# Patient Record
Sex: FEMALE | Race: WHITE | NOT HISPANIC OR LATINO | Employment: FULL TIME | URBAN - NONMETROPOLITAN AREA
[De-identification: names, ages, dates, MRNs, and addresses within clinical notes are randomized per-mention and may not be internally consistent; named-entity substitution may affect disease eponyms.]

---

## 2017-01-17 ENCOUNTER — OFFICE VISIT (OUTPATIENT)
Dept: FAMILY MEDICINE CLINIC | Facility: CLINIC | Age: 27
End: 2017-01-17

## 2017-01-17 VITALS
HEIGHT: 62 IN | DIASTOLIC BLOOD PRESSURE: 70 MMHG | SYSTOLIC BLOOD PRESSURE: 110 MMHG | OXYGEN SATURATION: 98 % | BODY MASS INDEX: 31.47 KG/M2 | HEART RATE: 92 BPM | TEMPERATURE: 98.6 F | WEIGHT: 171 LBS

## 2017-01-17 DIAGNOSIS — M54.40 BILATERAL LOW BACK PAIN WITH SCIATICA, SCIATICA LATERALITY UNSPECIFIED, UNSPECIFIED CHRONICITY: ICD-10-CM

## 2017-01-17 DIAGNOSIS — G25.81 RESTLESS LEG SYNDROME: ICD-10-CM

## 2017-01-17 DIAGNOSIS — D50.8 OTHER IRON DEFICIENCY ANEMIA: ICD-10-CM

## 2017-01-17 DIAGNOSIS — L02.214 ABSCESS OF GROIN, LEFT: Primary | ICD-10-CM

## 2017-01-17 DIAGNOSIS — J30.89 SEASONAL ALLERGIC RHINITIS DUE TO OTHER ALLERGIC TRIGGER: ICD-10-CM

## 2017-01-17 PROBLEM — D50.9 IRON DEFICIENCY ANEMIA: Status: ACTIVE | Noted: 2017-01-17

## 2017-01-17 LAB
BASOPHILS # BLD AUTO: 0.04 10*3/MM3 (ref 0–0.3)
BASOPHILS NFR BLD AUTO: 0.4 % (ref 0–2)
EOSINOPHIL # BLD AUTO: 0.5 10*3/MM3 (ref 0–0.7)
EOSINOPHIL NFR BLD AUTO: 5 % (ref 0–5)
ERYTHROCYTE [DISTWIDTH] IN BLOOD BY AUTOMATED COUNT: 13.7 % (ref 11.5–14.5)
HCT VFR BLD AUTO: 42.8 % (ref 37–47)
HGB BLD-MCNC: 13.8 G/DL (ref 12–16)
IMM GRANULOCYTES # BLD: 0.02 10*3/MM3 (ref 0–0.03)
IMM GRANULOCYTES NFR BLD: 0.2 % (ref 0–0.5)
IRON SATN MFR SERPL: 12 % (ref 15–50)
IRON SERPL-MCNC: 45 MCG/DL (ref 49–151)
LYMPHOCYTES # BLD AUTO: 2.86 10*3/MM3 (ref 1–3)
LYMPHOCYTES NFR BLD AUTO: 28.5 % (ref 21–51)
MCH RBC QN AUTO: 29.9 PG (ref 27–33)
MCHC RBC AUTO-ENTMCNC: 32.2 G/DL (ref 33–37)
MCV RBC AUTO: 92.6 FL (ref 80–94)
MONOCYTES # BLD AUTO: 0.75 10*3/MM3 (ref 0.1–0.9)
MONOCYTES NFR BLD AUTO: 7.5 % (ref 0–10)
NEUTROPHILS # BLD AUTO: 5.86 10*3/MM3 (ref 1.4–6.5)
NEUTROPHILS NFR BLD AUTO: 58.4 % (ref 30–70)
PLATELET # BLD AUTO: 367 10*3/MM3 (ref 130–400)
RBC # BLD AUTO: 4.62 10*6/MM3 (ref 4.2–5.4)
TIBC SERPL-MCNC: 368 MCG/DL (ref 241–421)
UIBC SERPL-MCNC: 323 MCG/DL (ref 112–346)
WBC # BLD AUTO: 10.03 10*3/MM3 (ref 4.5–12.5)

## 2017-01-17 PROCEDURE — 36415 COLL VENOUS BLD VENIPUNCTURE: CPT | Performed by: NURSE PRACTITIONER

## 2017-01-17 PROCEDURE — 99214 OFFICE O/P EST MOD 30 MIN: CPT | Performed by: NURSE PRACTITIONER

## 2017-01-17 RX ORDER — GABAPENTIN 300 MG/1
300 CAPSULE ORAL NIGHTLY
Qty: 30 CAPSULE | Refills: 5 | Status: SHIPPED | OUTPATIENT
Start: 2017-01-17 | End: 2017-07-17

## 2017-01-17 RX ORDER — CYCLOBENZAPRINE HCL 5 MG
5 TABLET ORAL 3 TIMES DAILY PRN
Qty: 90 TABLET | Refills: 5 | Status: SHIPPED | OUTPATIENT
Start: 2017-01-17 | End: 2017-07-17 | Stop reason: SDUPTHER

## 2017-01-17 RX ORDER — CETIRIZINE HYDROCHLORIDE 10 MG/1
10 TABLET ORAL DAILY
Qty: 30 TABLET | Refills: 5 | Status: SHIPPED | OUTPATIENT
Start: 2017-01-17 | End: 2017-07-17

## 2017-01-17 NOTE — PROGRESS NOTES
"Subjective   Elisabeth Hall is a 27 y.o. female.     History of Present Illness     Pt is here today for a scheduled routine follow up.   She is complaining of several continued health care problems. Mrs. Hall is concerned that she does not have health insurance and is unable to afford consults.     Anemia  History of iron def anemia. Was seen by hematology in the past. Not currently taking iron. Is very tired.     Low back pain/restless leg   Neurontin is helpful with the low back pain that radiates into her lower extremities. Elisabeth reports that she is unable to take during the day as it makes her very tired. She does take 300 mg at bedtime which helps with pain as well as sleep. She reports that she had an episode a few weeks ago where she moved and felt as well as heard her back \"pop\" loudly. She has been in increased pain with a tingle feeling going down her lower extremities following that episode.       Groin abscess   Pt reports that the abscess in her right groin did not improve with treatment of bactrim and Bactroban . She continues to have tenderness and drainage. At times has new areas arise on her body. Culture of drainage was negative.     Allergic rhinitis   Stable with current medication.     Smoker  Continues to smoke . Does not wish to have cessation education.     Visit Vitals   • /70 (BP Location: Right arm, Patient Position: Sitting, Cuff Size: Adult)   • Pulse 92   • Temp 98.6 °F (37 °C) (Oral)   • Ht 62\" (157.5 cm)   • Wt 171 lb (77.6 kg)   • LMP 01/01/2017   • SpO2 98%   • BMI 31.28 kg/m2       The following portions of the patient's history were reviewed and updated as appropriate: allergies, current medications, past family history, past medical history, past social history, past surgical history and problem list.    Review of Systems   Constitutional: Positive for fatigue. Negative for activity change, appetite change, chills and fever.   HENT: Negative for ear pain, facial " swelling, hearing loss, sinus pressure, sore throat, trouble swallowing and voice change.    Eyes: Negative for pain, discharge and visual disturbance.   Respiratory: Positive for cough. Negative for apnea, chest tightness, shortness of breath and wheezing.    Cardiovascular: Negative for chest pain, palpitations and leg swelling.   Gastrointestinal: Negative for abdominal pain, blood in stool, constipation, diarrhea, nausea and vomiting.   Genitourinary: Negative for dysuria.   Musculoskeletal: Positive for arthralgias, back pain and myalgias. Negative for neck stiffness.   Skin: Positive for wound. Negative for color change.   Neurological: Negative for headaches.   Psychiatric/Behavioral: Negative for confusion and suicidal ideas. The patient is not nervous/anxious.    All other systems reviewed and are negative.      Objective   Physical Exam   Constitutional: She is oriented to person, place, and time. She appears well-developed and well-nourished. No distress.   HENT:   Head: Normocephalic.   Right Ear: External ear normal.   Left Ear: External ear normal.   Nose: Nose normal.   Mouth/Throat: Oropharynx is clear and moist. No oropharyngeal exudate.   Eyes: Pupils are equal, round, and reactive to light.   Neck: Normal range of motion. Neck supple. No tracheal deviation present. No thyromegaly present.   Cardiovascular: Normal rate, regular rhythm and normal heart sounds.  Exam reveals no gallop and no friction rub.    No murmur heard.  Pulmonary/Chest: Effort normal and breath sounds normal. No respiratory distress. She has no wheezes. She has no rales. She exhibits no tenderness.   Abdominal: Soft. Bowel sounds are normal. She exhibits no distension and no mass. There is no tenderness. There is no rebound and no guarding.   Musculoskeletal: Normal range of motion.        Lumbar back: She exhibits tenderness, pain and spasm.   Lymphadenopathy:     She has no cervical adenopathy.   Neurological: She is alert  and oriented to person, place, and time. She has normal reflexes.   CN 2-12 grossly intact    Skin: Skin is warm and dry. Lesion and rash noted. Rash is nodular. She is not diaphoretic. No erythema.        Psychiatric: She has a normal mood and affect. Her speech is normal and behavior is normal. Judgment and thought content normal. She is not actively hallucinating. Cognition and memory are normal. She is attentive.   Nursing note and vitals reviewed.      Assessment/Plan       Diagnoses and all orders for this visit:    Abscess of groin, left  -     Ambulatory Referral to General Surgery  -     CBC & Differential  -     Iron Profile    Bilateral low back pain with sciatica, sciatica laterality unspecified, unspecified chronicity  -     gabapentin (NEURONTIN) 300 MG capsule; Take 1 capsule by mouth Every Night.  -     XR Spine Lumbar 2 or 3 View; Future  -     cyclobenzaprine (FLEXERIL) 5 MG tablet; Take 1 tablet by mouth 3 (Three) Times a Day As Needed for muscle spasms.  -     CBC & Differential  -     Iron Profile    Other iron deficiency anemia  -     Cancel: CBC & Differential  -     Cancel: Iron Profile  -     CBC & Differential  -     Iron Profile    Restless leg syndrome  -     gabapentin (NEURONTIN) 300 MG capsule; Take 1 capsule by mouth Every Night.  -     XR Spine Lumbar 2 or 3 View; Future  -     CBC & Differential  -     Iron Profile    Seasonal allergic rhinitis due to other allergic trigger  -     cetirizine (zyrTEC) 10 MG tablet; Take 1 tablet by mouth Daily.  -     CBC & Differential  -     Iron Profile        Staff have scheduled for surgical consult with Dr. Marroquin and private pay discount. Pt aware prior to leaving today.   I have discussed diagnosis in detail today allowing time for questions and answers. Pt is aware of reasons to seek urgent or emergent medical care as well as reasons to return to the clinic for evaluation. Possible side effects, interactions and progression of symptoms  discussed as well. Pt / family states understanding.   Will not start antibiotics until lab/ surgical consult results. Hygiene precautions discussed.  Medication adjustment for neurontin.   Emotional support and active listening provided.    recommend ortho consult, pt declines at this time.  Body mechanics reviewed.   Follow up 2-4 weeks, sooner if needed.   Routine follow up 3-6 months.

## 2017-01-17 NOTE — MR AVS SNAPSHOT
Elisabeth Davisves   1/17/2017 8:40 AM   Office Visit    Dept Phone:  [phone]   Encounter #:  [id number]    Provider:  RIANA Samuel   Department:  [facility]                Your Full Care Plan              Today's Medication Changes          These changes are accurate as of: 1/17/17  9:37 AM.  If you have any questions, ask your nurse or doctor.               Medication(s)that have changed:     gabapentin 300 MG capsule   Commonly known as:  NEURONTIN   Take 1 capsule by mouth Every Night.   What changed:    - medication strength  - when to take this   Changed by:  RIANA Samuel         Stop taking medication(s)listed here:     mupirocin 2 % ointment   Commonly known as:  BACTROBAN   Stopped by:  RIANA Samuel           sulfamethoxazole-trimethoprim 800-160 MG per tablet   Commonly known as:  BACTRIM DS   Stopped by:  RIANA Samuel                Where to Get Your Medications      These medications were sent to [address] [phone] [place] [id number] [address] [address]     Phone:  [phone]     cetirizine 10 MG tablet    cyclobenzaprine 5 MG tablet    gabapentin 300 MG capsule                  Your Updated Medication List          This list is accurate as of: 1/17/17  9:37 AM.  Always use your most recent med list.                cetirizine 10 MG tablet   Commonly known as:  zyrTEC   Take 1 tablet by mouth Daily.       cyclobenzaprine 5 MG tablet   Commonly known as:  FLEXERIL   Take 1 tablet by mouth 3 (Three) Times a Day As Needed for muscle spasms.       gabapentin 300 MG capsule   Commonly known as:  NEURONTIN   Take 1 capsule by mouth Every Night.       lisinopril 10 MG tablet   Commonly known as:  PRINIVIL,ZESTRIL   Take 1 tablet by mouth Daily.       omeprazole 40 MG capsule   Commonly known as:   "priLOSEC   Take 1 capsule by mouth Daily.               We Performed the Following     Ambulatory Referral to General Surgery     CBC & Differential     Iron Profile       You Were Diagnosed With        Codes Comments    Abscess of groin, left    -  Primary ICD-10-CM: L02.214  ICD-9-CM: 682.2     Bilateral low back pain with sciatica, sciatica laterality unspecified, unspecified chronicity     ICD-10-CM: M54.40  ICD-9-CM: 724.3     Other iron deficiency anemia     ICD-10-CM: D50.8     Restless leg syndrome     ICD-10-CM: G25.81  ICD-9-CM: 333.94     Seasonal allergic rhinitis due to other allergic trigger     ICD-10-CM: J30.89       Instructions     None    Patient Instructions History      Upcoming Appointments     Visit Type Date Time Department    OFFICE VISIT 1/17/2017  8:40 AM [facility]    OFFICE VISIT 7/17/2017 11:00 AM [facility]      MyChart Signup     Our records indicate that you have declined [facility] [employer] signup. If you would like to sign up for [employer], please email [email or web address] or call [phone] to obtain an activation code.             Other Info from Your Visit           Your Appointments     Jul 17, 2017 11:00 AM EDT   Office Visit with RIANA Samuel   [facility] (--)    [address] [place] [id number] [phone]           Please arrive 10 minutes early, bring a complete list of all medications and bring any previous records or diagnostic testing results.              Allergies     No Known Allergies      Reason for Visit     Hypertension skin lession    Restless Legs Syndrome     Back Pain           Vital Signs     Blood Pressure Pulse Temperature Height Weight Last Menstrual Period    110/70 (BP Location: Right arm, Patient Position: Sitting, Cuff Size: Adult) 92 98.6 °F (37 °C) (Oral) 62\" (157.5 cm) 171 lb (77.6 kg) 01/01/2017    Oxygen Saturation Body Mass Index Smoking Status          "    98% 31.28 kg/m2 Current Every Day Smoker         Problems and Diagnoses Noted     Abscess of groin, left    Iron deficiency anemia    Low back pain    Restless leg syndrome        Seasonal allergic rhinitis due to other allergic trigger

## 2017-01-30 ENCOUNTER — OFFICE VISIT (OUTPATIENT)
Dept: FAMILY MEDICINE CLINIC | Facility: CLINIC | Age: 27
End: 2017-01-30

## 2017-01-30 VITALS
BODY MASS INDEX: 30.18 KG/M2 | HEART RATE: 126 BPM | TEMPERATURE: 99.3 F | WEIGHT: 164 LBS | HEIGHT: 62 IN | DIASTOLIC BLOOD PRESSURE: 80 MMHG | OXYGEN SATURATION: 97 % | SYSTOLIC BLOOD PRESSURE: 120 MMHG

## 2017-01-30 DIAGNOSIS — L03.314 CELLULITIS OF GROIN: Primary | ICD-10-CM

## 2017-01-30 PROCEDURE — 99213 OFFICE O/P EST LOW 20 MIN: CPT | Performed by: PHYSICIAN ASSISTANT

## 2017-01-30 RX ORDER — SULFAMETHOXAZOLE AND TRIMETHOPRIM 800; 160 MG/1; MG/1
1 TABLET ORAL 2 TIMES DAILY
Qty: 20 TABLET | Refills: 0 | Status: SHIPPED | OUTPATIENT
Start: 2017-01-30 | End: 2017-07-17

## 2017-01-30 NOTE — PROGRESS NOTES
Subjective   Elisabeth Hall is a 27 y.o. female.     History of Present Illness     Skin issue-  She's here today with complaints of right groin swelling with a not noted on the labia majora.  She reports that she had previously had abscesses on the left groin for which she had seen Dr. Marroquin.  She states that he put her on clindamycin but she developed weakness and nausea as well as her right groin swelling so she went back to see him 5 days later.  She reports Dr. Marroquin had discontinued her clindamycin at that time and she sees him back this Thursday.  She reports associated low-grade fever.    The following portions of the patient's history were reviewed and updated as appropriate: allergies, current medications, past family history, past medical history, past social history, past surgical history and problem list.    Review of Systems   Constitutional: Positive for fever (low grade). Negative for activity change and appetite change.   HENT: Negative for ear pain, sinus pressure and sore throat.    Eyes: Negative for pain and visual disturbance.   Respiratory: Negative for cough and chest tightness.    Cardiovascular: Negative for chest pain and palpitations.   Gastrointestinal: Negative for abdominal pain, constipation, diarrhea, nausea and vomiting.   Endocrine: Negative for polydipsia and polyuria.   Genitourinary: Negative for dysuria and frequency.   Musculoskeletal: Negative for back pain and myalgias.   Skin: Negative for color change and rash.        Right groin swelling and tenderness   Allergic/Immunologic: Negative for food allergies and immunocompromised state.   Neurological: Negative for dizziness, syncope and headaches.   Hematological: Negative for adenopathy. Does not bruise/bleed easily.   Psychiatric/Behavioral: Negative for hallucinations and suicidal ideas. The patient is not nervous/anxious.        Objective   Physical Exam   Constitutional: She is oriented to person, place, and time. She  appears well-developed and well-nourished.   HENT:   Head: Normocephalic and atraumatic.   Nose: Nose normal.   Mouth/Throat: Oropharynx is clear and moist.   Eyes: Conjunctivae and EOM are normal. Pupils are equal, round, and reactive to light.   Neck: Normal range of motion. Neck supple. No tracheal deviation present. No thyromegaly present.   Cardiovascular: Normal rate, regular rhythm, normal heart sounds and intact distal pulses.    No murmur heard.  Pulmonary/Chest: Effort normal and breath sounds normal. No respiratory distress. She has no wheezes.   Abdominal: Soft. Bowel sounds are normal. There is tenderness. There is no guarding.   Musculoskeletal: Normal range of motion. She exhibits no edema or tenderness.   Lymphadenopathy:     She has no cervical adenopathy.   Neurological: She is alert and oriented to person, place, and time.   Skin: Skin is warm and dry. No rash noted. There is erythema.   Tenderness noted with subcutaneous hardening of the abscess lesion with clear fluid filled vesicle on surface of the labia majora near mons pubis.  Tenderness and swelling noted along right groin lymphatic system.   Psychiatric: She has a normal mood and affect. Her behavior is normal.   Nursing note and vitals reviewed.      Assessment/Plan   Diagnoses and all orders for this visit:    Cellulitis of groin  -     sulfamethoxazole-trimethoprim (BACTRIM DS) 800-160 MG per tablet; Take 1 tablet by mouth 2 (Two) Times a Day.     she was advised to keep her appointment with Dr. Marroquin on Thursday.  She was advised that she should not have surgery on her left groin until the cellulitis of the right groin has resolved.

## 2017-01-30 NOTE — MR AVS SNAPSHOT
Elisabeth Hall   1/30/2017 10:00 AM   Office Visit    Dept Phone:  [phone]   Encounter #:  [id number]    Provider:  DEAN Carroll   Department:  [facility]                Your Full Care Plan              Today's Medication Changes          These changes are accurate as of: 1/30/17 10:37 AM.  If you have any questions, ask your nurse or doctor.               New Medication(s)Ordered:     sulfamethoxazole-trimethoprim 800-160 MG per tablet   Commonly known as:  BACTRIM DS   Take 1 tablet by mouth 2 (Two) Times a Day.   Started by:  DEAN Carroll            Where to Get Your Medications      These medications were sent to [address] [phone] [facility] [phone]   [address] [facility] [address]     Phone:  [phone]     sulfamethoxazole-trimethoprim 800-160 MG per tablet                  Your Updated Medication List          This list is accurate as of: 1/30/17 10:37 AM.  Always use your most recent med list.                cetirizine 10 MG tablet   Commonly known as:  zyrTEC   Take 1 tablet by mouth Daily.       cyclobenzaprine 5 MG tablet   Commonly known as:  FLEXERIL   Take 1 tablet by mouth 3 (Three) Times a Day As Needed for muscle spasms.       gabapentin 300 MG capsule   Commonly known as:  NEURONTIN   Take 1 capsule by mouth Every Night.       lisinopril 10 MG tablet   Commonly known as:  PRINIVIL,ZESTRIL   Take 1 tablet by mouth Daily.       omeprazole 40 MG capsule   Commonly known as:  priLOSEC   Take 1 capsule by mouth Daily.       sulfamethoxazole-trimethoprim 800-160 MG per tablet   Commonly known as:  BACTRIM DS   Take 1 tablet by mouth 2 (Two) Times a Day.               You Were Diagnosed With        Codes Comments    Cellulitis of groin    -  Primary ICD-10-CM: L03.314  ICD-9-CM: 682.2       Instructions     None    Patient Instructions History       "  Upcoming Appointments     Visit Type Date Time Department    OFFICE VISIT 1/30/2017 10:00 AM [facility]    OFFICE VISIT 7/17/2017 11:00 AM [facility]      MyChart Signup     Our records indicate that you have declined [facility]. If you would like to sign up for [employer], please email [email or web address] or call [phone] to obtain an activation code.             Other Info from Your Visit           Your Appointments     Jul 17, 2017 11:00 AM EDT   Office Visit with RIANA Samuel   [facility] (--)    [address] [place] [id number] [phone]           Please arrive 10 minutes early, bring a complete list of all medications and bring any previous records or diagnostic testing results.              Allergies     No Known Allergies      Vital Signs     Blood Pressure Pulse Temperature Height Weight Last Menstrual Period    120/80 (BP Location: Right arm, Patient Position: Sitting, Cuff Size: Adult) 126 99.3 °F (37.4 °C) (Oral) 62\" (157.5 cm) 164 lb (74.4 kg) 01/27/2017    Oxygen Saturation Body Mass Index Smoking Status             97% 30 kg/m2 Current Every Day Smoker         Problems and Diagnoses Noted     Cellulitis of groin    -  Primary        "

## 2017-07-17 ENCOUNTER — OFFICE VISIT (OUTPATIENT)
Dept: FAMILY MEDICINE CLINIC | Facility: CLINIC | Age: 27
End: 2017-07-17

## 2017-07-17 VITALS
DIASTOLIC BLOOD PRESSURE: 75 MMHG | HEART RATE: 110 BPM | BODY MASS INDEX: 29.44 KG/M2 | WEIGHT: 160 LBS | SYSTOLIC BLOOD PRESSURE: 115 MMHG | HEIGHT: 62 IN | OXYGEN SATURATION: 97 % | TEMPERATURE: 99.2 F

## 2017-07-17 DIAGNOSIS — I10 ESSENTIAL HYPERTENSION: Primary | ICD-10-CM

## 2017-07-17 DIAGNOSIS — M62.838 MUSCLE SPASM: ICD-10-CM

## 2017-07-17 DIAGNOSIS — M54.40 BILATERAL LOW BACK PAIN WITH SCIATICA, SCIATICA LATERALITY UNSPECIFIED, UNSPECIFIED CHRONICITY: ICD-10-CM

## 2017-07-17 DIAGNOSIS — K21.00 ESOPHAGITIS, REFLUX: ICD-10-CM

## 2017-07-17 DIAGNOSIS — D50.9 IRON DEFICIENCY ANEMIA, UNSPECIFIED IRON DEFICIENCY ANEMIA TYPE: ICD-10-CM

## 2017-07-17 DIAGNOSIS — J06.9 ACUTE URI: ICD-10-CM

## 2017-07-17 PROCEDURE — 99214 OFFICE O/P EST MOD 30 MIN: CPT | Performed by: NURSE PRACTITIONER

## 2017-07-17 RX ORDER — CYCLOBENZAPRINE HCL 5 MG
5 TABLET ORAL NIGHTLY PRN
Qty: 90 TABLET | Refills: 1 | Status: SHIPPED | OUTPATIENT
Start: 2017-07-17 | End: 2017-12-05 | Stop reason: SDUPTHER

## 2017-07-17 RX ORDER — AMOXICILLIN 500 MG/1
500 CAPSULE ORAL 3 TIMES DAILY
Qty: 30 CAPSULE | Refills: 0 | Status: SHIPPED | OUTPATIENT
Start: 2017-07-17 | End: 2017-12-05

## 2017-07-17 RX ORDER — OMEPRAZOLE 40 MG/1
40 CAPSULE, DELAYED RELEASE ORAL DAILY
Qty: 30 CAPSULE | Refills: 5 | Status: SHIPPED | OUTPATIENT
Start: 2017-07-17 | End: 2017-12-05 | Stop reason: SDUPTHER

## 2017-07-17 NOTE — PROGRESS NOTES
Subjective   Elisabeth Hall is a 27 y.o. female.     Chief Complaint   Patient presents with   • Back Pain       History of Present Illness     Low back and neck pain - patient reports that she continues to have muscle spasm in her neck and low back.  She is not working presently and this has greatly helped to reduce exacerbation of pain.  She continues to have spasms for which she takes Flexeril.  Patient has a recently tapered herself off Neurontin.  She states that the Neurontin was very helpful with her symptoms however she has no insurance and can no longer afford to take Neurontin.  She has been seen by orthopedic consult in the past.    History of abscess in the left groin-patient was seen and treated by Dr. Marroquin for this problem.  Surgical intervention had been planned however patient was unable to have surgery to do to lack of insurance and cost.  She reports this abscess is resolved.  Was treated with clindamycin which she tolerated well other than some mild GI irritation.    Hypertension - patient does check her blood pressure randomly.  She reports readings within acceptable ranges.    GERD  - patient reports that her symptoms are controlled with current dose of Prilosec.  She is trying to avoid spicy and acidic foods.  Avoiding eating or drinking for 2 hours prior to reclining.     Anemia  - patient has been treated by an released by hematology.      Acute ear pain and postnasal drainage.  Symptoms began 2 or 3 days ago.  Low-grade fever for the past 24 hours.        The following portions of the patient's history were reviewed and updated as appropriate: allergies, current medications, past family history, past medical history, past social history, past surgical history and problem list.    Review of Systems   Constitutional: Negative for activity change, appetite change, chills, fatigue and fever.   HENT: Positive for ear pain and sinus pressure. Negative for facial swelling, hearing loss, sore  "throat, trouble swallowing and voice change.    Eyes: Negative for pain, discharge and visual disturbance.   Respiratory: Negative for apnea, cough, chest tightness, shortness of breath and wheezing.    Cardiovascular: Negative for chest pain, palpitations and leg swelling.   Gastrointestinal: Negative for abdominal pain, blood in stool, constipation, diarrhea, nausea and vomiting.   Genitourinary: Negative for dysuria.   Musculoskeletal: Positive for arthralgias, back pain, myalgias and neck pain. Negative for neck stiffness.   Skin: Negative for color change.   Neurological: Negative for headaches.   Psychiatric/Behavioral: Negative for confusion and suicidal ideas. The patient is not nervous/anxious.    All other systems reviewed and are negative.      Objective     /75 (BP Location: Left arm, Patient Position: Sitting)  Pulse 110  Temp 99.2 °F (37.3 °C) (Tympanic)   Ht 62\" (157.5 cm)  Wt 160 lb (72.6 kg)  LMP 07/09/2017  SpO2 97%  BMI 29.26 kg/m2      Physical Exam   Constitutional: She is oriented to person, place, and time. She appears well-developed and well-nourished. She has a sickly appearance.   Appears acutely ill.    HENT:   Head: Normocephalic.   Right Ear: Hearing normal. No lacerations. No drainage or swelling. No foreign bodies. No mastoid tenderness. A middle ear effusion is present.   Left Ear: Hearing normal. No lacerations. No drainage or swelling. No foreign bodies. No mastoid tenderness. A middle ear effusion is present.   Nose: Mucosal edema and rhinorrhea present. No nose lacerations, sinus tenderness or nasal deformity. No epistaxis.  No foreign bodies.   Mouth/Throat: Uvula is midline. Oropharyngeal exudate and posterior oropharyngeal erythema present. No tonsillar abscesses.   TM's are cloudy bilateral   Eyes: EOM are normal. Pupils are equal, round, and reactive to light.   Neck: Normal range of motion. Neck supple. No thyromegaly present.   Cardiovascular: Normal rate, " regular rhythm, normal heart sounds and intact distal pulses.    Pulmonary/Chest: Effort normal and breath sounds normal. No respiratory distress.   Cough noted    Abdominal: Soft. Bowel sounds are normal. She exhibits no distension. There is no tenderness. There is no guarding.   Musculoskeletal:        Lumbar back: She exhibits tenderness.   Lymphadenopathy:     She has cervical adenopathy.        Right cervical: Superficial cervical adenopathy present. No deep cervical adenopathy present.       Left cervical: Superficial cervical adenopathy present. No deep cervical adenopathy present.   Neurological: She is alert and oriented to person, place, and time. She has normal reflexes.   Skin: Skin is warm and dry. No rash noted.   Psychiatric: She has a normal mood and affect. Her speech is normal and behavior is normal. Judgment and thought content normal. Cognition and memory are normal.       Assessment/Plan     Problem List Items Addressed This Visit        Cardiovascular and Mediastinum    Essential hypertension - Primary       Digestive    Esophagitis, reflux    Relevant Medications    omeprazole (priLOSEC) 40 MG capsule       Nervous and Auditory    Lumbago    Relevant Medications    cyclobenzaprine (FLEXERIL) 5 MG tablet       Musculoskeletal and Integument    Muscle spasm       Hematopoietic and Hemostatic    Iron deficiency anemia      Other Visit Diagnoses     Acute URI        Relevant Medications    amoxicillin (AMOXIL) 500 MG capsule       hypertension is stable.  GERD is stable.  No current symptoms of anemia.  Discussed reasons patient should seek emergent or urgent care regarding the abscess in her groin area.  Patient understands the need to seek care if this area returns.    Fluids, rest, symptomatic treatment advised. Discussed symptoms to report as well as reasons to seek urgent or emergent medical attention. Understanding stated.   Working on smoke cessation. Started patches.   I have discussed  diagnosis in detail today allowing time for questions and answers. Pt is aware of reasons to seek urgent or emergent medical care as well as reasons to return to the clinic for evaluation. Possible side effects, interactions and progression of symptoms discussed as well. Pt / family states understanding.   RTC 2-5 days if not improved, sooner if condition worsens/changes. Symptomatic care advised as well as reasons for urgent or emergent care. Pt / family state understanding.    Routine follow-up is recommended every 6 months.  We will obtain screening labs once yearly at patient request instead of every 6 months due to lack of insurance and cost.          This document has been electronically signed by:  RIANA Tellez, NP-C

## 2017-12-05 ENCOUNTER — OFFICE VISIT (OUTPATIENT)
Dept: FAMILY MEDICINE CLINIC | Facility: CLINIC | Age: 27
End: 2017-12-05

## 2017-12-05 VITALS
TEMPERATURE: 99.4 F | SYSTOLIC BLOOD PRESSURE: 122 MMHG | WEIGHT: 161 LBS | DIASTOLIC BLOOD PRESSURE: 80 MMHG | HEART RATE: 104 BPM | HEIGHT: 62 IN | BODY MASS INDEX: 29.63 KG/M2 | OXYGEN SATURATION: 97 %

## 2017-12-05 DIAGNOSIS — M54.40 BILATERAL LOW BACK PAIN WITH SCIATICA, SCIATICA LATERALITY UNSPECIFIED, UNSPECIFIED CHRONICITY: ICD-10-CM

## 2017-12-05 DIAGNOSIS — K21.00 ESOPHAGITIS, REFLUX: ICD-10-CM

## 2017-12-05 DIAGNOSIS — M25.511 ACUTE PAIN OF RIGHT SHOULDER: Primary | ICD-10-CM

## 2017-12-05 PROCEDURE — 96372 THER/PROPH/DIAG INJ SC/IM: CPT | Performed by: NURSE PRACTITIONER

## 2017-12-05 PROCEDURE — 99214 OFFICE O/P EST MOD 30 MIN: CPT | Performed by: NURSE PRACTITIONER

## 2017-12-05 RX ORDER — CYCLOBENZAPRINE HCL 5 MG
5 TABLET ORAL 3 TIMES DAILY PRN
Qty: 90 TABLET | Refills: 5 | Status: SHIPPED | OUTPATIENT
Start: 2017-12-05 | End: 2018-02-05 | Stop reason: SDUPTHER

## 2017-12-05 RX ORDER — HYDROCODONE BITARTRATE AND ACETAMINOPHEN 5; 325 MG/1; MG/1
1-2 TABLET ORAL EVERY 6 HOURS PRN
Qty: 20 TABLET | Refills: 0 | Status: SHIPPED | OUTPATIENT
Start: 2017-12-05 | End: 2017-12-21

## 2017-12-05 RX ORDER — OMEPRAZOLE 40 MG/1
40 CAPSULE, DELAYED RELEASE ORAL DAILY
Qty: 30 CAPSULE | Refills: 5 | Status: SHIPPED | OUTPATIENT
Start: 2017-12-05 | End: 2018-02-05 | Stop reason: SDUPTHER

## 2017-12-05 RX ORDER — HYDROCODONE BITARTRATE AND ACETAMINOPHEN 5; 325 MG/1; MG/1
1-2 TABLET ORAL EVERY 6 HOURS PRN
Qty: 20 TABLET | Refills: 0 | Status: SHIPPED | OUTPATIENT
Start: 2017-12-05 | End: 2017-12-05 | Stop reason: SDUPTHER

## 2017-12-05 RX ORDER — METHYLPREDNISOLONE ACETATE 80 MG/ML
80 INJECTION, SUSPENSION INTRA-ARTICULAR; INTRALESIONAL; INTRAMUSCULAR; SOFT TISSUE ONCE
Status: COMPLETED | OUTPATIENT
Start: 2017-12-05 | End: 2017-12-05

## 2017-12-05 RX ORDER — KETOROLAC TROMETHAMINE 30 MG/ML
60 INJECTION, SOLUTION INTRAMUSCULAR; INTRAVENOUS ONCE
Status: COMPLETED | OUTPATIENT
Start: 2017-12-05 | End: 2017-12-05

## 2017-12-05 RX ADMIN — KETOROLAC TROMETHAMINE 60 MG: 30 INJECTION, SOLUTION INTRAMUSCULAR; INTRAVENOUS at 15:32

## 2017-12-05 RX ADMIN — METHYLPREDNISOLONE ACETATE 80 MG: 80 INJECTION, SUSPENSION INTRA-ARTICULAR; INTRALESIONAL; INTRAMUSCULAR; SOFT TISSUE at 15:33

## 2017-12-05 NOTE — PROGRESS NOTES
"Subjective   Elisabeth aHll is a 27 y.o. female.     Chief Complaint   Patient presents with   • right shoulder pain       History of Present Illness     Fell out of the cab of a truck two weeks ago while trying to get out. The vehicle was in stopped, tried to catch herself with her right arm. Her neck started hurting this week on the right side. She did not go to the ER due to lack of insurance.     Describes her pain as throbbing in her neck and right shoulder. Right shoulder pain is increased with elevation higher than 90 degree angle. She rates this pain as 8 on psr of 0-10, worse with movement of joint use. She can not afford xray's due to lack of insurance.       GERD-she currently takes Prilosec 40 mg daily which helps decrease her symptoms.    Chronic muscle spasms in her low back and neck -patient takes Flexeril 5 mg 3 times a day as needed.  She works full-time at a rate on furniture store.       The following portions of the patient's history were reviewed and updated as appropriate: allergies, current medications, past family history, past medical history, past social history, past surgical history and problem list.    Review of Systems   Musculoskeletal: Positive for arthralgias, back pain and neck pain. Negative for neck stiffness.   All other systems reviewed and are negative.      Objective     /80 (BP Location: Right arm, Patient Position: Sitting, Cuff Size: Adult)  Pulse 104  Temp 99.4 °F (37.4 °C) (Tympanic)   Ht 157.5 cm (62.01\")  Wt 73 kg (161 lb)  LMP 11/16/2017  SpO2 97%  BMI 29.44 kg/m2  Office Visit on 01/17/2017   Component Date Value Ref Range Status   • WBC 01/17/2017 10.03  4.50 - 12.50 10*3/mm3 Final   • RBC 01/17/2017 4.62  4.20 - 5.40 10*6/mm3 Final   • Hemoglobin 01/17/2017 13.8  12.0 - 16.0 g/dL Final   • Hematocrit 01/17/2017 42.8  37.0 - 47.0 % Final   • MCV 01/17/2017 92.6  80.0 - 94.0 fL Final   • MCH 01/17/2017 29.9  27.0 - 33.0 pg Final   • MCHC 01/17/2017 32.2* " 33.0 - 37.0 g/dL Final   • RDW 01/17/2017 13.7  11.5 - 14.5 % Final   • Platelets 01/17/2017 367  130 - 400 10*3/mm3 Final   • Neutrophil Rel % 01/17/2017 58.4  30.0 - 70.0 % Final   • Lymphocyte Rel % 01/17/2017 28.5  21.0 - 51.0 % Final   • Monocyte Rel % 01/17/2017 7.5  0.0 - 10.0 % Final   • Eosinophil Rel % 01/17/2017 5.0  0.0 - 5.0 % Final   • Basophil Rel % 01/17/2017 0.4  0.0 - 2.0 % Final   • Neutrophils Absolute 01/17/2017 5.86  1.40 - 6.50 10*3/mm3 Final   • Lymphocytes Absolute 01/17/2017 2.86  1.00 - 3.00 10*3/mm3 Final   • Monocytes Absolute 01/17/2017 0.75  0.10 - 0.90 10*3/mm3 Final   • Eosinophils Absolute 01/17/2017 0.50  0.00 - 0.70 10*3/mm3 Final   • Basophils Absolute 01/17/2017 0.04  0.00 - 0.30 10*3/mm3 Final   • Immature Granulocyte Rel % 01/17/2017 0.2  0.0 - 0.5 % Final   • Immature Grans Absolute 01/17/2017 0.02  0.00 - 0.03 10*3/mm3 Final   • TIBC 01/17/2017 368  241 - 421 mcg/dL Final   • UIBC 01/17/2017 323  112 - 346 mcg/dL Final   • Iron 01/17/2017 45* 49 - 151 mcg/dL Final   • Iron Saturation 01/17/2017 12* 15 - 50 % Final       Physical Exam   Constitutional: She is oriented to person, place, and time. Vital signs are normal. She appears well-developed and well-nourished. No distress.   Very pleasant adult female, both verbal and nonverbal signs of pain.   HENT:   Head: Atraumatic.   Mouth/Throat: No oropharyngeal exudate.   Eyes: EOM are normal. Pupils are equal, round, and reactive to light.   Neck: Neck supple.   Cardiovascular: Normal rate, regular rhythm and normal heart sounds.    No murmur heard.  Pulmonary/Chest: Effort normal and breath sounds normal. No respiratory distress. She exhibits no tenderness.   Abdominal: Soft. Normal appearance. She exhibits no distension. There is no tenderness.   Musculoskeletal:        Right shoulder: She exhibits decreased range of motion, tenderness and crepitus.        Cervical back: She exhibits tenderness, pain and spasm (right side ).    Lymphadenopathy:     She has no cervical adenopathy.   Neurological: She is alert and oriented to person, place, and time.   Skin: Skin is warm and dry.   Psychiatric: She has a normal mood and affect. Her behavior is normal. Judgment and thought content normal.       Assessment/Plan     Problem List Items Addressed This Visit        Digestive    Esophagitis, reflux    Relevant Medications    omeprazole (priLOSEC) 40 MG capsule       Nervous and Auditory    Lumbago    Relevant Medications    cyclobenzaprine (FLEXERIL) 5 MG tablet      Other Visit Diagnoses     Acute pain of right shoulder    -  Primary    Relevant Medications    HYDROcodone-acetaminophen (NORCO) 5-325 MG per tablet    ketorolac (TORADOL) injection 60 mg (Start on 12/5/2017  3:30 PM)    methylPREDNISolone acetate (DEPO-medrol) injection 80 mg (Start on 12/5/2017  3:30 PM)    Other Relevant Orders    Urine Drug Screen - Urine, Clean Catch        Refill Flexeril 5 mg 1 by mouth 3 times a day #90 with 5 refills.  Apply ice  for 10-15 minutes at least 3 times a day.  Outpatient order has been provided for x-ray of the right shoulder and collarbone.  Patient states if she is not improved in a few days she will have x-rays performed at [facility] due to decreased cost.  I have recommended physical therapy evaluation which patient declines at this time due to lack of insurance and cost.  Continue ibuprofen 600-800 mg every 6-8 hours as needed.  Reviewed possible interactions and side effects.  We'll provide patient with Norco 5-3 25 one to 2 tabs by mouth every 6 hours when necessary #20 with no refills.   Body mechanics have been reviewed.  I have discussed diagnosis in detail today allowing time for questions and answers. Pt is aware of reasons to seek urgent or emergent medical care as well as reasons to return to the clinic for evaluation. Possible side effects, interactions and progression of symptoms discussed as well. Pt / family states  understanding.   Emotional support and active listening provided.   RTC 2-5 days if not improved, sooner if condition worsens/changes. Symptomatic care advised as well as reasons for urgent or emergent care. Pt / family state understanding.                This document has been electronically signed by:  RIANA Tellez, NP-C

## 2017-12-19 DIAGNOSIS — Z79.899 HIGH RISK MEDICATION USE: Primary | ICD-10-CM

## 2017-12-19 LAB
ALBUMIN SERPL-MCNC: 4.3 G/DL (ref 3.5–5)
ALBUMIN/GLOB SERPL: 1.5 G/DL (ref 1.5–2.5)
ALP SERPL-CCNC: 100 U/L (ref 35–104)
ALT SERPL W P-5'-P-CCNC: 16 U/L (ref 10–36)
ANION GAP SERPL CALCULATED.3IONS-SCNC: 4.3 MMOL/L (ref 3.6–11.2)
AST SERPL-CCNC: 12 U/L (ref 10–30)
BASOPHILS # BLD AUTO: 0.05 10*3/MM3 (ref 0–0.3)
BASOPHILS NFR BLD AUTO: 0.6 % (ref 0–2)
BILIRUB SERPL-MCNC: 0.3 MG/DL (ref 0.2–1.8)
BUN BLD-MCNC: 16 MG/DL (ref 7–21)
BUN/CREAT SERPL: 20.3 (ref 7–25)
CALCIUM SPEC-SCNC: 9.4 MG/DL (ref 7.7–10)
CHLORIDE SERPL-SCNC: 109 MMOL/L (ref 99–112)
CHOLEST SERPL-MCNC: 183 MG/DL (ref 0–200)
CO2 SERPL-SCNC: 27.7 MMOL/L (ref 24.3–31.9)
CREAT BLD-MCNC: 0.79 MG/DL (ref 0.43–1.29)
DEPRECATED RDW RBC AUTO: 45.8 FL (ref 37–54)
EOSINOPHIL # BLD AUTO: 0.56 10*3/MM3 (ref 0–0.7)
EOSINOPHIL NFR BLD AUTO: 6.7 % (ref 0–5)
ERYTHROCYTE [DISTWIDTH] IN BLOOD BY AUTOMATED COUNT: 13.8 % (ref 11.5–14.5)
GFR SERPL CREATININE-BSD FRML MDRD: 87 ML/MIN/1.73
GLOBULIN UR ELPH-MCNC: 2.9 GM/DL
GLUCOSE BLD-MCNC: 112 MG/DL (ref 70–110)
HCT VFR BLD AUTO: 40.8 % (ref 37–47)
HDLC SERPL-MCNC: 40 MG/DL (ref 60–100)
HGB BLD-MCNC: 13.4 G/DL (ref 12–16)
IMM GRANULOCYTES # BLD: 0.01 10*3/MM3 (ref 0–0.03)
IMM GRANULOCYTES NFR BLD: 0.1 % (ref 0–0.5)
IRON 24H UR-MRATE: 58 MCG/DL (ref 49–151)
IRON SATN MFR SERPL: 16 % (ref 15–50)
LDLC SERPL CALC-MCNC: 123 MG/DL (ref 0–100)
LDLC/HDLC SERPL: 3.07 {RATIO}
LYMPHOCYTES # BLD AUTO: 2.7 10*3/MM3 (ref 1–3)
LYMPHOCYTES NFR BLD AUTO: 32.4 % (ref 21–51)
MCH RBC QN AUTO: 29.7 PG (ref 27–33)
MCHC RBC AUTO-ENTMCNC: 32.8 G/DL (ref 33–37)
MCV RBC AUTO: 90.5 FL (ref 80–94)
MONOCYTES # BLD AUTO: 0.52 10*3/MM3 (ref 0.1–0.9)
MONOCYTES NFR BLD AUTO: 6.2 % (ref 0–10)
NEUTROPHILS # BLD AUTO: 4.5 10*3/MM3 (ref 1.4–6.5)
NEUTROPHILS NFR BLD AUTO: 54 % (ref 30–70)
OSMOLALITY SERPL CALC.SUM OF ELEC: 283.2 MOSM/KG (ref 273–305)
PLATELET # BLD AUTO: 368 10*3/MM3 (ref 130–400)
PMV BLD AUTO: 9.9 FL (ref 6–10)
POTASSIUM BLD-SCNC: 3.7 MMOL/L (ref 3.5–5.3)
PROT SERPL-MCNC: 7.2 G/DL (ref 6–8)
RBC # BLD AUTO: 4.51 10*6/MM3 (ref 4.2–5.4)
SODIUM BLD-SCNC: 141 MMOL/L (ref 135–153)
TIBC SERPL-MCNC: 354 MCG/DL (ref 241–421)
TRIGL SERPL-MCNC: 102 MG/DL (ref 0–150)
VLDLC SERPL-MCNC: 20.4 MG/DL
WBC NRBC COR # BLD: 8.34 10*3/MM3 (ref 4.5–12.5)

## 2017-12-19 PROCEDURE — 80053 COMPREHEN METABOLIC PANEL: CPT | Performed by: NURSE PRACTITIONER

## 2017-12-19 PROCEDURE — 83550 IRON BINDING TEST: CPT | Performed by: NURSE PRACTITIONER

## 2017-12-19 PROCEDURE — 80061 LIPID PANEL: CPT | Performed by: NURSE PRACTITIONER

## 2017-12-19 PROCEDURE — 36415 COLL VENOUS BLD VENIPUNCTURE: CPT | Performed by: NURSE PRACTITIONER

## 2017-12-19 PROCEDURE — 83540 ASSAY OF IRON: CPT | Performed by: NURSE PRACTITIONER

## 2017-12-19 PROCEDURE — 85025 COMPLETE CBC W/AUTO DIFF WBC: CPT | Performed by: NURSE PRACTITIONER

## 2017-12-19 NOTE — PROGRESS NOTES
Called and informed pt about LDL results. Advised pt to stop smoking, try to lose weight, try to exercise 5 days a week. Try to eat a healthy diet. Pt is to start Simvastatin. Advised on benefits of taking statins over the side effects they will experience. PKF.

## 2017-12-21 ENCOUNTER — OFFICE VISIT (OUTPATIENT)
Dept: FAMILY MEDICINE CLINIC | Facility: CLINIC | Age: 27
End: 2017-12-21

## 2017-12-21 VITALS
SYSTOLIC BLOOD PRESSURE: 110 MMHG | HEIGHT: 62 IN | OXYGEN SATURATION: 99 % | TEMPERATURE: 98.5 F | WEIGHT: 159 LBS | BODY MASS INDEX: 29.26 KG/M2 | HEART RATE: 109 BPM | DIASTOLIC BLOOD PRESSURE: 70 MMHG

## 2017-12-21 DIAGNOSIS — E78.5 ELEVATED FASTING LIPID PROFILE: ICD-10-CM

## 2017-12-21 DIAGNOSIS — L02.214 ABSCESS OF GROIN, LEFT: Primary | ICD-10-CM

## 2017-12-21 DIAGNOSIS — D50.8 OTHER IRON DEFICIENCY ANEMIA: ICD-10-CM

## 2017-12-21 DIAGNOSIS — I10 ESSENTIAL HYPERTENSION: ICD-10-CM

## 2017-12-21 DIAGNOSIS — M54.40 LOW BACK PAIN WITH SCIATICA, SCIATICA LATERALITY UNSPECIFIED, UNSPECIFIED BACK PAIN LATERALITY, UNSPECIFIED CHRONICITY: ICD-10-CM

## 2017-12-21 DIAGNOSIS — R53.82 CHRONIC FATIGUE: ICD-10-CM

## 2017-12-21 DIAGNOSIS — R73.01 ELEVATED FASTING GLUCOSE: ICD-10-CM

## 2017-12-21 PROBLEM — R53.83 FATIGUE: Status: ACTIVE | Noted: 2017-12-21

## 2017-12-21 PROCEDURE — 99214 OFFICE O/P EST MOD 30 MIN: CPT | Performed by: NURSE PRACTITIONER

## 2017-12-21 RX ORDER — SULFAMETHOXAZOLE AND TRIMETHOPRIM 800; 160 MG/1; MG/1
1 TABLET ORAL 2 TIMES DAILY
Qty: 20 TABLET | Refills: 0 | Status: SHIPPED | OUTPATIENT
Start: 2017-12-21 | End: 2017-12-31

## 2017-12-21 NOTE — PROGRESS NOTES
Subjective   Elisabeth Hall is a 27 y.o. female.     Chief Complaint   Patient presents with   • results         History of Present Illness     She is here today to review recent labs.    Fatigue-she reports that she continues to have some fatigue and lack of energy.  He is not currently taking a multivitamin.    GERD-stable with prostate.    Recurrent boils-patient works that she has another boil in her left groin.  This was on status last.  In the past these areas responded to antibiotic therapy.  Has been cultured in the past.    Medical history is significant for strong family history of diabetes.  Her father is insulin-dependent and her mother is type II.  Both her siblings are also diabetic.    Low back pain-patient reports some improvement in her low back pain.  She is currently taking Flexeril and over-the-counter anti-inflammatory/Tylenol as needed.  She is trying to follow body mechanics and avoid lifting.      The following portions of the patient's history were reviewed and updated as appropriate: allergies, current medications, past family history, past medical history, past social history, past surgical history and problem list.    Review of Systems   Constitutional: Positive for fatigue.   Eyes: Positive for visual disturbance (wears glasses, ).   Respiratory: Negative for cough, chest tightness, shortness of breath and wheezing.    Cardiovascular: Negative for chest pain, palpitations and leg swelling.   Gastrointestinal: Negative for abdominal pain, constipation, diarrhea and nausea.   Endocrine: Negative.    Genitourinary: Negative for dysuria and frequency.   Musculoskeletal: Positive for back pain. Negative for gait problem, neck pain and neck stiffness.   Allergic/Immunologic: Negative.    Neurological: Negative.    Hematological: Negative.    Psychiatric/Behavioral: Negative for confusion, dysphoric mood, sleep disturbance and suicidal ideas. The patient is not nervous/anxious.    All other  "systems reviewed and are negative.      Objective     /70 (BP Location: Right arm, Patient Position: Sitting, Cuff Size: Adult)  Pulse 109  Temp 98.5 °F (36.9 °C) (Tympanic)   Ht 157.5 cm (62.01\")  Wt 72.1 kg (159 lb)  LMP 12/14/2017  SpO2 99%  BMI 29.07 kg/m2  Orders Only on 12/19/2017   Component Date Value Ref Range Status   • Glucose 12/19/2017 112* 70 - 110 mg/dL Final   • BUN 12/19/2017 16  7 - 21 mg/dL Final   • Creatinine 12/19/2017 0.79  0.43 - 1.29 mg/dL Final   • Sodium 12/19/2017 141  135 - 153 mmol/L Final   • Potassium 12/19/2017 3.7  3.5 - 5.3 mmol/L Final   • Chloride 12/19/2017 109  99 - 112 mmol/L Final   • CO2 12/19/2017 27.7  24.3 - 31.9 mmol/L Final   • Calcium 12/19/2017 9.4  7.7 - 10.0 mg/dL Final   • Total Protein 12/19/2017 7.2  6.0 - 8.0 g/dL Final   • Albumin 12/19/2017 4.30  3.50 - 5.00 g/dL Final   • ALT (SGPT) 12/19/2017 16  10 - 36 U/L Final   • AST (SGOT) 12/19/2017 12  10 - 30 U/L Final   • Alkaline Phosphatase 12/19/2017 100  35 - 104 U/L Final   • Total Bilirubin 12/19/2017 0.3  0.2 - 1.8 mg/dL Final   • eGFR Non African Amer 12/19/2017 87  >60 mL/min/1.73 Final   • Globulin 12/19/2017 2.9  gm/dL Final   • A/G Ratio 12/19/2017 1.5  1.5 - 2.5 g/dL Final   • BUN/Creatinine Ratio 12/19/2017 20.3  7.0 - 25.0 Final   • Anion Gap 12/19/2017 4.3  3.6 - 11.2 mmol/L Final   • Total Cholesterol 12/19/2017 183  0 - 200 mg/dL Final   • Triglycerides 12/19/2017 102  0 - 150 mg/dL Final   • HDL Cholesterol 12/19/2017 40* 60 - 100 mg/dL Final   • LDL Cholesterol  12/19/2017 123* 0 - 100 mg/dL Final   • VLDL Cholesterol 12/19/2017 20.4  mg/dL Final   • LDL/HDL Ratio 12/19/2017 3.07   Final   • Iron 12/19/2017 58  49 - 151 mcg/dL Final   • TIBC 12/19/2017 354  241 - 421 mcg/dL Final   • Iron Saturation 12/19/2017 16  15 - 50 % Final   • WBC 12/19/2017 8.34  4.50 - 12.50 10*3/mm3 Final   • RBC 12/19/2017 4.51  4.20 - 5.40 10*6/mm3 Final   • Hemoglobin 12/19/2017 13.4  12.0 - 16.0 g/dL " Final   • Hematocrit 12/19/2017 40.8  37.0 - 47.0 % Final   • MCV 12/19/2017 90.5  80.0 - 94.0 fL Final   • MCH 12/19/2017 29.7  27.0 - 33.0 pg Final   • MCHC 12/19/2017 32.8* 33.0 - 37.0 g/dL Final   • RDW 12/19/2017 13.8  11.5 - 14.5 % Final   • RDW-SD 12/19/2017 45.8  37.0 - 54.0 fl Final   • MPV 12/19/2017 9.9  6.0 - 10.0 fL Final   • Platelets 12/19/2017 368  130 - 400 10*3/mm3 Final   • Neutrophil % 12/19/2017 54.0  30.0 - 70.0 % Final   • Lymphocyte % 12/19/2017 32.4  21.0 - 51.0 % Final   • Monocyte % 12/19/2017 6.2  0.0 - 10.0 % Final   • Eosinophil % 12/19/2017 6.7* 0.0 - 5.0 % Final   • Basophil % 12/19/2017 0.6  0.0 - 2.0 % Final   • Immature Grans % 12/19/2017 0.1  0.0 - 0.5 % Final   • Neutrophils, Absolute 12/19/2017 4.50  1.40 - 6.50 10*3/mm3 Final   • Lymphocytes, Absolute 12/19/2017 2.70  1.00 - 3.00 10*3/mm3 Final   • Monocytes, Absolute 12/19/2017 0.52  0.10 - 0.90 10*3/mm3 Final   • Eosinophils, Absolute 12/19/2017 0.56  0.00 - 0.70 10*3/mm3 Final   • Basophils, Absolute 12/19/2017 0.05  0.00 - 0.30 10*3/mm3 Final   • Immature Grans, Absolute 12/19/2017 0.01  0.00 - 0.03 10*3/mm3 Final   • Osmolality Calc 12/19/2017 283.2  273.0 - 305.0 mOsm/kg Final       Physical Exam   Constitutional: She is oriented to person, place, and time. She appears well-developed and well-nourished.   HENT:   Head: Normocephalic.   Right Ear: External ear normal.   Left Ear: External ear normal.   Nose: Nose normal.   Mouth/Throat: Oropharynx is clear and moist.   Eyes: Pupils are equal, round, and reactive to light.   Neck: Normal range of motion. Neck supple. No tracheal deviation present. No thyromegaly present.   Cardiovascular: Normal rate, regular rhythm and normal heart sounds.  Exam reveals no gallop and no friction rub.    No murmur heard.  Pulmonary/Chest: Effort normal and breath sounds normal. No respiratory distress. She has no wheezes. She has no rales. She exhibits no tenderness.   Abdominal: Soft.  Bowel sounds are normal. She exhibits no distension and no mass. There is no tenderness. There is no rebound and no guarding.   Musculoskeletal: Normal range of motion.        Lumbar back: She exhibits tenderness and spasm.   Neurological: She is alert and oriented to person, place, and time. She has normal reflexes.   CN 2-12 grossly intact    Skin: Skin is warm and dry. Lesion noted. No rash noted. No erythema.        Psychiatric: She has a normal mood and affect. Her behavior is normal. Judgment and thought content normal.       Assessment/Plan     Problem List Items Addressed This Visit        Cardiovascular and Mediastinum    Essential hypertension    Elevated fasting lipid profile       Endocrine    Elevated fasting glucose       Nervous and Auditory    Lumbago       Hematopoietic and Hemostatic    Iron deficiency anemia       Other    Abscess of groin, left - Primary    Fatigue            Current Outpatient Prescriptions:   •  cyclobenzaprine (FLEXERIL) 5 MG tablet, Take 1 tablet (5 mg total) by mouth 3 (Three) Times a Day As Needed for Muscle Spasms, Disp: 90 tablet, Rfl: 5  •  omeprazole (priLOSEC) 40 MG capsule, Take 1 capsule (40 mg total) by mouth Daily, Disp: 30 capsule, Rfl: 5  •  mupirocin (BACTROBAN) 2 % ointment, Apply  topically 3 (Three) Times a Day., Disp: 1 each, Rfl: 3  •  sulfamethoxazole-trimethoprim (BACTRIM DS,SEPTRA DS) 800-160 MG per tablet, Take 1 tablet by mouth 2 (Two) Times a Day for 10 days., Disp: 20 tablet, Rfl: 0    Emotional support and active listening provided.   I have discussed diagnosis in detail today allowing time for questions and answers. Pt is aware of reasons to seek urgent or emergent medical care as well as reasons to return to the clinic for evaluation. Possible side effects, interactions and progression of symptoms discussed as well. Pt / family states understanding.   Pt has been educated/instructed on diabetic care and protocols. Sick day rules reviewed. Continue  to monitor blood sugar and report abnormal readings as discussed today. Pt / family state understanding.     Patient does not have insurance and cannot afford her glucometer.  I have provided her with a free sample of glucometer with 10 days testing strips and lancets.  We discussed need to randomly check her glucose level fasting and 2 hours post meal.  She will keep a log and return this log to this office in 2 weeks.  I have instructed patient on normal versus abnormal readings and readings which she would need to report.  Start Bactrim DS twice daily.  Start Bactroban ointment 3 times daily to the left groin.  Warm soaks and infection control discussed.  Recent fasting labs have been reviewed and discussed.  Patient will decrease her carb intake and increase her fresh fruits and vegetables as well as exercise.  We will repeat a fasting lipid panel in 3 months and further discuss medication for lipid lowering agent if lifestyle changes are not effective.  Recommend follow up in 4-6 weeks, sooner if needed.      Errors in dictation may reflect use of voice recognition software and not all errors in transcription may have been detected prior to signing.            This document has been electronically signed by:  RIANA Tellez, NP-C

## 2018-01-02 ENCOUNTER — OFFICE VISIT (OUTPATIENT)
Dept: FAMILY MEDICINE CLINIC | Facility: CLINIC | Age: 28
End: 2018-01-02

## 2018-01-02 VITALS
DIASTOLIC BLOOD PRESSURE: 80 MMHG | HEIGHT: 62 IN | HEART RATE: 110 BPM | SYSTOLIC BLOOD PRESSURE: 120 MMHG | TEMPERATURE: 99.9 F | BODY MASS INDEX: 29.08 KG/M2 | WEIGHT: 158 LBS | OXYGEN SATURATION: 97 %

## 2018-01-02 DIAGNOSIS — H60.01 ABSCESS, EARLOBE, RIGHT: Primary | ICD-10-CM

## 2018-01-02 PROCEDURE — 87070 CULTURE OTHR SPECIMN AEROBIC: CPT | Performed by: PHYSICIAN ASSISTANT

## 2018-01-02 PROCEDURE — 10060 I&D ABSCESS SIMPLE/SINGLE: CPT | Performed by: PHYSICIAN ASSISTANT

## 2018-01-02 PROCEDURE — 99213 OFFICE O/P EST LOW 20 MIN: CPT | Performed by: PHYSICIAN ASSISTANT

## 2018-01-02 PROCEDURE — 87205 SMEAR GRAM STAIN: CPT | Performed by: PHYSICIAN ASSISTANT

## 2018-01-02 RX ORDER — CEPHALEXIN 500 MG/1
500 CAPSULE ORAL 3 TIMES DAILY
Qty: 30 CAPSULE | Refills: 0 | Status: SHIPPED | OUTPATIENT
Start: 2018-01-02 | End: 2018-01-12

## 2018-01-02 NOTE — PROGRESS NOTES
Subjective   Elisabeth Hall is a 27 y.o. female.     History of Present Illness     Painful earlobe-  She complains of a knot in her right earlobe that is swollen and red with associated low-grade fever for the past week.  She states that she started taking Bactrim twice daily 6 days ago with minimal relief.    The following portions of the patient's history were reviewed and updated as appropriate: allergies, current medications, past family history, past medical history, past social history, past surgical history and problem list.    Review of Systems   Constitutional: Positive for fever. Negative for activity change and appetite change.   HENT: Negative for ear pain, sinus pressure and sore throat.    Eyes: Negative for pain and visual disturbance.   Respiratory: Negative for cough and chest tightness.    Cardiovascular: Negative for chest pain and palpitations.   Gastrointestinal: Negative for abdominal pain, constipation, diarrhea, nausea and vomiting.   Endocrine: Negative for polydipsia and polyuria.   Genitourinary: Negative for dysuria and frequency.   Musculoskeletal: Negative for back pain and myalgias.   Skin: Negative for color change and rash.        Painful swollen right earlobe   Allergic/Immunologic: Negative for food allergies and immunocompromised state.   Neurological: Negative for dizziness, syncope and headaches.   Hematological: Negative for adenopathy. Does not bruise/bleed easily.   Psychiatric/Behavioral: Negative for hallucinations and suicidal ideas. The patient is not nervous/anxious.        Objective   Physical Exam   Constitutional: She is oriented to person, place, and time. She appears well-developed and well-nourished. No distress.   HENT:   Head: Normocephalic and atraumatic.   Right Ear: Tympanic membrane, external ear and ear canal normal.   Left Ear: Tympanic membrane, external ear and ear canal normal.   Mouth/Throat: Oropharynx is clear and moist.   Neck: Normal range of  motion. Neck supple. No thyromegaly present.   Cardiovascular: Normal rate and regular rhythm.    No murmur heard.  Pulmonary/Chest: Effort normal and breath sounds normal. She has no wheezes. She has no rales.   Neurological: She is alert and oriented to person, place, and time.   Skin: Skin is warm and dry. There is erythema.   Erythematous tender swollen abscess noted right earlobe   Psychiatric: She has a normal mood and affect. Her behavior is normal.       Assessment/Plan   Diagnoses and all orders for this visit:    Abscess, earlobe, right  -     cephalexin (KEFLEX) 500 MG capsule; Take 1 capsule by mouth 3 (Three) Times a Day for 10 days.  -     Culture, Routine - Swab, Ear, Right    Procedure: Incision and drainage abscess right earlobe  The procedure risks and benefits were explained to patient she gave verbal consent have the procedure performed  Indication: Abscess right earlobe  Provider: Chata Forman PA-C  Description: The right earlobe was prepped and draped in sterile fashion.  An incision was made into the posterior right earlobe draining the abscess successfully.  Pressure was held and a sterile dressing was placed.  No consultations  Estimated blood loss: Minimal  Patient tolerated the procedure well    She was advised to discontinue Bactrim and start Keflex.  Culture was taken and sent for pathology today.

## 2018-01-04 LAB
BACTERIA SPEC AEROBE CULT: NORMAL
GRAM STN SPEC: NORMAL
GRAM STN SPEC: NORMAL

## 2018-02-05 ENCOUNTER — OFFICE VISIT (OUTPATIENT)
Dept: FAMILY MEDICINE CLINIC | Facility: CLINIC | Age: 28
End: 2018-02-05

## 2018-02-05 VITALS
TEMPERATURE: 98.9 F | OXYGEN SATURATION: 99 % | HEART RATE: 105 BPM | SYSTOLIC BLOOD PRESSURE: 160 MMHG | DIASTOLIC BLOOD PRESSURE: 100 MMHG | WEIGHT: 161 LBS | HEIGHT: 62 IN | BODY MASS INDEX: 29.63 KG/M2

## 2018-02-05 DIAGNOSIS — N92.0 MENORRHAGIA WITH REGULAR CYCLE: Primary | ICD-10-CM

## 2018-02-05 DIAGNOSIS — K21.00 ESOPHAGITIS, REFLUX: ICD-10-CM

## 2018-02-05 DIAGNOSIS — L72.3 SEBACEOUS CYST OF EAR: ICD-10-CM

## 2018-02-05 DIAGNOSIS — M62.838 MUSCLE SPASM: ICD-10-CM

## 2018-02-05 DIAGNOSIS — M54.40 BILATERAL LOW BACK PAIN WITH SCIATICA, SCIATICA LATERALITY UNSPECIFIED, UNSPECIFIED CHRONICITY: ICD-10-CM

## 2018-02-05 PROCEDURE — 99214 OFFICE O/P EST MOD 30 MIN: CPT | Performed by: NURSE PRACTITIONER

## 2018-02-05 RX ORDER — RANITIDINE 150 MG/1
150 TABLET ORAL 2 TIMES DAILY
Qty: 60 TABLET | Refills: 5 | Status: SHIPPED | OUTPATIENT
Start: 2018-02-05 | End: 2019-04-29 | Stop reason: SDUPTHER

## 2018-02-05 RX ORDER — ATENOLOL 25 MG/1
25 TABLET ORAL DAILY
Qty: 30 TABLET | Refills: 3 | Status: SHIPPED | OUTPATIENT
Start: 2018-02-05 | End: 2018-03-23

## 2018-02-05 RX ORDER — CLONIDINE HYDROCHLORIDE 0.1 MG/1
0.1 TABLET ORAL ONCE
Qty: 1 TABLET | Refills: 0 | Status: SHIPPED | OUTPATIENT
Start: 2018-02-05 | End: 2018-02-05

## 2018-02-05 RX ORDER — CYCLOBENZAPRINE HCL 5 MG
5 TABLET ORAL 3 TIMES DAILY PRN
Qty: 90 TABLET | Refills: 5 | Status: SHIPPED | OUTPATIENT
Start: 2018-02-05 | End: 2018-08-16 | Stop reason: SDUPTHER

## 2018-02-05 RX ORDER — OMEPRAZOLE 40 MG/1
40 CAPSULE, DELAYED RELEASE ORAL DAILY
Qty: 30 CAPSULE | Refills: 5 | Status: SHIPPED | OUTPATIENT
Start: 2018-02-05 | End: 2018-05-08

## 2018-02-05 NOTE — PROGRESS NOTES
Subjective   Elisabeth Hall is a 28 y.o. female.     Chief Complaint   Patient presents with   • Hypertension       History of Present Illness       Hypertension- failed norvasc due to swelling. Lisinopril due to fatigue and hypotension.   Hctz due to dehydration.   Has bp machine at home.       Gerd -Patient continues to have some reflux when she lies down at night.  She does drink a glass of water just before reclining.  She is not compliant with her diet.  Continues to smoke.      Heavy periods- passing large blood clots. Has tubal. First few days stays in restroom changing pads. Wears overnight pads during the day and night. Low back pain with periods. Usually regular.   No infertility history. No DVT history.   Pt dose smoke and is under age 35.   Last pap has been around 7 years ago when she had her tubal.   Does not have insurance. No history of abnormal pap smears.     Constipation - stable    Low back pain and muscle spasm - recurrent low back pain with overuse.  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  She recently moved.  She does take Flexeril as needed and some over-the-counter pain reduction medicines.    Cystic nodule - patient reports she had a cystic nodule removed from her right earlobe.  She feels this small nodule may be returning.    Patient does not have insurance and wishes to avoid expensive testing at this time.      The following portions of the patient's history were reviewed and updated as appropriate: allergies, current medications, past family history, past medical history, past social history, past surgical history and problem list.    Review of Systems   Constitutional: Positive for fatigue.   Eyes: Negative.    Respiratory: Negative.    Cardiovascular: Negative.    Gastrointestinal: Positive for nausea.   Endocrine: Negative.    Genitourinary: Flank pain:  right side.   Musculoskeletal: Positive for back pain, neck pain and neck stiffness.   Skin: Negative.   "  Allergic/Immunologic: Negative.    Neurological: Positive for headaches.   Hematological: Negative.        Objective     /100  Pulse 105  Temp 98.9 °F (37.2 °C) (Tympanic)   Ht 157.5 cm (62.01\")  Wt 73 kg (161 lb)  LMP 01/10/2018  SpO2 99%  BMI 29.44 kg/m2  Office Visit on 01/02/2018   Component Date Value Ref Range Status   • Culture 01/02/2018 Scant growth (1+) Normal Skin Rocio   Final   • Gram Stain Result 01/02/2018 Moderate (3+) WBCs seen   Final   • Gram Stain Result 01/02/2018 No organisms seen   Final     Blood pressure evaluation following staff administration of clonidine 0.1 mg-blood pressure reading of 130/90 and heart rate of 88.  This was taken 20 minute following clonidine.      Physical Exam   Constitutional: She is oriented to person, place, and time. She appears well-developed and well-nourished. No distress.   Very pleasant adult female sitting on exam room table.  Hypertensive at initial evaluation with blood pressure 160/100 and heart rate 105.   HENT:   Head: Normocephalic.   Right Ear: Hearing, tympanic membrane, external ear and ear canal normal.   Left Ear: Hearing, tympanic membrane, external ear and ear canal normal.   Ears:    Nose: Nose normal.   Mouth/Throat: Oropharynx is clear and moist. No oropharyngeal exudate.   Eyes: Pupils are equal, round, and reactive to light.   Neck: Normal range of motion. Neck supple. No tracheal deviation present. No thyromegaly present.   Cardiovascular: Normal rate, regular rhythm, normal heart sounds and normal pulses.  Exam reveals no gallop and no friction rub.    No murmur heard.  RRR   Pulmonary/Chest: Effort normal and breath sounds normal. No respiratory distress. She has no wheezes. She has no rales. She exhibits no tenderness.   Abdominal: Soft. Normal appearance and bowel sounds are normal. She exhibits no distension and no mass. There is no tenderness. There is no rebound and no guarding.   Musculoskeletal: Normal range of " motion.        Lumbar back: She exhibits tenderness.   Lymphadenopathy:     She has no cervical adenopathy.   Neurological: She is alert and oriented to person, place, and time. She has normal reflexes.   CN 2-12 grossly intact    Skin: Skin is warm and dry. No rash noted. She is not diaphoretic. No erythema.   Psychiatric: She has a normal mood and affect. Her speech is normal and behavior is normal. Judgment and thought content normal. Cognition and memory are normal.       Assessment/Plan     Problem List Items Addressed This Visit        Digestive    Esophagitis, reflux    Relevant Medications    raNITIdine (ZANTAC) 150 MG tablet    omeprazole (priLOSEC) 40 MG capsule       Nervous and Auditory    Lumbago    Relevant Medications    cyclobenzaprine (FLEXERIL) 5 MG tablet    Sebaceous cyst of ear    Current Assessment & Plan     Patient will monitor for changes and report.  We'll consider removal if changes are noted.            Musculoskeletal and Integument    Muscle spasm       Genitourinary    Menorrhagia with regular cycle - Primary    Relevant Orders    Ambulatory Referral to Gynecology (Completed)        Patient does not wish to have a lesion removed at this time as it is very tiny.  She will monitor for increase in cyst on her right earlobe and return for removal/testing if the area continues to correct or has any change.  Smoke cessation education has been provided.  Patient has been instructed on the risk of smoking on her disease process such as GERD and hypertension.  Elevate head of bed on blocks. No food or fluids prior to reclining for at least 2 hours.  Add zantac.   Refer to [facility] department for GYN/ pap.  Add atenolol. Hold if heart rate is less than 60 or bp less than 100/60.   Monitor bp daily and as needed. Report readings over 15/90 or less than 100/60.  I have discussed diagnosis in detail today allowing time for questions and answers. Pt is aware of reasons to seek urgent or  emergent medical care as well as reasons to return to the clinic for evaluation. Possible side effects, interactions and progression of symptoms discussed as well. Pt / family states understanding.   Emotional support and active listening provided.   I recommend routine labs of CBC, CMP, lipid profile, TSH at least every 6 months.  I recommend Pap smear at least every 2 years.  Recommend mammograms starting at age 40.  I recommend chest x-ray yearly due to smoking.  Patient declines testing at this time due to lack of health insurance at this time.  Follow up in 6-8 weeks, sooner if needed.       Errors in dictation may reflect use of voice recognition software and not all errors in transcription may have been detected prior to signing.            This document has been electronically signed by:  RIANA Tellez, NP-C

## 2018-03-19 ENCOUNTER — TELEPHONE (OUTPATIENT)
Dept: FAMILY MEDICINE CLINIC | Facility: CLINIC | Age: 28
End: 2018-03-19

## 2018-03-19 NOTE — TELEPHONE ENCOUNTER
----- Message from RIANA Samuel sent at 3/19/2018  2:00 PM EDT -----  Have her break it in half and if this does not improve her symptoms let us know. Also have her check her heart rate and not take if her heart  Rate is less than 60.       ----- Message -----  From: Celia Garcia MA  Sent: 3/19/2018  12:11 PM  To: RIANA Samuel    Patients  stated that socorro's atenolol is making her very drowsy, to the point she cant seem to wake up. Wants to know if you could switch it to something different?

## 2018-03-19 NOTE — TELEPHONE ENCOUNTER
Spoke to patients , he stated they have already tried to cut it in half. It still makes her really drowsy. He stated that she had took Norvasc in the past and wanted to see if tamica would prescribe that with a water pill since it makes her retain fluid. Tamica stated that she would need an appointment to discuss medications. Patients  is aware of this information.

## 2018-03-23 ENCOUNTER — OFFICE VISIT (OUTPATIENT)
Dept: FAMILY MEDICINE CLINIC | Facility: CLINIC | Age: 28
End: 2018-03-23

## 2018-03-23 VITALS
WEIGHT: 163 LBS | DIASTOLIC BLOOD PRESSURE: 100 MMHG | BODY MASS INDEX: 30 KG/M2 | HEART RATE: 89 BPM | HEIGHT: 62 IN | TEMPERATURE: 98.4 F | SYSTOLIC BLOOD PRESSURE: 158 MMHG | OXYGEN SATURATION: 97 %

## 2018-03-23 DIAGNOSIS — K21.00 ESOPHAGITIS, REFLUX: ICD-10-CM

## 2018-03-23 DIAGNOSIS — F41.1 GAD (GENERALIZED ANXIETY DISORDER): ICD-10-CM

## 2018-03-23 DIAGNOSIS — K58.1 IRRITABLE BOWEL SYNDROME WITH CONSTIPATION: ICD-10-CM

## 2018-03-23 DIAGNOSIS — I10 ESSENTIAL HYPERTENSION: Primary | ICD-10-CM

## 2018-03-23 LAB — TSH SERPL DL<=0.05 MIU/L-ACNC: 0.63 MIU/ML (ref 0.55–4.78)

## 2018-03-23 PROCEDURE — 99214 OFFICE O/P EST MOD 30 MIN: CPT | Performed by: PHYSICIAN ASSISTANT

## 2018-03-23 PROCEDURE — 84443 ASSAY THYROID STIM HORMONE: CPT | Performed by: PHYSICIAN ASSISTANT

## 2018-03-23 RX ORDER — SPIRONOLACTONE 25 MG/1
25 TABLET ORAL DAILY
Qty: 30 TABLET | Refills: 5 | Status: SHIPPED | OUTPATIENT
Start: 2018-03-23 | End: 2018-07-09

## 2018-03-23 RX ORDER — TRIFLUOPERAZINE HYDROCHLORIDE 1 MG/1
1 TABLET, FILM COATED ORAL EVERY 12 HOURS SCHEDULED
Qty: 60 TABLET | Refills: 5 | Status: SHIPPED | OUTPATIENT
Start: 2018-03-23 | End: 2018-05-08

## 2018-03-23 NOTE — PROGRESS NOTES
Subjective   Elisabeth Hall is a 28 y.o. female.     Chief complaint-hypertension    History of Present Illness     Hypertension-  Uncontrolled.  Blood pressure remains elevated 150-160\90s.  Patient states that she only uses atenolol 12.5 mg tablet when necessary due to bradycardia and extreme fatigue.  She has had side effects with multiple blood pressure medications and trouble controlling her blood pressure in the past.  She states that blood pressure was the best controlled without medication when she was not working.  She did start a new job in September that requires long hours and is stressful.      Her reactions to previous blood pressure medications include:  Atenolol-extreme fatigue  Lisinopril-hypotension on 10 mg  Norvasc-fluid retention in knees  Hydrochlorothiazide-shortness of breath when taken with Norvasc for fluid retention    Anxiety-  She does report nervousness and anxiety with relation to her stressful job.  This may be directly related to her elevated blood pressure levels.  Not at goal    Gastroesophageal reflux disease-reflux esophagitis is controlled with omeprazole and Zantac    Irritable bowel syndrome with associated constipation-stable without medication    The following portions of the patient's history were reviewed and updated as appropriate: allergies, current medications, past family history, past medical history, past social history, past surgical history and problem list.    Review of Systems   Constitutional: Positive for fatigue. Negative for activity change, appetite change and fever.   HENT: Negative for ear pain, sinus pressure and sore throat.    Eyes: Negative for pain and visual disturbance.   Respiratory: Negative for cough and chest tightness.    Cardiovascular: Negative for chest pain and palpitations.   Gastrointestinal: Negative for abdominal pain, constipation, diarrhea, nausea and vomiting.   Endocrine: Negative for polydipsia and polyuria.   Genitourinary:  "Negative for dysuria and frequency.   Musculoskeletal: Negative for back pain and myalgias.   Skin: Negative for color change and rash.   Allergic/Immunologic: Negative for food allergies and immunocompromised state.   Neurological: Negative for dizziness, syncope and headaches.   Hematological: Negative for adenopathy. Does not bruise/bleed easily.   Psychiatric/Behavioral: Negative for hallucinations and suicidal ideas. The patient is nervous/anxious.        /100   Pulse 89   Temp 98.4 °F (36.9 °C) (Oral)   Ht 157.5 cm (62.01\")   Wt 73.9 kg (163 lb)   LMP 03/07/2018   SpO2 97%   BMI 29.81 kg/m²   Office Visit on 01/02/2018   Component Date Value Ref Range Status   • Culture 01/04/2018 Scant growth (1+) Normal Skin Rocio   Final   • Gram Stain Result 01/04/2018 Moderate (3+) WBCs seen   Final   • Gram Stain Result 01/04/2018 No organisms seen   Final       Physical Exam   Constitutional: She is oriented to person, place, and time. She appears well-developed and well-nourished.   HENT:   Head: Normocephalic and atraumatic.   Nose: Nose normal.   Mouth/Throat: Oropharynx is clear and moist.   Eyes: Conjunctivae and EOM are normal. Pupils are equal, round, and reactive to light.   Neck: Normal range of motion. Neck supple. No tracheal deviation present. No thyromegaly present.   Cardiovascular: Normal rate, regular rhythm, normal heart sounds and intact distal pulses.    No murmur heard.  Pulmonary/Chest: Effort normal and breath sounds normal. No respiratory distress. She has no wheezes.   Abdominal: Soft. Bowel sounds are normal. There is no tenderness. There is no guarding.   Musculoskeletal: Normal range of motion. She exhibits no edema or tenderness.   Lymphadenopathy:     She has no cervical adenopathy.   Neurological: She is alert and oriented to person, place, and time.   Skin: Skin is warm and dry. No rash noted.   Psychiatric: She has a normal mood and affect. Her behavior is normal.   Nursing " note and vitals reviewed.      Assessment/Plan     Diagnoses and all orders for this visit:    Essential hypertension  -     spironolactone (ALDACTONE) 25 MG tablet; Take 1 tablet by mouth Daily.  -     TSH    KALIN (generalized anxiety disorder)  -     trifluoperazine (STELAZINE) 1 MG tablet; Take 1 tablet by mouth Every 12 (Twelve) Hours.    Esophagitis, reflux    Irritable bowel syndrome with constipation                 This document has been electronically signed by:  Chata Forman PA-C

## 2018-05-08 ENCOUNTER — OFFICE VISIT (OUTPATIENT)
Dept: FAMILY MEDICINE CLINIC | Facility: CLINIC | Age: 28
End: 2018-05-08

## 2018-05-08 VITALS
OXYGEN SATURATION: 96 % | BODY MASS INDEX: 30 KG/M2 | HEIGHT: 62 IN | HEART RATE: 105 BPM | TEMPERATURE: 98.9 F | SYSTOLIC BLOOD PRESSURE: 156 MMHG | DIASTOLIC BLOOD PRESSURE: 102 MMHG | WEIGHT: 163 LBS

## 2018-05-08 DIAGNOSIS — K59.04 FUNCTIONAL CONSTIPATION: Primary | ICD-10-CM

## 2018-05-08 DIAGNOSIS — M54.41 CHRONIC MIDLINE LOW BACK PAIN WITH RIGHT-SIDED SCIATICA: ICD-10-CM

## 2018-05-08 DIAGNOSIS — I10 ESSENTIAL HYPERTENSION: ICD-10-CM

## 2018-05-08 DIAGNOSIS — G89.29 CHRONIC MIDLINE LOW BACK PAIN WITH RIGHT-SIDED SCIATICA: ICD-10-CM

## 2018-05-08 DIAGNOSIS — K21.00 ESOPHAGITIS, REFLUX: ICD-10-CM

## 2018-05-08 PROCEDURE — 99214 OFFICE O/P EST MOD 30 MIN: CPT | Performed by: NURSE PRACTITIONER

## 2018-05-08 RX ORDER — METOPROLOL SUCCINATE 25 MG/1
25 TABLET, EXTENDED RELEASE ORAL DAILY
Qty: 30 TABLET | Refills: 5 | Status: SHIPPED | OUTPATIENT
Start: 2018-05-08 | End: 2018-08-16 | Stop reason: SDUPTHER

## 2018-05-08 RX ORDER — LANSOPRAZOLE 30 MG/1
30 CAPSULE, DELAYED RELEASE ORAL DAILY
Qty: 30 CAPSULE | Refills: 5 | Status: SHIPPED | OUTPATIENT
Start: 2018-05-08 | End: 2019-04-29 | Stop reason: SDUPTHER

## 2018-05-08 NOTE — PROGRESS NOTES
"Subjective   Elisabeth Hall is a 28 y.o. female.     Chief Complaint   Patient presents with   • Follow-up       History of Present Illness     Hypertension - elevated readings on and off for 5 months. Failed multiple drugs.   Norvasc caused swelling. HCTZ caused difficulty breathing. Failed Lisinopril dropped her blood pressure.   Failed atenolol due to feeling \"foggy\", tried the 25 mg twice daily and 1/2 of 25 mg which was not successful.     Chronic low back pain -  Continues to have low  Back pain located midline with radiation into her right lower extremity. Worse with activity.     Right leg pain - no erythema or heat , leg feels like it is tingling and  Burning times.       Smoker - chronic cough.  Does not wish to attempt smoke cessation at this time.    Constipation - chronic.  Failed Amitiza, MiraLAX and over-the-counter laxatives.  Bowel movement was soft and then every 5-7 days.      The following portions of the patient's history were reviewed and updated as appropriate: allergies, current medications, past family history, past medical history, past social history, past surgical history and problem list.    Review of Systems   Constitutional: Positive for appetite change. Negative for fatigue, fever and unexpected weight change.   HENT: Negative for congestion, ear pain, nosebleeds, postnasal drip, rhinorrhea, sinus pain, sinus pressure, sore throat, trouble swallowing and voice change.    Eyes: Negative for pain and visual disturbance.   Respiratory: Positive for cough. Negative for chest tightness, shortness of breath and wheezing.    Cardiovascular: Negative for chest pain, palpitations and leg swelling.   Gastrointestinal: Positive for abdominal pain (with constipation ), constipation and nausea. Negative for blood in stool, diarrhea and vomiting.        During times of constipation   Endocrine: Negative for cold intolerance and polydipsia.   Genitourinary: Negative for difficulty urinating, " "dysuria, flank pain, hematuria and pelvic pain.   Musculoskeletal: Positive for back pain and neck pain. Negative for arthralgias, gait problem, joint swelling and myalgias.   Skin: Negative for color change, rash and wound.   Allergic/Immunologic: Negative.  Negative for environmental allergies and food allergies.   Neurological: Positive for numbness. Negative for dizziness, syncope, weakness, light-headedness and headaches.   Hematological: Negative.  Does not bruise/bleed easily.   Psychiatric/Behavioral: Negative for behavioral problems, dysphoric mood, sleep disturbance and suicidal ideas. The patient is not nervous/anxious.    All other systems reviewed and are negative.      Objective     BP (!) 156/102   Pulse 105   Temp 98.9 °F (37.2 °C) (Oral)   Ht 157.5 cm (62.01\")   Wt 73.9 kg (163 lb)   LMP 04/20/2018   SpO2 96%   BMI 29.81 kg/m²   Office Visit on 03/23/2018   Component Date Value Ref Range Status   • TSH 03/23/2018 0.631  0.550 - 4.780 mIU/mL Final       Physical Exam   Constitutional: She is oriented to person, place, and time. She appears well-developed and well-nourished.   Hypertensive at 156/102, not on any blood pressure medicine at this time.   HENT:   Head: Normocephalic.   Right Ear: External ear normal.   Left Ear: External ear normal.   Nose: Nose normal.   Mouth/Throat: Oropharynx is clear and moist.   Eyes: Pupils are equal, round, and reactive to light.   Neck: Normal range of motion. Neck supple. No tracheal deviation present. No thyromegaly present.   Cardiovascular: Normal rate, regular rhythm and normal heart sounds.  Exam reveals no gallop and no friction rub.    No murmur heard.  Pulmonary/Chest: Effort normal and breath sounds normal. No respiratory distress. She has no wheezes. She has no rales. She exhibits no tenderness.   Abdominal: Soft. Bowel sounds are normal. She exhibits no distension and no mass. There is no tenderness. There is no rebound and no guarding. "   Musculoskeletal: Normal range of motion.        Right ankle: She exhibits normal pulse.        Left ankle: She exhibits normal pulse.        Lumbar back: She exhibits tenderness. She exhibits no spasm.        Right lower leg: She exhibits no tenderness, no swelling, no edema and no deformity.        Left lower leg: She exhibits no tenderness, no swelling, no edema and no deformity.   Neurological: She is alert and oriented to person, place, and time. She has normal reflexes. She displays normal reflexes.   CN 2-12 grossly intact    Skin: Skin is warm and dry. No rash noted. No erythema.   Psychiatric: She has a normal mood and affect. Her speech is normal and behavior is normal. Judgment and thought content normal. Cognition and memory are normal.       Assessment/Plan     Problem List Items Addressed This Visit        Cardiovascular and Mediastinum    Essential hypertension    Relevant Medications    metoprolol succinate XL (TOPROL-XL) 25 MG 24 hr tablet       Digestive    Esophagitis, reflux    Relevant Medications    lansoprazole (PREVACID) 30 MG capsule    Constipation - functional - Primary       Nervous and Auditory    Chronic midline low back pain with sciatica      Other Visit Diagnoses    None.       Samples of Linzess 290 mcg daily x 2 boxes.   Proper body mechanics has been reviewed and discussed today.  Recommend patient wear compression stockings kneehighs.  Have discussed some fashionable options that is available either at local pharmacies or [email or web address].  Patient states that she will work on smoke cessation.  Keep blood pressure log at least twice daily, reporting reading greater than 150/90.  Bring log in in 2 weeks for provider to review.  Patient has no insurance and requests no follow-up visit at that time due to expense.  We will plan to see her back in approximate 6 weeks.  We have reviewed the reasons to seek emergent or urgent medical attention and normal versus abnormal blood  pressure readings.  Start metoprolol XL 25 mg daily.  Pt has been instructed today regarding low fat heart smart diet. Weight management and routine exercise has been recommended. Avoid high fat foods, starchy foods and processed foods. Increase lean meats, fresh vegetables and fresh fruits.   I advised the patient of the risks in continuing to use tobacco, and I provided this patient with smoking cessation educational materials.  I have discussed diagnosis in detail today allowing time for questions and answers. Pt is aware of reasons to seek urgent or emergent medical care as well as reasons to return to the clinic for evaluation. Possible side effects, interactions and progression of symptoms discussed as well. Pt / family states understanding.   Emotional support and active listening provided.     During this visit, I spent <3 minutes counseling the patient regarding smoking cessation.    Errors in dictation may reflect use of voice recognition software and not all errors in transcription may have been detected prior to signing.                  This document has been electronically signed by:  RIANA Tellez, NP-C

## 2018-05-14 ENCOUNTER — TELEPHONE (OUTPATIENT)
Dept: FAMILY MEDICINE CLINIC | Facility: CLINIC | Age: 28
End: 2018-05-14

## 2018-07-09 ENCOUNTER — OFFICE VISIT (OUTPATIENT)
Dept: FAMILY MEDICINE CLINIC | Facility: CLINIC | Age: 28
End: 2018-07-09

## 2018-07-09 VITALS
OXYGEN SATURATION: 98 % | TEMPERATURE: 98.8 F | BODY MASS INDEX: 30.73 KG/M2 | HEIGHT: 62 IN | DIASTOLIC BLOOD PRESSURE: 80 MMHG | SYSTOLIC BLOOD PRESSURE: 120 MMHG | WEIGHT: 167 LBS | HEART RATE: 91 BPM

## 2018-07-09 DIAGNOSIS — M54.42 ACUTE BILATERAL LOW BACK PAIN WITH BILATERAL SCIATICA: Primary | ICD-10-CM

## 2018-07-09 DIAGNOSIS — M54.41 ACUTE BILATERAL LOW BACK PAIN WITH BILATERAL SCIATICA: Primary | ICD-10-CM

## 2018-07-09 PROCEDURE — 96372 THER/PROPH/DIAG INJ SC/IM: CPT | Performed by: PHYSICIAN ASSISTANT

## 2018-07-09 PROCEDURE — 99213 OFFICE O/P EST LOW 20 MIN: CPT | Performed by: PHYSICIAN ASSISTANT

## 2018-07-09 RX ORDER — KETOROLAC TROMETHAMINE 30 MG/ML
60 INJECTION, SOLUTION INTRAMUSCULAR; INTRAVENOUS ONCE
Status: COMPLETED | OUTPATIENT
Start: 2018-07-09 | End: 2018-07-09

## 2018-07-09 RX ADMIN — KETOROLAC TROMETHAMINE 60 MG: 30 INJECTION, SOLUTION INTRAMUSCULAR; INTRAVENOUS at 13:45

## 2018-07-09 NOTE — PROGRESS NOTES
Subjective   Elisabeth Hall is a 28 y.o. female.     Chief complaint-back pain    History of Present Illness     Back pain-  She complains of bilateral lower back pain that radiates into her bilateral legs.  Pain is described as burning sensation that varies from mild to severe depending on her positioning.  She states when she lays flat pain is a mild dullness but otherwise gives her a burning sensation with associated numbness and tingling intermittently throughout her legs.  Onset one week ago for current exacerbation.  She has a history of back problems.    5/2/2015 MRI of the lumbar spine revealed degenerative changes.  Disc desiccation with small broad-based posterior disc bulge at L4-5 and broad-based posterior disc bulge causing mild central canal stenosis and minimal bilateral neural foraminal stenosis at L5-S1.    The following portions of the patient's history were reviewed and updated as appropriate: allergies, current medications, past family history, past medical history, past social history, past surgical history and problem list.    Review of Systems   Constitutional: Negative for activity change, appetite change and fever.   HENT: Negative for ear pain, sinus pressure and sore throat.    Eyes: Negative for pain and visual disturbance.   Respiratory: Negative for cough and chest tightness.    Cardiovascular: Negative for chest pain and palpitations.   Gastrointestinal: Negative for abdominal pain, constipation, diarrhea, nausea and vomiting.   Endocrine: Negative for polydipsia and polyuria.   Genitourinary: Negative for dysuria and frequency.   Musculoskeletal: Positive for back pain. Negative for myalgias.   Skin: Negative for color change and rash.   Allergic/Immunologic: Negative for food allergies and immunocompromised state.   Neurological: Positive for numbness. Negative for dizziness, syncope and headaches.   Hematological: Negative for adenopathy. Does not bruise/bleed easily.  "  Psychiatric/Behavioral: Negative for hallucinations and suicidal ideas. The patient is not nervous/anxious.        /80   Pulse 91   Temp 98.8 °F (37.1 °C) (Oral)   Ht 157.5 cm (62.01\")   Wt 75.8 kg (167 lb)   LMP 06/16/2018   SpO2 98%   BMI 30.54 kg/m²     Physical Exam   Constitutional: She appears well-developed and well-nourished.   Cardiovascular: Normal rate, regular rhythm and normal heart sounds.    No murmur heard.  Pulmonary/Chest: Effort normal and breath sounds normal. She has no wheezes.   Musculoskeletal: She exhibits tenderness.   Decreased range of lumbar flexion and extension due to pain.  Tenderness noted in bilateral lumbar musculature   Nursing note and vitals reviewed.      Assessment/Plan     Diagnoses and all orders for this visit:    Acute bilateral low back pain with bilateral sciatica  -     ketorolac (TORADOL) injection 60 mg; Inject 60 mg into the shoulder, thigh, or buttocks 1 (One) Time.      She was taught some physical therapy stretches to do at home.  Patient has prescription of prednisone at home and was advised to take prednisone 40 mg per day for 2 days then 20 mg per day for 5 days then 10 mg per day for 2 days.           This document has been electronically signed by:  Chaat Forman PA-C  "

## 2018-08-16 ENCOUNTER — OFFICE VISIT (OUTPATIENT)
Dept: FAMILY MEDICINE CLINIC | Facility: CLINIC | Age: 28
End: 2018-08-16

## 2018-08-16 VITALS
OXYGEN SATURATION: 98 % | HEIGHT: 62 IN | HEART RATE: 99 BPM | TEMPERATURE: 97.6 F | DIASTOLIC BLOOD PRESSURE: 90 MMHG | SYSTOLIC BLOOD PRESSURE: 130 MMHG | BODY MASS INDEX: 31.1 KG/M2 | WEIGHT: 169 LBS

## 2018-08-16 DIAGNOSIS — M54.41 CHRONIC MIDLINE LOW BACK PAIN WITH RIGHT-SIDED SCIATICA: ICD-10-CM

## 2018-08-16 DIAGNOSIS — J44.1 COPD WITH EXACERBATION (HCC): ICD-10-CM

## 2018-08-16 DIAGNOSIS — M79.7 FIBROMYALGIA: Primary | ICD-10-CM

## 2018-08-16 DIAGNOSIS — M54.40 BILATERAL LOW BACK PAIN WITH SCIATICA, SCIATICA LATERALITY UNSPECIFIED, UNSPECIFIED CHRONICITY: ICD-10-CM

## 2018-08-16 DIAGNOSIS — I10 ESSENTIAL HYPERTENSION: ICD-10-CM

## 2018-08-16 DIAGNOSIS — G89.29 CHRONIC MIDLINE LOW BACK PAIN WITH RIGHT-SIDED SCIATICA: ICD-10-CM

## 2018-08-16 PROCEDURE — 99214 OFFICE O/P EST MOD 30 MIN: CPT | Performed by: NURSE PRACTITIONER

## 2018-08-16 RX ORDER — METHYLPREDNISOLONE 4 MG/1
TABLET ORAL
Qty: 1 EACH | Refills: 0 | Status: SHIPPED | OUTPATIENT
Start: 2018-08-16 | End: 2018-10-05

## 2018-08-16 RX ORDER — CYCLOBENZAPRINE HCL 5 MG
5 TABLET ORAL 3 TIMES DAILY PRN
Qty: 90 TABLET | Refills: 5 | Status: SHIPPED | OUTPATIENT
Start: 2018-08-16 | End: 2019-01-21 | Stop reason: SDUPTHER

## 2018-08-16 RX ORDER — METOPROLOL SUCCINATE 25 MG/1
25 TABLET, EXTENDED RELEASE ORAL DAILY
Qty: 30 TABLET | Refills: 5 | Status: SHIPPED | OUTPATIENT
Start: 2018-08-16 | End: 2019-04-29 | Stop reason: SDUPTHER

## 2018-08-16 RX ORDER — CEPHALEXIN 500 MG/1
500 CAPSULE ORAL 3 TIMES DAILY
Qty: 30 CAPSULE | Refills: 0 | Status: SHIPPED | OUTPATIENT
Start: 2018-08-16 | End: 2018-12-07

## 2018-08-16 RX ORDER — GABAPENTIN 300 MG/1
300 CAPSULE ORAL NIGHTLY
Qty: 30 CAPSULE | Refills: 2 | Status: SHIPPED | OUTPATIENT
Start: 2018-08-16 | End: 2018-12-07

## 2018-08-16 NOTE — PROGRESS NOTES
Subjective   Elisabeth Hall is a 28 y.o. female.     Chief Complaint   Patient presents with   • needs refills   • Back Pain   • Hypertension       History of Present Illness     Hypertension-stable with metoprolol.  Does check her blood pressure randomly.    Chronic midline low back pain with sciatica-low back pain with radiation into the right and left lower extremity.  Pain is described as sharp with a tingling burning sensation worse on the right side than left.  She does have an MRI on file showing disc bulges at L4/L5 and L5/S1.  Patient is unable to afford physical therapy or neuro consult at this time due to having no insurance.  She states she cannot afford the payments.  She has been in physical therapy the past which was helpful and does do home therapy stretches and exercises.  She has pain medication remaining from previous prescriptions.  Reports that she tries to avoid taking pain medication such as Norco because it makes her sick to her stomach.  She takes ibuprofen/Tylenol which gives her very minimal relief in her back pain.  She does have some tingling and burning in her feet at times.  She takes Flexeril at least daily which does help with muscle spasms in her low back.    Fibromyalgia-patient has pain both above and below the waist.  Tender points located bilaterally.  In the past patient received Neurontin at bedtime which is helpful not only for her fibromyalgia pain but for her sciatica/lumbar pain.    COPD with exacerbation-patient has frequent exacerbation of COPD.  She continues to smoke.  She was seen in the emergency room within the past month for pleurisy per patient.  She reports that this improved with steroids.  She continues to have some chest tightness with deep inspiration.  She has had a dry cough for several years now.    Patient declined labs due to lack of insurance.    GERD-controlled with Prevacid and Zantac which she obtains over-the-counter  The following portions of the  "patient's history were reviewed and updated as appropriate: allergies, current medications, past family history, past medical history, past social history, past surgical history and problem list.    Review of Systems   Constitutional: Positive for activity change. Negative for fever.   Eyes: Negative.    Respiratory: Positive for cough, chest tightness and shortness of breath (with exertion ). Negative for choking and wheezing.    Cardiovascular: Negative for chest pain and palpitations.   Gastrointestinal: Negative for abdominal pain.   Endocrine: Negative.    Genitourinary: Negative for dysuria and pelvic pain.   Musculoskeletal: Positive for arthralgias, back pain and myalgias. Negative for neck pain and neck stiffness.   Skin: Negative.    Allergic/Immunologic: Negative.    Neurological: Positive for weakness and numbness. Negative for headaches.   Psychiatric/Behavioral: Negative for dysphoric mood, self-injury and suicidal ideas.       Objective     /90   Pulse 99   Temp 97.6 °F (36.4 °C) (Tympanic)   Ht 157.5 cm (62.01\")   Wt 76.7 kg (169 lb)   SpO2 98%   BMI 30.90 kg/m²   Office Visit on 03/23/2018   Component Date Value Ref Range Status   • TSH 03/23/2018 0.631  0.550 - 4.780 mIU/mL Final       Physical Exam   Constitutional: She is oriented to person, place, and time. Vital signs are normal. She appears well-developed and well-nourished.   Very pleasant 28-year-old female here with her daughter today.   HENT:   Head: Normocephalic.   Right Ear: External ear normal.   Left Ear: External ear normal.   Nose: Nose normal.   Mouth/Throat: Oropharynx is clear and moist.   Eyes: Pupils are equal, round, and reactive to light.   Neck: Normal range of motion. Neck supple. No tracheal deviation present. No thyromegaly present.   Cardiovascular: Normal rate, regular rhythm and normal heart sounds.  Exam reveals no gallop and no friction rub.    No murmur heard.  Pulmonary/Chest: Effort normal. No " respiratory distress. She has decreased breath sounds in the right lower field and the left lower field. She has no wheezes. She has no rhonchi. She has no rales. She exhibits no tenderness.   Loose productive cough is noted.   Abdominal: Soft. Bowel sounds are normal. She exhibits no distension and no mass. There is no tenderness. There is no rebound and no guarding.   Musculoskeletal:        Lumbar back: She exhibits decreased range of motion, tenderness and spasm.   Neurological: She is alert and oriented to person, place, and time. She has normal reflexes. No cranial nerve deficit or sensory deficit.   Reflex Scores:       Patellar reflexes are 2+ on the right side and 2+ on the left side.  CN 2-12 grossly intact    Skin: Skin is warm and dry. No rash noted. No erythema.   Psychiatric: She has a normal mood and affect. Her behavior is normal. Judgment and thought content normal.       Assessment/Plan     Problem List Items Addressed This Visit        Cardiovascular and Mediastinum    Essential hypertension    Relevant Medications    metoprolol succinate XL (TOPROL-XL) 25 MG 24 hr tablet       Respiratory    COPD with exacerbation (CMS/[facility])    Relevant Medications    MethylPREDNISolone (MEDROL, CHAR,) 4 MG tablet       Nervous and Auditory    Chronic midline low back pain with sciatica    Relevant Medications    cyclobenzaprine (FLEXERIL) 5 MG tablet    Other Relevant Orders    Urine Drug Screen - Urine, Clean Catch       Other    Fibromyalgia - Primary    Relevant Medications    gabapentin (NEURONTIN) 300 MG capsule    Other Relevant Orders    Urine Drug Screen - Urine, Clean Catch      Other Visit Diagnoses     Bilateral low back pain with sciatica, sciatica laterality unspecified, unspecified chronicity        Relevant Medications    cyclobenzaprine (FLEXERIL) 5 MG tablet        Patient declines consult to pulmonology and neurology due to cost and lack of insurance.    Start Medrol Dosepak.  Will request Néstor  and urine drug screen today.  We will start Neurontin 300 mg at bedtime #30 with 2 refills.  This should be helpful not only with her fibromyalgia pain but with her lumbar pain and sciatica as well.  I discussed possible side effects and reasons to stop this medication as well as risk for addiction and abuse.  As part of this patient's treatment plan they are being prescribed controlled substance/substances with potential for abuse. This patient has been made aware of the appropriate use of such medications, including potential risk of somnolence, limited ability to drive and / or work safely, and potential for overdose. It has also been made clear that these medications are for use by this patient only, without concomitant use of alcohol or other substances unless prescribed/advised by medical provider. Patient has completed controlled substance agreement detailing terms of continued prescribing of controlled substances including monitoring Néstor reports, drug screens and pill counts. The patient was asked and states they are not receiving narcotic medication from any there provider or any provider that this office has not been made aware of. History and physical exam exhibit continued safe and appropriate use of controlled substances.     Proper body mechanics has been reviewed and discussed today.            Current Outpatient Prescriptions:   •  cyclobenzaprine (FLEXERIL) 5 MG tablet, Take 1 tablet by mouth 3 (Three) Times a Day As Needed for Muscle Spasms., Disp: 90 tablet, Rfl: 5  •  lansoprazole (PREVACID) 30 MG capsule, Take 1 capsule by mouth Daily., Disp: 30 capsule, Rfl: 5  •  metoprolol succinate XL (TOPROL-XL) 25 MG 24 hr tablet, Take 1 tablet by mouth Daily., Disp: 30 tablet, Rfl: 5  •  raNITIdine (ZANTAC) 150 MG tablet, Take 1 tablet by mouth 2 (Two) Times a Day., Disp: 60 tablet, Rfl: 5  •  cephalexin (KEFLEX) 500 MG capsule, Take 1 capsule by mouth 3 (Three) Times a Day., Disp: 30 capsule, Rfl:  0  •  gabapentin (NEURONTIN) 300 MG capsule, Take 1 capsule by mouth Every Night., Disp: 30 capsule, Rfl: 2  •  MethylPREDNISolone (MEDROL, CHAR,) 4 MG tablet, Take as directed on package instructions., Disp: 1 each, Rfl: 0    Patient's Body mass index is 30.9 kg/m². BMI is above normal parameters. Recommendations include: exercise counseling and nutrition counseling.    I advised Elisabeth of the risks of continuing to use tobacco, and I provided her with tobacco cessation educational materials in the After Visit Summary.     During this visit, I spent 5 minutes counseling the patient regarding tobacco cessation.    I have discussed diagnosis in detail today allowing time for questions and answers. Pt is aware of reasons to seek urgent or emergent medical care as well as reasons to return to the clinic for evaluation. Possible side effects, interactions and progression of symptoms discussed as well. Pt / family states understanding.   Emotional support and active listening provided.     Breo one puff daily with 2 month supply and demonstration of use provided.   Call when needs further samples.    RTC 2-5 days if not improved, sooner if condition worsens/changes. Symptomatic care advised as well as reasons for urgent or emergent care. Pt / family state understanding.     Routine follow up every three months.  Will request additional imaging such as chest x-ray if not improving over the next week.      This document has been electronically signed by:  RIANA Tellez, NP-C

## 2018-10-05 ENCOUNTER — OFFICE VISIT (OUTPATIENT)
Dept: FAMILY MEDICINE CLINIC | Facility: CLINIC | Age: 28
End: 2018-10-05

## 2018-10-05 VITALS
BODY MASS INDEX: 29.44 KG/M2 | SYSTOLIC BLOOD PRESSURE: 120 MMHG | HEIGHT: 62 IN | TEMPERATURE: 98.9 F | DIASTOLIC BLOOD PRESSURE: 70 MMHG | OXYGEN SATURATION: 99 % | HEART RATE: 87 BPM | WEIGHT: 160 LBS

## 2018-10-05 DIAGNOSIS — N64.4 BREAST PAIN IN FEMALE: Primary | ICD-10-CM

## 2018-10-05 PROCEDURE — 99213 OFFICE O/P EST LOW 20 MIN: CPT | Performed by: PHYSICIAN ASSISTANT

## 2018-10-05 NOTE — PROGRESS NOTES
"Subjective   Elisabeth Hall is a 28 y.o. female.     Chief complaint-left breast bruising    History of Present Illness     Bruising left breast-  She complains of bruise-type lesions that have spontaneously erupted on the left breast beginning 4 days ago.  She denies any trauma.  She does have associated moderate burning intermittently with tenderness type pain in the left breast.    The following portions of the patient's history were reviewed and updated as appropriate: allergies, current medications, past family history, past medical history, past social history, past surgical history and problem list.    Review of Systems   Constitutional: Negative for activity change, appetite change and fever.   HENT: Negative for ear pain, sinus pressure and sore throat.    Eyes: Negative for pain and visual disturbance.   Respiratory: Negative for cough and chest tightness.    Cardiovascular: Negative for chest pain and palpitations.   Gastrointestinal: Negative for abdominal pain, constipation, diarrhea, nausea and vomiting.   Endocrine: Negative for polydipsia and polyuria.   Genitourinary: Negative for dysuria and frequency.   Musculoskeletal: Negative for back pain and myalgias.   Skin: Positive for color change. Negative for rash.        Left breast bruising and tenderness   Allergic/Immunologic: Negative for food allergies and immunocompromised state.   Neurological: Negative for dizziness, syncope and headaches.   Hematological: Negative for adenopathy. Does not bruise/bleed easily.   Psychiatric/Behavioral: Negative for hallucinations and suicidal ideas. The patient is not nervous/anxious.        /70   Pulse 87   Temp 98.9 °F (37.2 °C) (Oral)   Ht 157.5 cm (62.01\")   Wt 72.6 kg (160 lb)   LMP 09/29/2018   SpO2 99%   BMI 29.26 kg/m²   Office Visit on 03/23/2018   Component Date Value Ref Range Status   • TSH 03/23/2018 0.631  0.550 - 4.780 mIU/mL Final       Physical Exam   Constitutional: She appears " well-developed and well-nourished.   Cardiovascular: Normal rate, regular rhythm and normal heart sounds.    No murmur heard.  Pulmonary/Chest: Effort normal and breath sounds normal. She has no wheezes. Right breast exhibits no inverted nipple. Left breast exhibits mass (left breast is lumpy with no definitive mass noted), skin change (2 light purple round contusion noted left outer lower quadrant approx 1cm diameter) and tenderness (LUOQ and LLOQ). Left breast exhibits no inverted nipple and no nipple discharge.   Skin: Skin is warm.   Psychiatric: She has a normal mood and affect. Her behavior is normal.   Nursing note and vitals reviewed.      Assessment/Plan     Diagnoses and all orders for this visit:    Breast pain in female  -     US breast left complete; Future    Plan to further evaluate this issue with an ultrasound of the breast.             This document has been electronically signed by:  Chata Forman PA-C

## 2018-10-23 ENCOUNTER — APPOINTMENT (OUTPATIENT)
Dept: ULTRASOUND IMAGING | Facility: HOSPITAL | Age: 28
End: 2018-10-23

## 2018-10-31 ENCOUNTER — APPOINTMENT (OUTPATIENT)
Dept: ULTRASOUND IMAGING | Facility: HOSPITAL | Age: 28
End: 2018-10-31

## 2018-11-08 ENCOUNTER — HOSPITAL ENCOUNTER (OUTPATIENT)
Dept: MAMMOGRAPHY | Facility: HOSPITAL | Age: 28
Discharge: HOME | End: 2018-11-08

## 2018-11-08 ENCOUNTER — HOSPITAL ENCOUNTER (OUTPATIENT)
Dept: ULTRASOUND IMAGING | Facility: HOSPITAL | Age: 28
Discharge: HOME | End: 2018-11-08
Admitting: PHYSICIAN ASSISTANT

## 2018-11-08 DIAGNOSIS — N63.0 LUMP OR MASS IN BREAST: ICD-10-CM

## 2018-11-08 DIAGNOSIS — N64.4 BREAST PAIN IN FEMALE: ICD-10-CM

## 2018-11-08 PROCEDURE — 77066 DX MAMMO INCL CAD BI: CPT | Performed by: RADIOLOGY

## 2018-11-08 PROCEDURE — 77062 BREAST TOMOSYNTHESIS BI: CPT | Performed by: RADIOLOGY

## 2018-11-08 PROCEDURE — G0279 TOMOSYNTHESIS, MAMMO: HCPCS

## 2018-11-08 PROCEDURE — 77066 DX MAMMO INCL CAD BI: CPT

## 2018-11-09 ENCOUNTER — TELEPHONE (OUTPATIENT)
Dept: FAMILY MEDICINE CLINIC | Facility: CLINIC | Age: 28
End: 2018-11-09

## 2018-12-07 ENCOUNTER — OFFICE VISIT (OUTPATIENT)
Dept: FAMILY MEDICINE CLINIC | Facility: CLINIC | Age: 28
End: 2018-12-07

## 2018-12-07 VITALS
BODY MASS INDEX: 28.16 KG/M2 | HEART RATE: 87 BPM | TEMPERATURE: 99.4 F | WEIGHT: 153 LBS | HEIGHT: 62 IN | OXYGEN SATURATION: 98 % | SYSTOLIC BLOOD PRESSURE: 102 MMHG | DIASTOLIC BLOOD PRESSURE: 70 MMHG

## 2018-12-07 DIAGNOSIS — M54.41 ACUTE RIGHT-SIDED LOW BACK PAIN WITH RIGHT-SIDED SCIATICA: Primary | ICD-10-CM

## 2018-12-07 PROCEDURE — 99213 OFFICE O/P EST LOW 20 MIN: CPT | Performed by: PHYSICIAN ASSISTANT

## 2018-12-07 RX ORDER — PREDNISONE 10 MG/1
10 TABLET ORAL DAILY
Qty: 15 TABLET | Refills: 0 | Status: SHIPPED | OUTPATIENT
Start: 2018-12-07 | End: 2019-01-21

## 2018-12-09 RX ORDER — METHYLPREDNISOLONE 4 MG/1
TABLET ORAL
Qty: 21 TABLET | Refills: 0 | Status: SHIPPED | OUTPATIENT
Start: 2018-12-09 | End: 2019-01-21

## 2018-12-10 NOTE — PROGRESS NOTES
Subjective   Elisabeth aHll is a 28 y.o. female.       Chief Complaint -  low back pain    History of Present Illness -      Low back pain-  She complains of bilateral low back pain that radiates to bilateral legs.  Pain is described as varying from mild to severe burning.  Some relief with changing her position.  Some relief with laying supine.  She does have associated numbness and tingling in her legs.  Onset 2 weeks.  She history of back problems.    5/2/2015 MRI of the lumbar spine revealed degenerative changes.  Disc desiccation with small broad-based posterior disc bulge at L4-5 and broad based posterior disc bulge causing minimal central canal stenosis and minimal bilateral neural foraminal stenosis at L5-S1.    The following portions of the patient's history were reviewed and updated as appropriate: allergies, current medications, past family history, past medical history, past social history, past surgical history and problem list.    Review of Systems   Constitutional: Negative for activity change, appetite change and fever.   HENT: Negative for ear pain, sinus pressure and sore throat.    Eyes: Negative for pain and visual disturbance.   Respiratory: Negative for cough and chest tightness.    Cardiovascular: Negative for chest pain and palpitations.   Gastrointestinal: Negative for abdominal pain, constipation, diarrhea, nausea and vomiting.   Endocrine: Negative for polydipsia and polyuria.   Genitourinary: Negative for dysuria and frequency.   Musculoskeletal: Positive for back pain. Negative for myalgias.   Skin: Negative for color change and rash.   Allergic/Immunologic: Negative for food allergies and immunocompromised state.   Neurological: Negative for dizziness, syncope and headaches.   Hematological: Negative for adenopathy. Does not bruise/bleed easily.   Psychiatric/Behavioral: Negative for hallucinations and suicidal ideas. The patient is not nervous/anxious.        /70   Pulse 87    "Temp 99.4 °F (37.4 °C) (Oral)   Ht 157.5 cm (62.01\")   Wt 69.4 kg (153 lb)   LMP 12/01/2018   SpO2 98%   BMI 27.98 kg/m²   Office Visit on 03/23/2018   Component Date Value Ref Range Status   • TSH 03/23/2018 0.631  0.550 - 4.780 mIU/mL Final       Physical Exam   Cardiovascular: Normal rate, regular rhythm and normal heart sounds.   No murmur heard.  Pulmonary/Chest: Effort normal and breath sounds normal. She has no wheezes.   Musculoskeletal: She exhibits tenderness.   Tenderness noted with muscle spasms to bilateral lumbar paraspinal musculature   Skin: Skin is warm. No rash noted. No erythema.   Psychiatric: She has a normal mood and affect. Her behavior is normal.   Nursing note and vitals reviewed.      Assessment/Plan     Diagnoses and all orders for this visit:    Acute right-sided low back pain with right-sided sciatica  -     predniSONE (DELTASONE) 10 MG tablet; Take 1 tablet by mouth Daily. 4 tabs qd x 2 days then 2 tabs qd x 2 days then 1 tab qd x 3 days      Since she does not have any medical insurance at this time she declines any radiology or physical therapy.           This document has been electronically signed by:  Chata Forman PA-C  "

## 2019-01-21 ENCOUNTER — OFFICE VISIT (OUTPATIENT)
Dept: FAMILY MEDICINE CLINIC | Facility: CLINIC | Age: 29
End: 2019-01-21

## 2019-01-21 VITALS
SYSTOLIC BLOOD PRESSURE: 110 MMHG | OXYGEN SATURATION: 99 % | DIASTOLIC BLOOD PRESSURE: 70 MMHG | BODY MASS INDEX: 27.98 KG/M2 | HEART RATE: 84 BPM | HEIGHT: 62 IN | TEMPERATURE: 98.5 F

## 2019-01-21 DIAGNOSIS — M54.40 BILATERAL LOW BACK PAIN WITH SCIATICA, SCIATICA LATERALITY UNSPECIFIED, UNSPECIFIED CHRONICITY: ICD-10-CM

## 2019-01-21 DIAGNOSIS — M51.16 LUMBAR DISC DISEASE WITH RADICULOPATHY: Primary | ICD-10-CM

## 2019-01-21 PROCEDURE — 99214 OFFICE O/P EST MOD 30 MIN: CPT | Performed by: NURSE PRACTITIONER

## 2019-01-21 RX ORDER — CYCLOBENZAPRINE HCL 5 MG
5 TABLET ORAL 3 TIMES DAILY PRN
Qty: 90 TABLET | Refills: 5 | Status: SHIPPED | OUTPATIENT
Start: 2019-01-21 | End: 2019-02-18 | Stop reason: SDUPTHER

## 2019-01-21 RX ORDER — HYDROCODONE BITARTRATE AND ACETAMINOPHEN 5; 325 MG/1; MG/1
1-2 TABLET ORAL EVERY 6 HOURS PRN
Qty: 45 TABLET | Refills: 0 | Status: SHIPPED | OUTPATIENT
Start: 2019-01-21 | End: 2019-04-29

## 2019-01-21 NOTE — PROGRESS NOTES
Subjective   Elisabeth Hall is a 29 y.o. female.     Chief Complaint   Patient presents with   • Back Pain       History of Present Illness:      Patient is here today for a work in visit complaining of exacerbation of low back pain with radiation down into her right lower extremity.  Patient was seen by the [facility] for consult regarding possible spinal surgery.  Patient reports that a nerve block was attempted last Tuesday which was not successful.  She was told by the [facility] that she would need to see a neuro surgeon for a more complex surgical evaluation.  Patient does have an MRI on file from a few years ago showing bulging disc.  Her symptoms have progressively became worse.  She is in a wheelchair today.  Unable to stand more than just a few minutes with support.  Rates her pain as 7-8 on a pain scale rating of 0-10.  Patient has a bottle of Norco from one year ago that she has been breaking in half trying to decrease her pain.  She has no history of substance abuse or addiction.  She describes her pain as sharp, constant and stinging with radiation going down into her right hip and lower extremity.  Her right leg does feel heavy and numb at times.  She does report that she has had some urinary leakage but no changes in bowel status.  Patient is scheduled to see [facility] orthopedics on 02/11/2019.  She must have a copy of her MRI which was performed last month at the [facility] to take to that consult.  He is accompanied today by her  and daughter.  Patient reports that heat makes her pain worse.  Ice does seem to help mildly.  She is taking Flexeril which helps some with the muscle spasm.  She has been to physical therapy in the past with no improvement.  Patient does not have health insurance.    GERD-stable with Prevacid.     Patient continues to smoke approximately one pack a day.    Pretension-stable with  "metoprolol.      The following portions of the patient's history were reviewed and updated as appropriate:  Allergies, current medications, past family history, past medical history, past social history, past surgical history and problem list.    Review of Systems   Constitutional: Positive for activity change. Negative for appetite change, fatigue and unexpected weight change.   HENT: Negative for congestion, ear pain, nosebleeds, postnasal drip, rhinorrhea, sore throat, trouble swallowing and voice change.    Eyes: Negative for pain and visual disturbance.   Respiratory: Negative for cough, chest tightness, shortness of breath and wheezing.    Cardiovascular: Negative for chest pain and palpitations.   Gastrointestinal: Negative for abdominal pain, blood in stool, constipation and diarrhea.   Endocrine: Negative for cold intolerance and polydipsia.   Genitourinary: Positive for urgency. Negative for difficulty urinating, flank pain and hematuria.   Musculoskeletal: Positive for arthralgias, back pain and gait problem. Negative for joint swelling, myalgias and neck stiffness.   Skin: Negative for color change and rash.   Allergic/Immunologic: Negative.    Neurological: Negative for syncope, numbness and headaches.   Hematological: Negative.    Psychiatric/Behavioral: Positive for sleep disturbance (due to acute pain ). Negative for self-injury and suicidal ideas.   All other systems reviewed and are negative.      Objective     /70   Pulse 84   Temp 98.5 °F (36.9 °C) (Tympanic)   Ht 157.5 cm (62.01\")   LMP 01/14/2019   SpO2 99%   BMI 27.98 kg/m²   Office Visit on 03/23/2018   Component Date Value Ref Range Status   • TSH 03/23/2018 0.631  0.550 - 4.780 mIU/mL Final       Physical Exam   Constitutional: She is oriented to person, place, and time. Vital signs are normal. She appears well-developed and well-nourished.   Sitting in wheel chair in exam room with her  and daughter. Sitting with right " leg rotated inward and leaning off her right hip.    HENT:   Head: Normocephalic and atraumatic.   Right Ear: External ear normal.   Left Ear: External ear normal.   Nose: Nose normal.   Mouth/Throat: Oropharynx is clear and moist.   Eyes: EOM are normal. Pupils are equal, round, and reactive to light.   Neck: Normal range of motion. Neck supple.   Cardiovascular: Normal rate, regular rhythm, normal heart sounds and intact distal pulses.   No murmur heard.  Pulmonary/Chest: Effort normal and breath sounds normal. No respiratory distress. She has no wheezes. She has no rales. She exhibits no tenderness.   Abdominal: Soft. Bowel sounds are normal. She exhibits no distension and no mass. There is no tenderness. There is no rebound and no guarding.   Musculoskeletal:        Lumbar back: She exhibits decreased range of motion, tenderness, pain and spasm.   Decreased lumbar curvature, there is pain with forward flexion. DTR's +2. No edema.    Neurological: She is alert and oriented to person, place, and time. She has normal reflexes. She displays atrophy (right leg ).   Reflex Scores:       Patellar reflexes are 2+ on the right side and 2+ on the left side.  Skin: Skin is warm and dry. No rash noted. No erythema. No pallor.   Psychiatric: She has a normal mood and affect. Her behavior is normal. Judgment and thought content normal. Her affect is not inappropriate. Cognition and memory are normal.   Denies thoughts of hurting self or others.   Nursing note and vitals reviewed.      Assessment/Plan     Problem List Items Addressed This Visit     None      Visit Diagnoses     Lumbar disc disease with radiculopathy    -  Primary    Relevant Orders    Ambulatory Referral to Physical Therapy Evaluate and treat; Heat, Electrotherapy; Moist heat, Ultrasound (Completed)    Urine Drug Screen - Urine, Clean Catch    Bilateral low back pain with sciatica, sciatica laterality unspecified, unspecified chronicity        Relevant  Medications    cyclobenzaprine (FLEXERIL) 5 MG tablet          Proper body mechanics has been reviewed and discussed today.  As part of this patient's treatment plan they are being prescribed controlled substance/substances with potential for abuse. This patient has been made aware of the appropriate use of such medications, including potential risk of somnolence, limited ability to drive and / or work safely, and potential for overdose. It has also been made clear that these medications are for use by this patient only, without concomitant use of alcohol or other substances unless prescribed/advised by medical provider. Patient has completed controlled substance agreement detailing terms of continued prescribing of controlled substances including monitoring Néstor reports, drug screens and pill counts. The patient was asked and states they are not receiving narcotic medication from any there provider or any provider that this office has not been made aware of. History and physical exam exhibit continued safe and appropriate use of controlled substances.   Goal: Improved quality of life and reduction in pain as evidenced by pt report.   Néstor has been reviewed as consistent.  UDS is on file.   Norco 5-325 mg 1-2 po q 4-6 hours as needed # 45, no refill.     Patient has been instructed and counseled regarding opioid misuse and risk of addiction.  We have discussed proper storage and disposal of controlled medication.       Patient's Body mass index is 27.98 kg/m². BMI is above normal parameters. Recommendations include: educational material, exercise counseling and nutrition counseling.    I advised Elisabeth of the risks of continuing to use tobacco, and I provided her with tobacco cessation educational materials in the After Visit Summary.     During this visit, I spent 3 minutes counseling the patient regarding tobacco cessation.    I have discussed diagnosis in detail today allowing time for questions and answers.  Patient is aware of reasons to seek urgent or emergent medical care as well as reasons to return to the clinic for evaluation. Possible side effects, interactions and progression of symptoms discussed as well. Patient / family states understanding.   Emotional support and active listening provided.     I have [facility] as well as a hard copy of the MRI.   Follow up in 6 weeks, sooner if needed.           This document has been electronically signed by:  RIANA Tellez, NP-C

## 2019-02-18 DIAGNOSIS — M54.40 BILATERAL LOW BACK PAIN WITH SCIATICA, SCIATICA LATERALITY UNSPECIFIED, UNSPECIFIED CHRONICITY: ICD-10-CM

## 2019-02-18 RX ORDER — CYCLOBENZAPRINE HCL 5 MG
5 TABLET ORAL 3 TIMES DAILY PRN
Qty: 90 TABLET | Refills: 5 | Status: SHIPPED | OUTPATIENT
Start: 2019-02-18 | End: 2019-04-29 | Stop reason: SDUPTHER

## 2019-02-18 NOTE — TELEPHONE ENCOUNTER
Regarding: Prescription Question  Contact: [phone]  ----- Message from [employer], Generic sent at 2/16/2019  9:48 PM EST -----    My prescription for cyclobenzapr 5mg has no refills available. I will take my last pills on 2/17/19. I do not have an appointment until March, can you please send in a script for this medicine.

## 2019-04-29 ENCOUNTER — OFFICE VISIT (OUTPATIENT)
Dept: FAMILY MEDICINE CLINIC | Facility: CLINIC | Age: 29
End: 2019-04-29

## 2019-04-29 VITALS
BODY MASS INDEX: 29.08 KG/M2 | OXYGEN SATURATION: 99 % | HEIGHT: 62 IN | SYSTOLIC BLOOD PRESSURE: 110 MMHG | TEMPERATURE: 99.3 F | DIASTOLIC BLOOD PRESSURE: 70 MMHG | HEART RATE: 94 BPM | WEIGHT: 158 LBS

## 2019-04-29 DIAGNOSIS — G89.29 CHRONIC MIDLINE LOW BACK PAIN WITH RIGHT-SIDED SCIATICA: Primary | ICD-10-CM

## 2019-04-29 DIAGNOSIS — M54.41 CHRONIC MIDLINE LOW BACK PAIN WITH RIGHT-SIDED SCIATICA: Primary | ICD-10-CM

## 2019-04-29 DIAGNOSIS — J44.9 COPD MIXED TYPE (HCC): ICD-10-CM

## 2019-04-29 DIAGNOSIS — I10 ESSENTIAL HYPERTENSION: ICD-10-CM

## 2019-04-29 DIAGNOSIS — M54.40 BILATERAL LOW BACK PAIN WITH SCIATICA, SCIATICA LATERALITY UNSPECIFIED, UNSPECIFIED CHRONICITY: ICD-10-CM

## 2019-04-29 PROCEDURE — 99213 OFFICE O/P EST LOW 20 MIN: CPT | Performed by: NURSE PRACTITIONER

## 2019-04-29 RX ORDER — CYCLOBENZAPRINE HCL 5 MG
5 TABLET ORAL 3 TIMES DAILY PRN
Qty: 90 TABLET | Refills: 5 | Status: SHIPPED | OUTPATIENT
Start: 2019-04-29 | End: 2020-03-25

## 2019-04-29 RX ORDER — METOPROLOL SUCCINATE 25 MG/1
25 TABLET, EXTENDED RELEASE ORAL DAILY
Qty: 30 TABLET | Refills: 5 | Status: SHIPPED | OUTPATIENT
Start: 2019-04-29 | End: 2019-09-24 | Stop reason: SDUPTHER

## 2019-04-29 RX ORDER — LANSOPRAZOLE 30 MG/1
30 CAPSULE, DELAYED RELEASE ORAL DAILY
Qty: 30 CAPSULE | Refills: 5 | Status: SHIPPED | OUTPATIENT
Start: 2019-04-29 | End: 2020-02-05

## 2019-04-29 RX ORDER — RANITIDINE 150 MG/1
150 TABLET ORAL 2 TIMES DAILY
Qty: 60 TABLET | Refills: 5 | Status: SHIPPED | OUTPATIENT
Start: 2019-04-29 | End: 2020-02-05

## 2019-04-29 NOTE — ASSESSMENT & PLAN NOTE
Reviewed MRI showing bulging disc in every vertebrae of the lumbar spine.  Continue under the care of the laser [place].  Body mechanics reviewed.

## 2019-04-29 NOTE — ASSESSMENT & PLAN NOTE
COPD is improving with lifestyle modifications.  Patient to keep COPD diary.  Discussed monitoring symptoms and use of quick-relief medications and contacting us early in the course of exacerbations.  Warning signs of respiratory distress were reviewed with the patient.   Counseled to avoid exposure to cigarette smoke.

## 2019-08-05 ENCOUNTER — OFFICE VISIT (OUTPATIENT)
Dept: FAMILY MEDICINE CLINIC | Facility: CLINIC | Age: 29
End: 2019-08-05

## 2019-08-05 VITALS
WEIGHT: 149 LBS | TEMPERATURE: 99.2 F | HEIGHT: 62 IN | SYSTOLIC BLOOD PRESSURE: 120 MMHG | BODY MASS INDEX: 27.42 KG/M2 | DIASTOLIC BLOOD PRESSURE: 84 MMHG | OXYGEN SATURATION: 99 % | HEART RATE: 94 BPM

## 2019-08-05 DIAGNOSIS — J44.9 COPD MIXED TYPE (HCC): Primary | ICD-10-CM

## 2019-08-05 DIAGNOSIS — J06.9 ACUTE URI: ICD-10-CM

## 2019-08-05 DIAGNOSIS — D50.8 OTHER IRON DEFICIENCY ANEMIA: ICD-10-CM

## 2019-08-05 PROCEDURE — 96372 THER/PROPH/DIAG INJ SC/IM: CPT | Performed by: NURSE PRACTITIONER

## 2019-08-05 PROCEDURE — 99214 OFFICE O/P EST MOD 30 MIN: CPT | Performed by: NURSE PRACTITIONER

## 2019-08-05 RX ORDER — METHYLPREDNISOLONE ACETATE 80 MG/ML
80 INJECTION, SUSPENSION INTRA-ARTICULAR; INTRALESIONAL; INTRAMUSCULAR; SOFT TISSUE ONCE
Status: COMPLETED | OUTPATIENT
Start: 2019-08-05 | End: 2019-08-05

## 2019-08-05 RX ORDER — AZITHROMYCIN 250 MG/1
TABLET, FILM COATED ORAL
Qty: 6 TABLET | Refills: 0 | Status: SHIPPED | OUTPATIENT
Start: 2019-08-05 | End: 2019-08-05

## 2019-08-05 RX ORDER — GUAIFENESIN/DEXTROMETHORPHAN 100-10MG/5
5 SYRUP ORAL 4 TIMES DAILY PRN
Qty: 236 ML | Refills: 0 | Status: SHIPPED | OUTPATIENT
Start: 2019-08-05 | End: 2019-09-24

## 2019-08-05 RX ORDER — SULFAMETHOXAZOLE AND TRIMETHOPRIM 800; 160 MG/1; MG/1
1 TABLET ORAL 2 TIMES DAILY
Qty: 20 TABLET | Refills: 0 | Status: SHIPPED | OUTPATIENT
Start: 2019-08-05 | End: 2019-08-15

## 2019-08-05 RX ORDER — CEFTRIAXONE 1 G/1
1 INJECTION, POWDER, FOR SOLUTION INTRAMUSCULAR; INTRAVENOUS ONCE
Status: COMPLETED | OUTPATIENT
Start: 2019-08-05 | End: 2019-08-05

## 2019-08-05 RX ORDER — FERROUS SULFATE 325(65) MG
325 TABLET ORAL
Qty: 30 TABLET | Refills: 5 | Status: SHIPPED | OUTPATIENT
Start: 2019-08-05 | End: 2020-02-05

## 2019-08-05 RX ADMIN — CEFTRIAXONE 1 G: 1 INJECTION, POWDER, FOR SOLUTION INTRAMUSCULAR; INTRAVENOUS at 16:59

## 2019-08-05 RX ADMIN — METHYLPREDNISOLONE ACETATE 80 MG: 80 INJECTION, SUSPENSION INTRA-ARTICULAR; INTRALESIONAL; INTRAMUSCULAR; SOFT TISSUE at 17:04

## 2019-08-05 NOTE — ASSESSMENT & PLAN NOTE
Stop smoking.  Start Breo.  Patient instructed/demonstration provided on use of Brio inhaler.  Patient was able to demonstrate correct use of Brio inhaler.  Patient was instructed to rinse her mouth after use.  Strongly encourage patient to stop smoking.

## 2019-08-05 NOTE — PROGRESS NOTES
Subjective   Elisabeth Hall is a 29 y.o. female.     Chief Complaint   Patient presents with   • URI       History of Present Illness:    Does not have insurance and does not want to run up a big bill.      Uri symptoms for 2 weeks. Pain with deep breath and facial pain. Productive cough. Smokes a pack a day.  Ear pain and pressure.  Moderate intensity.    Smokes more more packs a day.  Has COPD.  Chronic cough.  Patient states that she is trying to lose weight so she has been smoking more.      The following portions of the patient's history were reviewed and updated as appropriate:  Allergies, current medications, past family history, past medical history, past social history, past surgical history and problem list.    Review of Systems   Constitutional: Positive for fatigue. Negative for appetite change and unexpected weight change.   HENT: Positive for ear pain. Negative for congestion, nosebleeds, postnasal drip, rhinorrhea, sore throat, trouble swallowing and voice change.    Eyes: Negative for pain and visual disturbance.   Respiratory: Positive for cough and shortness of breath. Negative for wheezing.    Cardiovascular: Negative for chest pain and palpitations.   Gastrointestinal: Negative for abdominal pain, blood in stool, constipation and diarrhea.   Endocrine: Negative for cold intolerance and polydipsia.   Genitourinary: Negative for difficulty urinating, flank pain and hematuria.   Musculoskeletal: Positive for arthralgias and back pain. Negative for gait problem, joint swelling and myalgias.   Skin: Negative for color change and rash.   Allergic/Immunologic: Negative.    Neurological: Negative for syncope, numbness and headaches.   Hematological: Negative.    Psychiatric/Behavioral: Negative for dysphoric mood, self-injury, sleep disturbance and suicidal ideas.   All other systems reviewed and are negative.      Objective     /84   Pulse 94   Temp 99.2 °F (37.3 °C) (Tympanic)   Ht 157.5 cm  "(62.01\")   Wt 67.6 kg (149 lb)   LMP 06/29/2019   SpO2 99%   BMI 27.24 kg/m²   Office Visit on 03/23/2018   Component Date Value Ref Range Status   • TSH 03/23/2018 0.631  0.550 - 4.780 mIU/mL Final       Physical Exam   Constitutional: She is oriented to person, place, and time. Vital signs are normal. She appears well-developed and well-nourished. She has a sickly appearance. No distress.   Ill-appearing 29-year-old female.  Distress.   HENT:   Head: Normocephalic.   Right Ear: Hearing and external ear normal. No lacerations. No drainage or swelling. No foreign bodies. No mastoid tenderness. A middle ear effusion is present.   Left Ear: Hearing and external ear normal. No lacerations. No drainage or swelling. No foreign bodies. No mastoid tenderness. A middle ear effusion is present.   Nose: Nose normal. No nose lacerations, sinus tenderness or nasal deformity. No epistaxis.  No foreign bodies.   Mouth/Throat: Uvula is midline and oropharynx is clear and moist. No oropharyngeal exudate or tonsillar abscesses.   TM's are cloudy bilateral   Eyes: Conjunctivae, EOM and lids are normal. Pupils are equal, round, and reactive to light. Right eye exhibits no discharge. Left eye exhibits no discharge.   Neck: Normal range of motion. Neck supple. No tracheal deviation present. No thyromegaly present.   Cardiovascular: Normal rate, regular rhythm, normal heart sounds and intact distal pulses. Exam reveals no gallop and no friction rub.   No murmur heard.  Pulmonary/Chest: Effort normal. No respiratory distress. She has decreased breath sounds in the right middle field and the right lower field. She has no wheezes. She has no rales. She exhibits no tenderness.   Cough noted    Abdominal: Soft. Normal appearance and bowel sounds are normal. She exhibits no distension and no mass. There is no tenderness. There is no rebound and no guarding.   Musculoskeletal:        Right hip: She exhibits decreased range of motion and " tenderness.        Lumbar back: She exhibits decreased range of motion and tenderness.   Lymphadenopathy:     She has no cervical adenopathy.        Right cervical: No deep cervical adenopathy present.       Left cervical: No deep cervical adenopathy present.   Neurological: She is alert and oriented to person, place, and time. She has normal reflexes.   CN 2-12 grossly intact    Skin: Skin is warm and dry. Capillary refill takes less than 2 seconds. No rash noted. She is not diaphoretic. No erythema.   Psychiatric: She has a normal mood and affect. Her speech is normal and behavior is normal. Judgment and thought content normal. Cognition and memory are normal.   Vitals reviewed.      Assessment/Plan     Problem List Items Addressed This Visit        Respiratory    COPD mixed type (CMS/HCC) - Primary    Current Assessment & Plan         Stop smoking.  Start Breo.  Patient instructed/demonstration provided on use of Brio inhaler.  Patient was able to demonstrate correct use of Brio inhaler.  Patient was instructed to rinse her mouth after use.  Strongly encourage patient to stop smoking.         Relevant Medications    methylPREDNISolone acetate (DEPO-medrol) injection 80 mg    guaifenesin-dextromethorphan (ROBITUSSIN DM) 100-10 MG/5ML syrup    Fluticasone Furoate-Vilanterol (BREO ELLIPTA) 200-25 MCG/INH inhaler       Hematopoietic and Hemostatic    Iron deficiency anemia    Current Assessment & Plan     Declines labs due to no insurance.  Start back on iron supplement daily.         Relevant Medications    ferrous sulfate 325 (65 FE) MG tablet      Other Visit Diagnoses     Acute URI        Symptomatic treatment.  Stop smoking.  Follow-up 2 to 3 days if not improved.    Relevant Medications    cefTRIAXone (ROCEPHIN) injection 1 g                   Patient's Body mass index is 27.24 kg/m². BMI is above normal parameters. Recommendations include: exercise counseling and nutrition counseling.    Elisabeth Hall is a  current cigarettes user.  She currently smokes 1 pack of cigarettes per day for a duration of 10 years. I have educated her on the risk of diseases from using tobacco products such as cancer, COPD and heart diease.     I advised her to quit and she is not willing to quit.    I spent 3  minutes counseling the patient.          I have discussed diagnosis in detail today allowing time for questions and answers. Patient is aware of reasons to seek urgent or emergent medical care as well as reasons to return to the clinic for evaluation. Possible side effects, interactions and progression of symptoms discussed as well. Patient / family states understanding.   Emotional support and active listening provided.       RTC 2-5 days if not improved, sooner if condition worsens/changes. Symptomatic care advised as well as reasons for urgent or emergent care. Pt / family state understanding.     Dictated utilizing Dragon dictation. Errors in dictation may reflect use of voice recognition software and not all errors in transcription may have been detected prior to signing.             This document has been electronically signed by:  RIANA Tellez, NP-C

## 2019-09-24 ENCOUNTER — OFFICE VISIT (OUTPATIENT)
Dept: FAMILY MEDICINE CLINIC | Facility: CLINIC | Age: 29
End: 2019-09-24

## 2019-09-24 VITALS
HEIGHT: 62 IN | BODY MASS INDEX: 27.6 KG/M2 | DIASTOLIC BLOOD PRESSURE: 90 MMHG | HEART RATE: 84 BPM | OXYGEN SATURATION: 97 % | WEIGHT: 150 LBS | SYSTOLIC BLOOD PRESSURE: 130 MMHG | TEMPERATURE: 99.4 F

## 2019-09-24 DIAGNOSIS — H65.02 ACUTE SEROUS OTITIS MEDIA OF LEFT EAR, RECURRENCE NOT SPECIFIED: Primary | ICD-10-CM

## 2019-09-24 DIAGNOSIS — I10 ESSENTIAL HYPERTENSION: ICD-10-CM

## 2019-09-24 PROCEDURE — 96372 THER/PROPH/DIAG INJ SC/IM: CPT | Performed by: NURSE PRACTITIONER

## 2019-09-24 PROCEDURE — 99213 OFFICE O/P EST LOW 20 MIN: CPT | Performed by: NURSE PRACTITIONER

## 2019-09-24 RX ORDER — CEFTRIAXONE 1 G/1
1 INJECTION, POWDER, FOR SOLUTION INTRAMUSCULAR; INTRAVENOUS ONCE
Status: COMPLETED | OUTPATIENT
Start: 2019-09-24 | End: 2019-09-24

## 2019-09-24 RX ORDER — SULFAMETHOXAZOLE AND TRIMETHOPRIM 800; 160 MG/1; MG/1
1 TABLET ORAL 2 TIMES DAILY
Qty: 20 TABLET | Refills: 0 | Status: SHIPPED | OUTPATIENT
Start: 2019-09-24 | End: 2019-10-04

## 2019-09-24 RX ORDER — FLUTICASONE PROPIONATE 50 MCG
SPRAY, SUSPENSION (ML) NASAL
Qty: 1 BOTTLE | Refills: 5 | Status: SHIPPED | OUTPATIENT
Start: 2019-09-24 | End: 2020-02-05

## 2019-09-24 RX ORDER — FLUCONAZOLE 150 MG/1
150 TABLET ORAL ONCE
Qty: 1 TABLET | Refills: 0 | Status: SHIPPED | OUTPATIENT
Start: 2019-09-24 | End: 2019-09-24

## 2019-09-24 RX ORDER — METOPROLOL SUCCINATE 25 MG/1
25 TABLET, EXTENDED RELEASE ORAL DAILY
Qty: 30 TABLET | Refills: 5 | Status: SHIPPED | OUTPATIENT
Start: 2019-09-24 | End: 2020-04-02 | Stop reason: SDUPTHER

## 2019-09-24 RX ORDER — METHYLPREDNISOLONE ACETATE 80 MG/ML
80 INJECTION, SUSPENSION INTRA-ARTICULAR; INTRALESIONAL; INTRAMUSCULAR; SOFT TISSUE ONCE
Status: COMPLETED | OUTPATIENT
Start: 2019-09-24 | End: 2019-09-24

## 2019-09-24 RX ORDER — CETIRIZINE HYDROCHLORIDE 10 MG/1
10 TABLET ORAL DAILY
Qty: 30 TABLET | Refills: 2 | Status: SHIPPED | OUTPATIENT
Start: 2019-09-24 | End: 2020-02-05

## 2019-09-24 RX ADMIN — CEFTRIAXONE 1 G: 1 INJECTION, POWDER, FOR SOLUTION INTRAMUSCULAR; INTRAVENOUS at 15:54

## 2019-09-24 RX ADMIN — METHYLPREDNISOLONE ACETATE 80 MG: 80 INJECTION, SUSPENSION INTRA-ARTICULAR; INTRALESIONAL; INTRAMUSCULAR; SOFT TISSUE at 15:54

## 2019-09-24 NOTE — PROGRESS NOTES
Subjective   Elisabeth Hall is a 29 y.o. female.     Chief Complaint   Patient presents with   • URI       History of Present Illness:    Patient is here today complaining of left ear pain radiating up into her head and down into her neck.  Pain is sharp and shooting.  Feels like pencil stabbing into her head.  Started 3 nights ago.  She has been running a fever greater than 100.  Temp is 99.4 today.  She has had antipruritic medication today.    Hypertension- is refill on her metoprolol.    Patient is proud to say she has quit smoking.  She and her  are saving $400 a month by smoke cessation.          The following portions of the patient's history were reviewed and updated as appropriate:  Allergies, current medications, past family history, past medical history, past social history, past surgical history and problem list.    Review of Systems   Constitutional: Positive for activity change, chills and fever. Negative for appetite change, fatigue and unexpected weight change.   HENT: Positive for ear pain. Negative for congestion, nosebleeds, postnasal drip, rhinorrhea, sinus pressure, sinus pain, sore throat, trouble swallowing and voice change.    Eyes: Negative for pain and visual disturbance.   Respiratory: Negative for cough, chest tightness, shortness of breath and wheezing.    Cardiovascular: Negative for chest pain and palpitations.   Gastrointestinal: Negative for abdominal pain, blood in stool, constipation and diarrhea.   Endocrine: Negative for cold intolerance and polydipsia.   Genitourinary: Negative for difficulty urinating, flank pain and hematuria.   Musculoskeletal: Negative for arthralgias, back pain, gait problem, joint swelling and myalgias.   Skin: Negative for color change and rash.   Allergic/Immunologic: Negative.    Neurological: Positive for dizziness and headaches (Left sided radiating up from her ear). Negative for syncope and numbness.   Hematological: Negative.   "  Psychiatric/Behavioral: Positive for sleep disturbance. Negative for dysphoric mood, self-injury and suicidal ideas.   All other systems reviewed and are negative.      Objective     /90   Pulse 84   Temp 99.4 °F (37.4 °C) (Tympanic)   Ht 157.5 cm (62.01\")   Wt 68 kg (150 lb)   LMP 09/07/2019   SpO2 97%   BMI 27.43 kg/m²   Office Visit on 03/23/2018   Component Date Value Ref Range Status   • TSH 03/23/2018 0.631  0.550 - 4.780 mIU/mL Final       Physical Exam   Constitutional: She is oriented to person, place, and time. She appears well-developed and well-nourished. She appears ill. No distress.   Ill-appearing 29-year-old female.  Febrile.   HENT:   Head: Normocephalic and atraumatic.   Right Ear: External ear normal. No drainage or swelling. Tympanic membrane is not retracted and not bulging. No middle ear effusion.   Left Ear: External ear normal. There is drainage and swelling. Tympanic membrane is bulging. Tympanic membrane is not perforated. A middle ear effusion is present.   Nose: Nose normal.   Mouth/Throat: Oropharynx is clear and moist. No oropharyngeal exudate.   Eyes: Conjunctivae, EOM and lids are normal. Pupils are equal, round, and reactive to light. Right eye exhibits no discharge. Left eye exhibits no discharge.   Neck: Normal range of motion. Neck supple. No tracheal deviation present. No thyromegaly present.   Cardiovascular: Normal rate, regular rhythm and normal heart sounds. Exam reveals no gallop and no friction rub.   No murmur heard.  Pulmonary/Chest: Effort normal and breath sounds normal. No respiratory distress. She has no wheezes. She has no rales. She exhibits no tenderness.   Abdominal: Soft. Normal appearance and bowel sounds are normal. She exhibits no distension and no mass. There is no tenderness. There is no rebound and no guarding.   Musculoskeletal: Normal range of motion.        Lumbar back: She exhibits tenderness.   Lymphadenopathy:        Head (left side): " Preauricular adenopathy present.     She has cervical adenopathy.        Left cervical: Deep cervical adenopathy present.   Neurological: She is alert and oriented to person, place, and time. She has normal reflexes.   CN 2-12 grossly intact    Skin: Skin is warm and dry. Capillary refill takes less than 2 seconds. No rash noted. She is not diaphoretic. No erythema.   Psychiatric: She has a normal mood and affect. Her speech is normal and behavior is normal. Judgment and thought content normal. Cognition and memory are normal.   Vitals reviewed.      Assessment/Plan     Problem List Items Addressed This Visit        Cardiovascular and Mediastinum    Essential hypertension    Relevant Medications    metoprolol succinate XL (TOPROL-XL) 25 MG 24 hr tablet      Other Visit Diagnoses     Acute serous otitis media of left ear, recurrence not specified    -  Primary    Relevant Medications    cefTRIAXone (ROCEPHIN) injection 1 g    methylPREDNISolone acetate (DEPO-medrol) injection 80 mg    fluticasone (FLONASE) 50 MCG/ACT nasal spray    sulfamethoxazole-trimethoprim (BACTRIM DS,SEPTRA DS) 800-160 MG per tablet    fluconazole (DIFLUCAN) 150 MG tablet    cetirizine (zyrTEC) 10 MG tablet          Fluids, rest, symptomatic treatment advised. Steam therapy. Dispose of tooth bush after 24 hours of starting antibiotics if ordered. Complete antibiotics as ordered.  Discussed possible side effects/interactions of medication. Discussed symptoms to report as well as reasons to seek urgent or emergent medical attention. Understanding stated.   Recommend follow up in 2-3 days if not improved, sooner if needed.        Patient's Body mass index is 27.43 kg/m². BMI is above normal parameters. Recommendations include: exercise counseling and nutrition counseling.      I have discussed diagnosis in detail today allowing time for questions and answers. Patient is aware of reasons to seek urgent or emergent medical care as well as reasons to  return to the clinic for evaluation. Possible side effects, interactions and progression of symptoms discussed as well. Patient / family states understanding.   Emotional support and active listening provided.       Praised pt for smoke cessation and encouraged her not to start back .    RTC 2-5 days if not improved, sooner if condition worsens/changes. Symptomatic care advised as well as reasons for urgent or emergent care. Pt / family state understanding.           This document has been electronically signed by:  RIANA Tellez, NP-C

## 2019-10-25 ENCOUNTER — LAB (OUTPATIENT)
Dept: FAMILY MEDICINE CLINIC | Facility: CLINIC | Age: 29
End: 2019-10-25

## 2019-10-25 DIAGNOSIS — J44.9 COPD MIXED TYPE (HCC): ICD-10-CM

## 2019-10-25 DIAGNOSIS — G89.29 CHRONIC MIDLINE LOW BACK PAIN WITH RIGHT-SIDED SCIATICA: ICD-10-CM

## 2019-10-25 DIAGNOSIS — I10 ESSENTIAL HYPERTENSION: ICD-10-CM

## 2019-10-25 DIAGNOSIS — M54.40 BILATERAL LOW BACK PAIN WITH SCIATICA, SCIATICA LATERALITY UNSPECIFIED, UNSPECIFIED CHRONICITY: ICD-10-CM

## 2019-10-25 DIAGNOSIS — M54.41 CHRONIC MIDLINE LOW BACK PAIN WITH RIGHT-SIDED SCIATICA: ICD-10-CM

## 2019-10-25 LAB
25(OH)D3 SERPL-MCNC: 15.7 NG/ML (ref 30–100)
ALBUMIN SERPL-MCNC: 4.4 G/DL (ref 3.5–5.2)
ALBUMIN UR-MCNC: <1.2 MG/DL
ALBUMIN/GLOB SERPL: 1.2 G/DL
ALP SERPL-CCNC: 80 U/L (ref 39–117)
ALT SERPL W P-5'-P-CCNC: 21 U/L (ref 1–33)
ANION GAP SERPL CALCULATED.3IONS-SCNC: 11.4 MMOL/L (ref 5–15)
AST SERPL-CCNC: 14 U/L (ref 1–32)
BASOPHILS # BLD AUTO: 0.07 10*3/MM3 (ref 0–0.2)
BASOPHILS NFR BLD AUTO: 0.7 % (ref 0–1.5)
BILIRUB SERPL-MCNC: 0.2 MG/DL (ref 0.2–1.2)
BUN BLD-MCNC: 11 MG/DL (ref 6–20)
BUN/CREAT SERPL: 15.3 (ref 7–25)
CALCIUM SPEC-SCNC: 9.3 MG/DL (ref 8.6–10.5)
CHLORIDE SERPL-SCNC: 104 MMOL/L (ref 98–107)
CHOLEST SERPL-MCNC: 175 MG/DL (ref 0–200)
CO2 SERPL-SCNC: 24.6 MMOL/L (ref 22–29)
CREAT BLD-MCNC: 0.72 MG/DL (ref 0.57–1)
DEPRECATED RDW RBC AUTO: 41.8 FL (ref 37–54)
EOSINOPHIL # BLD AUTO: 0.24 10*3/MM3 (ref 0–0.4)
EOSINOPHIL NFR BLD AUTO: 2.3 % (ref 0.3–6.2)
ERYTHROCYTE [DISTWIDTH] IN BLOOD BY AUTOMATED COUNT: 12.2 % (ref 12.3–15.4)
GFR SERPL CREATININE-BSD FRML MDRD: 95 ML/MIN/1.73
GLOBULIN UR ELPH-MCNC: 3.6 GM/DL
GLUCOSE BLD-MCNC: 84 MG/DL (ref 65–99)
HCT VFR BLD AUTO: 41.2 % (ref 34–46.6)
HDLC SERPL-MCNC: 48 MG/DL (ref 40–60)
HGB BLD-MCNC: 13.8 G/DL (ref 12–15.9)
IMM GRANULOCYTES # BLD AUTO: 0.04 10*3/MM3 (ref 0–0.05)
IMM GRANULOCYTES NFR BLD AUTO: 0.4 % (ref 0–0.5)
LDLC SERPL CALC-MCNC: 108 MG/DL (ref 0–100)
LDLC/HDLC SERPL: 2.25 {RATIO}
LYMPHOCYTES # BLD AUTO: 2.79 10*3/MM3 (ref 0.7–3.1)
LYMPHOCYTES NFR BLD AUTO: 26.7 % (ref 19.6–45.3)
MCH RBC QN AUTO: 31.4 PG (ref 26.6–33)
MCHC RBC AUTO-ENTMCNC: 33.5 G/DL (ref 31.5–35.7)
MCV RBC AUTO: 93.8 FL (ref 79–97)
MONOCYTES # BLD AUTO: 0.76 10*3/MM3 (ref 0.1–0.9)
MONOCYTES NFR BLD AUTO: 7.3 % (ref 5–12)
NEUTROPHILS # BLD AUTO: 6.53 10*3/MM3 (ref 1.7–7)
NEUTROPHILS NFR BLD AUTO: 62.6 % (ref 42.7–76)
NRBC BLD AUTO-RTO: 0 /100 WBC (ref 0–0.2)
PLATELET # BLD AUTO: 350 10*3/MM3 (ref 140–450)
PMV BLD AUTO: 9.5 FL (ref 6–12)
POTASSIUM BLD-SCNC: 4.3 MMOL/L (ref 3.5–5.2)
PROT SERPL-MCNC: 8 G/DL (ref 6–8.5)
RBC # BLD AUTO: 4.39 10*6/MM3 (ref 3.77–5.28)
SODIUM BLD-SCNC: 140 MMOL/L (ref 136–145)
TRIGL SERPL-MCNC: 94 MG/DL (ref 0–150)
TSH SERPL DL<=0.05 MIU/L-ACNC: 1.03 UIU/ML (ref 0.27–4.2)
VIT B12 BLD-MCNC: 447 PG/ML (ref 211–946)
VLDLC SERPL-MCNC: 18.8 MG/DL (ref 5–40)
WBC NRBC COR # BLD: 10.43 10*3/MM3 (ref 3.4–10.8)

## 2019-10-25 PROCEDURE — 82306 VITAMIN D 25 HYDROXY: CPT | Performed by: NURSE PRACTITIONER

## 2019-10-25 PROCEDURE — 82607 VITAMIN B-12: CPT | Performed by: NURSE PRACTITIONER

## 2019-10-25 PROCEDURE — 84443 ASSAY THYROID STIM HORMONE: CPT | Performed by: NURSE PRACTITIONER

## 2019-10-25 PROCEDURE — 80053 COMPREHEN METABOLIC PANEL: CPT | Performed by: NURSE PRACTITIONER

## 2019-10-25 PROCEDURE — 85025 COMPLETE CBC W/AUTO DIFF WBC: CPT | Performed by: NURSE PRACTITIONER

## 2019-10-25 PROCEDURE — 80061 LIPID PANEL: CPT | Performed by: NURSE PRACTITIONER

## 2019-10-25 PROCEDURE — 82043 UR ALBUMIN QUANTITATIVE: CPT | Performed by: NURSE PRACTITIONER

## 2019-10-28 ENCOUNTER — TELEPHONE (OUTPATIENT)
Dept: FAMILY MEDICINE CLINIC | Facility: CLINIC | Age: 29
End: 2019-10-28

## 2019-10-28 ENCOUNTER — OFFICE VISIT (OUTPATIENT)
Dept: FAMILY MEDICINE CLINIC | Facility: CLINIC | Age: 29
End: 2019-10-28

## 2019-10-28 VITALS
SYSTOLIC BLOOD PRESSURE: 120 MMHG | OXYGEN SATURATION: 97 % | BODY MASS INDEX: 28.34 KG/M2 | HEIGHT: 62 IN | HEART RATE: 87 BPM | DIASTOLIC BLOOD PRESSURE: 70 MMHG | WEIGHT: 154 LBS | TEMPERATURE: 98.9 F

## 2019-10-28 DIAGNOSIS — K58.1 IRRITABLE BOWEL SYNDROME WITH CONSTIPATION: Primary | ICD-10-CM

## 2019-10-28 DIAGNOSIS — K21.00 ESOPHAGITIS, REFLUX: ICD-10-CM

## 2019-10-28 DIAGNOSIS — I10 ESSENTIAL HYPERTENSION: ICD-10-CM

## 2019-10-28 DIAGNOSIS — Z72.0 TOBACCO ABUSE: ICD-10-CM

## 2019-10-28 DIAGNOSIS — J44.9 COPD MIXED TYPE (HCC): ICD-10-CM

## 2019-10-28 PROCEDURE — 99214 OFFICE O/P EST MOD 30 MIN: CPT | Performed by: NURSE PRACTITIONER

## 2019-10-28 RX ORDER — ERGOCALCIFEROL 1.25 MG/1
50000 CAPSULE ORAL WEEKLY
Qty: 5 CAPSULE | Refills: 4 | Status: SHIPPED | OUTPATIENT
Start: 2019-10-28 | End: 2020-04-14

## 2019-10-28 RX ORDER — BUPROPION HYDROCHLORIDE 75 MG/1
75 TABLET ORAL 2 TIMES DAILY
Qty: 60 TABLET | Refills: 5 | Status: SHIPPED | OUTPATIENT
Start: 2019-10-28 | End: 2020-02-05

## 2019-10-28 RX ORDER — AMOXICILLIN 500 MG/1
CAPSULE ORAL
Refills: 0 | COMMUNITY
Start: 2019-10-25 | End: 2020-02-05

## 2019-10-28 NOTE — ASSESSMENT & PLAN NOTE
Over-the-counter laxatives on as-needed basis.  She may use a Linzess as samples are available.  Ensure adequate hydration.  Report any lack of bowel movement x3 days.

## 2019-10-28 NOTE — ASSESSMENT & PLAN NOTE
Stop smoking/continue PPI and H2 blocker.  Have reviewed possible side effects and interactions with patient stating desire to continue medications.

## 2019-10-28 NOTE — ASSESSMENT & PLAN NOTE
Start back after 7 weeks.  Stated that she just did not feel like herself without the nicotine.  We are going to go ahead and start her on Wellbutrin 75 mg twice daily and patient will  a nicotine gum over-the-counter.  I would like to see her back in approximately 4 weeks.  We will set a quit date for 4 weeks from today's date.

## 2019-10-28 NOTE — ASSESSMENT & PLAN NOTE
Hypertension is improving with lifestyle modifications.  Continue current treatment regimen.  Blood pressure will be reassessed at the next regular appointmentmetoprolol.

## 2019-10-28 NOTE — PROGRESS NOTES
Subjective   Elisabeth Hall is a 29 y.o. female.     Chief Complaint   Patient presents with   • Annual Exam     go over labs     Smoker  ibs  Hypertension      History of Present Illness:    Iron deficiency anemia-taking sulfate.  Reports she has improved energy.    IBS-improved with dietary changes.    Constipation-improved with dietary changes and occasional use of only and assess per samples.  Does not need samples today.    Reflux-much improved with weight loss, decrease in the amount she smokes and Zantac/Prevacid    Essential hypertension- stable with metoprolol, does take her metoprolol routinely.  Checks blood pressure randomly.    COPD mixed type- no recent exacerbation.  Had greatly improved prior to starting back smoking.      Review of Systems   Constitutional: Negative for activity change, appetite change, chills, fatigue and fever.   HENT: Negative for congestion, ear discharge, ear pain, facial swelling, hearing loss, sinus pressure, sore throat, trouble swallowing and voice change.    Eyes: Negative for pain, discharge and visual disturbance.   Respiratory: Negative for apnea, cough, chest tightness, shortness of breath and wheezing.    Cardiovascular: Negative for chest pain, palpitations and leg swelling.   Gastrointestinal: Negative for abdominal pain, blood in stool, constipation, diarrhea, nausea and vomiting.   Genitourinary: Negative for dysuria.   Musculoskeletal: Positive for arthralgias and back pain. Negative for neck stiffness.   Skin: Negative for color change.   Neurological: Negative for headaches.   Psychiatric/Behavioral: Negative for confusion, self-injury and suicidal ideas. The patient is not nervous/anxious.      Started back smoking after 7 weeks. Her family asked her to start back smoking because of mood changes.     The following portions of the patient's history and ROS were reviewed and updated as appropriate per provider:  Allergies, current medications, past family  "history, past medical history, past social history, past surgical history and problem list.        Objective     /70   Pulse 87   Temp 98.9 °F (37.2 °C) (Tympanic)   Ht 157.5 cm (62.01\")   Wt 69.9 kg (154 lb)   LMP 10/04/2019   SpO2 97%   BMI 28.16 kg/m²   Lab on 10/25/2019   Component Date Value Ref Range Status   • Glucose 10/25/2019 84  65 - 99 mg/dL Final   • BUN 10/25/2019 11  6 - 20 mg/dL Final   • Creatinine 10/25/2019 0.72  0.57 - 1.00 mg/dL Final   • Sodium 10/25/2019 140  136 - 145 mmol/L Final   • Potassium 10/25/2019 4.3  3.5 - 5.2 mmol/L Final   • Chloride 10/25/2019 104  98 - 107 mmol/L Final   • CO2 10/25/2019 24.6  22.0 - 29.0 mmol/L Final   • Calcium 10/25/2019 9.3  8.6 - 10.5 mg/dL Final   • Total Protein 10/25/2019 8.0  6.0 - 8.5 g/dL Final   • Albumin 10/25/2019 4.40  3.50 - 5.20 g/dL Final   • ALT (SGPT) 10/25/2019 21  1 - 33 U/L Final   • AST (SGOT) 10/25/2019 14  1 - 32 U/L Final   • Alkaline Phosphatase 10/25/2019 80  39 - 117 U/L Final   • Total Bilirubin 10/25/2019 0.2  0.2 - 1.2 mg/dL Final   • eGFR Non African Amer 10/25/2019 95  >60 mL/min/1.73 Final   • Globulin 10/25/2019 3.6  gm/dL Final   • A/G Ratio 10/25/2019 1.2  g/dL Final   • BUN/Creatinine Ratio 10/25/2019 15.3  7.0 - 25.0 Final   • Anion Gap 10/25/2019 11.4  5.0 - 15.0 mmol/L Final   • TSH 10/25/2019 1.030  0.270 - 4.200 uIU/mL Final   • Vitamin B-12 10/25/2019 447  211 - 946 pg/mL Final   • 25 Hydroxy, Vitamin D 10/25/2019 15.7* 30.0 - 100.0 ng/ml Final   • Total Cholesterol 10/25/2019 175  0 - 200 mg/dL Final   • Triglycerides 10/25/2019 94  0 - 150 mg/dL Final   • HDL Cholesterol 10/25/2019 48  40 - 60 mg/dL Final   • LDL Cholesterol  10/25/2019 108* 0 - 100 mg/dL Final   • VLDL Cholesterol 10/25/2019 18.8  5 - 40 mg/dL Final   • LDL/HDL Ratio 10/25/2019 2.25   Final   • Microalbumin, Urine 10/25/2019 <1.2  mg/dL Final   • WBC 10/25/2019 10.43  3.40 - 10.80 10*3/mm3 Final   • RBC 10/25/2019 4.39  3.77 - 5.28 " 10*6/mm3 Final   • Hemoglobin 10/25/2019 13.8  12.0 - 15.9 g/dL Final   • Hematocrit 10/25/2019 41.2  34.0 - 46.6 % Final   • MCV 10/25/2019 93.8  79.0 - 97.0 fL Final   • MCH 10/25/2019 31.4  26.6 - 33.0 pg Final   • MCHC 10/25/2019 33.5  31.5 - 35.7 g/dL Final   • RDW 10/25/2019 12.2* 12.3 - 15.4 % Final   • RDW-SD 10/25/2019 41.8  37.0 - 54.0 fl Final   • MPV 10/25/2019 9.5  6.0 - 12.0 fL Final   • Platelets 10/25/2019 350  140 - 450 10*3/mm3 Final   • Neutrophil % 10/25/2019 62.6  42.7 - 76.0 % Final   • Lymphocyte % 10/25/2019 26.7  19.6 - 45.3 % Final   • Monocyte % 10/25/2019 7.3  5.0 - 12.0 % Final   • Eosinophil % 10/25/2019 2.3  0.3 - 6.2 % Final   • Basophil % 10/25/2019 0.7  0.0 - 1.5 % Final   • Immature Grans % 10/25/2019 0.4  0.0 - 0.5 % Final   • Neutrophils, Absolute 10/25/2019 6.53  1.70 - 7.00 10*3/mm3 Final   • Lymphocytes, Absolute 10/25/2019 2.79  0.70 - 3.10 10*3/mm3 Final   • Monocytes, Absolute 10/25/2019 0.76  0.10 - 0.90 10*3/mm3 Final   • Eosinophils, Absolute 10/25/2019 0.24  0.00 - 0.40 10*3/mm3 Final   • Basophils, Absolute 10/25/2019 0.07  0.00 - 0.20 10*3/mm3 Final   • Immature Grans, Absolute 10/25/2019 0.04  0.00 - 0.05 10*3/mm3 Final   • nRBC 10/25/2019 0.0  0.0 - 0.2 /100 WBC Final       Physical Exam   Constitutional: She is oriented to person, place, and time. Vital signs are normal. She appears well-developed and well-nourished. No distress.   HENT:   Head: Normocephalic and atraumatic.   Right Ear: Tympanic membrane, external ear and ear canal normal.   Left Ear: Tympanic membrane, external ear and ear canal normal.   Nose: Nose normal.   Mouth/Throat: Oropharynx is clear and moist and mucous membranes are normal. No oropharyngeal exudate.   Eyes: Conjunctivae, EOM and lids are normal. Pupils are equal, round, and reactive to light. Right eye exhibits no discharge. Left eye exhibits no discharge.   Neck: Normal range of motion. Neck supple. No tracheal deviation present. No  thyromegaly present.   Cardiovascular: Normal rate, regular rhythm, normal heart sounds and intact distal pulses. Exam reveals no gallop and no friction rub.   No murmur heard.  Pulmonary/Chest: Effort normal and breath sounds normal. No respiratory distress. She has no wheezes. She has no rales. She exhibits no tenderness.   Abdominal: Soft. Normal appearance and bowel sounds are normal. She exhibits no distension and no mass. There is no tenderness. There is no rebound and no guarding.   Musculoskeletal: Normal range of motion.        Lumbar back: She exhibits tenderness, pain and spasm.   Decreased lumbar curvature, there is pain with forward flexion. DTR's +2. No edema.    Lymphadenopathy:     She has no cervical adenopathy.   Neurological: She is alert and oriented to person, place, and time. She has normal reflexes.   CN 2-12 grossly intact    Skin: Skin is warm and dry. Capillary refill takes less than 2 seconds. No rash noted. She is not diaphoretic. No erythema. No pallor.   Psychiatric: She has a normal mood and affect. Her speech is normal and behavior is normal. Judgment and thought content normal. Her affect is not inappropriate. Cognition and memory are normal.   Nursing note and vitals reviewed.      Assessment/Plan     Problem List Items Addressed This Visit        Cardiovascular and Mediastinum    Essential hypertension    Current Assessment & Plan     Hypertension is improving with lifestyle modifications.  Continue current treatment regimen.  Blood pressure will be reassessed at the next regular appointmentmetoprolol.            Respiratory    COPD mixed type (CMS/HCC)    Current Assessment & Plan     COPD is improving with lifestyle modifications.  Patient to keep COPD diary.  Discussed monitoring symptoms and use of quick-relief medications and contacting us early in the course of exacerbations.  Warning signs of respiratory distress were reviewed with the patient.   Counseled to avoid exposure  to cigarette smoke.                Digestive    Esophagitis, reflux    Current Assessment & Plan     Stop smoking/continue PPI and H2 blocker.  Have reviewed possible side effects and interactions with patient stating desire to continue medications.         IBS (irritable bowel syndrome) - Primary    Current Assessment & Plan     Over-the-counter laxatives on as-needed basis.  She may use a Linzess as samples are available.  Ensure adequate hydration.  Report any lack of bowel movement x3 days.            Other    Tobacco abuse    Current Assessment & Plan     Start back after 7 weeks.  Stated that she just did not feel like herself without the nicotine.  We are going to go ahead and start her on Wellbutrin 75 mg twice daily and patient will  a nicotine gum over-the-counter.  I would like to see her back in approximately 4 weeks.  We will set a quit date for 4 weeks from today's date.             Labs reviewed and discussed.  Patient will take an over-the-counter vitamin D supplement.  She will also work on diet and heart healthy diet/lifestyle because her triglyceride levels are mildly elevated.     Patient's Body mass index is 28.16 kg/m². BMI is above normal parameters. Recommendations include: exercise counseling and nutrition counseling.        I have discussed diagnosis in detail today allowing time for questions and answers. Patient is aware of reasons to seek urgent or emergent medical care as well as reasons to return to the clinic for evaluation. Possible side effects, interactions and progression of symptoms discussed as well. Patient / family states understanding.   Emotional support and active listening provided.       Follow up in 3 months. Routine labs fasting one week prior to next office visit. Return sooner if needed.             This document has been electronically signed by:  RIANA Tellez, NP-C

## 2019-10-28 NOTE — TELEPHONE ENCOUNTER
----- Message from RIANA Samuel sent at 10/26/2019  5:35 PM EDT -----  Vit d 50,000 iu weekly # 12 with 2 refills. Let her know it is critically low.

## 2019-12-20 NOTE — TELEPHONE ENCOUNTER
----- Message from DEAN Carroll sent at 11/8/2018  2:40 PM EST -----  Please inform the patient that her mammogram was benign    Called and let pt know her mammogram was good   Yes

## 2020-02-05 ENCOUNTER — OFFICE VISIT (OUTPATIENT)
Dept: FAMILY MEDICINE CLINIC | Facility: CLINIC | Age: 30
End: 2020-02-05

## 2020-02-05 VITALS
TEMPERATURE: 99 F | HEIGHT: 62 IN | DIASTOLIC BLOOD PRESSURE: 80 MMHG | BODY MASS INDEX: 27.6 KG/M2 | SYSTOLIC BLOOD PRESSURE: 120 MMHG | WEIGHT: 150 LBS | HEART RATE: 95 BPM | OXYGEN SATURATION: 98 %

## 2020-02-05 DIAGNOSIS — K21.00 ESOPHAGITIS, REFLUX: ICD-10-CM

## 2020-02-05 DIAGNOSIS — K58.1 IRRITABLE BOWEL SYNDROME WITH CONSTIPATION: Primary | ICD-10-CM

## 2020-02-05 PROBLEM — L02.214 ABSCESS OF GROIN, LEFT: Status: RESOLVED | Noted: 2017-01-17 | Resolved: 2020-02-05

## 2020-02-05 PROCEDURE — 99214 OFFICE O/P EST MOD 30 MIN: CPT | Performed by: NURSE PRACTITIONER

## 2020-02-05 RX ORDER — LANSOPRAZOLE 30 MG/1
30 CAPSULE, DELAYED RELEASE ORAL DAILY
Qty: 30 CAPSULE | Refills: 5 | Status: SHIPPED | OUTPATIENT
Start: 2020-02-05 | End: 2020-02-20

## 2020-02-05 RX ORDER — CIPROFLOXACIN 500 MG/1
500 TABLET, FILM COATED ORAL 2 TIMES DAILY
Qty: 14 TABLET | Refills: 0 | Status: SHIPPED | OUTPATIENT
Start: 2020-02-05 | End: 2020-02-20

## 2020-02-05 NOTE — ASSESSMENT & PLAN NOTE
Suspect possible diverticulitis.  Negative jar tenderness.  No abdominal pain with deep palpation.  Start taking Prevacid in the morning.  Take vitamin D prior to bedtime.  GERD precautions reviewed and discussed.  Liquid diet x3 days.  Avoid all dairy.  Start Cipro 500 mg twice daily x7 days.  Discussed possible differentials including IBS exacerbation, diverticulitis, appendicitis or other.  Discussed risk of antibiotics such as C. difficile and resistance.  We reviewed signs and symptoms for patient to monitor for and report immediately such as but not limited to changes in bowel function, abnormal bleeding, fever, changes or worsening of abdominal pain, jar tenderness, or any other changes.  Seek immediate medical attention with any changes or worsening of symptoms.  Patient states understanding.

## 2020-02-05 NOTE — PROGRESS NOTES
Subjective   Elisabeth Hall is a 30 y.o. female.     Chief Complaint   Patient presents with   • Abdominal Pain     only after she eats       History of Present Illness:    Pt is having fat and mucus in her stool. She has IBS with usual constipation. Liquids does not hurt her stomach. If she eats solid foods she has left lower  Quad pain. Took some left over omnicef which made her symptoms better. Symptoms started back a few days after completing antibiotics.     Also having some neck pain.   Has chronic gerd.  She has quit her Prevacid.  Patient states that she was taking vitamin D in the morning and her blood pressure pill in the evening so she just quit taking her Prevacid because she can figure it when to take it.    Tubes are tied.  Menstruation cycle is regular.  Most recent menstrual cycle January 20, 2020.      Wants to patient-had good bowel movement yesterday.  No blood present.  Some mucus and fat in her stool.        The following portions of the patient's history, chief complaint and ROS were reviewed and updated as appropriate per provider:  Allergies, current medications, past family history, past medical history, past social history, past surgical history and problem list.      Review of Systems   Constitutional: Positive for appetite change, fatigue and fever (Resolved over the past few days). Negative for chills and diaphoresis.   HENT: Negative.    Eyes: Negative.    Respiratory: Negative.    Cardiovascular: Negative.    Gastrointestinal: Positive for abdominal pain (Left lower quadrant, only happens after she eats), constipation and nausea. Negative for anal bleeding and vomiting.   Endocrine: Negative.    Genitourinary: Negative for dysuria, flank pain, frequency and urgency.   Musculoskeletal: Positive for arthralgias, back pain and myalgias. Negative for gait problem, joint swelling, neck pain and neck stiffness.   Skin: Negative.    Allergic/Immunologic: Negative.    Neurological: Negative  "for seizures, speech difficulty, light-headedness, numbness and headaches.   Hematological: Negative.    Psychiatric/Behavioral: Negative for dysphoric mood, self-injury, sleep disturbance and suicidal ideas.       Objective     /80   Pulse 95   Temp 99 °F (37.2 °C) (Temporal)   Ht 157.5 cm (62.01\")   Wt 68 kg (150 lb)   LMP 01/20/2020   SpO2 98%   BMI 27.43 kg/m²   Lab on 10/25/2019   Component Date Value Ref Range Status   • Glucose 10/25/2019 84  65 - 99 mg/dL Final   • BUN 10/25/2019 11  6 - 20 mg/dL Final   • Creatinine 10/25/2019 0.72  0.57 - 1.00 mg/dL Final   • Sodium 10/25/2019 140  136 - 145 mmol/L Final   • Potassium 10/25/2019 4.3  3.5 - 5.2 mmol/L Final   • Chloride 10/25/2019 104  98 - 107 mmol/L Final   • CO2 10/25/2019 24.6  22.0 - 29.0 mmol/L Final   • Calcium 10/25/2019 9.3  8.6 - 10.5 mg/dL Final   • Total Protein 10/25/2019 8.0  6.0 - 8.5 g/dL Final   • Albumin 10/25/2019 4.40  3.50 - 5.20 g/dL Final   • ALT (SGPT) 10/25/2019 21  1 - 33 U/L Final   • AST (SGOT) 10/25/2019 14  1 - 32 U/L Final   • Alkaline Phosphatase 10/25/2019 80  39 - 117 U/L Final   • Total Bilirubin 10/25/2019 0.2  0.2 - 1.2 mg/dL Final   • eGFR Non African Amer 10/25/2019 95  >60 mL/min/1.73 Final   • Globulin 10/25/2019 3.6  gm/dL Final   • A/G Ratio 10/25/2019 1.2  g/dL Final   • BUN/Creatinine Ratio 10/25/2019 15.3  7.0 - 25.0 Final   • Anion Gap 10/25/2019 11.4  5.0 - 15.0 mmol/L Final   • TSH 10/25/2019 1.030  0.270 - 4.200 uIU/mL Final   • Vitamin B-12 10/25/2019 447  211 - 946 pg/mL Final   • 25 Hydroxy, Vitamin D 10/25/2019 15.7* 30.0 - 100.0 ng/ml Final   • Total Cholesterol 10/25/2019 175  0 - 200 mg/dL Final   • Triglycerides 10/25/2019 94  0 - 150 mg/dL Final   • HDL Cholesterol 10/25/2019 48  40 - 60 mg/dL Final   • LDL Cholesterol  10/25/2019 108* 0 - 100 mg/dL Final   • VLDL Cholesterol 10/25/2019 18.8  5 - 40 mg/dL Final   • LDL/HDL Ratio 10/25/2019 2.25   Final   • Microalbumin, Urine 10/25/2019 " <1.2  mg/dL Final   • WBC 10/25/2019 10.43  3.40 - 10.80 10*3/mm3 Final   • RBC 10/25/2019 4.39  3.77 - 5.28 10*6/mm3 Final   • Hemoglobin 10/25/2019 13.8  12.0 - 15.9 g/dL Final   • Hematocrit 10/25/2019 41.2  34.0 - 46.6 % Final   • MCV 10/25/2019 93.8  79.0 - 97.0 fL Final   • MCH 10/25/2019 31.4  26.6 - 33.0 pg Final   • MCHC 10/25/2019 33.5  31.5 - 35.7 g/dL Final   • RDW 10/25/2019 12.2* 12.3 - 15.4 % Final   • RDW-SD 10/25/2019 41.8  37.0 - 54.0 fl Final   • MPV 10/25/2019 9.5  6.0 - 12.0 fL Final   • Platelets 10/25/2019 350  140 - 450 10*3/mm3 Final   • Neutrophil % 10/25/2019 62.6  42.7 - 76.0 % Final   • Lymphocyte % 10/25/2019 26.7  19.6 - 45.3 % Final   • Monocyte % 10/25/2019 7.3  5.0 - 12.0 % Final   • Eosinophil % 10/25/2019 2.3  0.3 - 6.2 % Final   • Basophil % 10/25/2019 0.7  0.0 - 1.5 % Final   • Immature Grans % 10/25/2019 0.4  0.0 - 0.5 % Final   • Neutrophils, Absolute 10/25/2019 6.53  1.70 - 7.00 10*3/mm3 Final   • Lymphocytes, Absolute 10/25/2019 2.79  0.70 - 3.10 10*3/mm3 Final   • Monocytes, Absolute 10/25/2019 0.76  0.10 - 0.90 10*3/mm3 Final   • Eosinophils, Absolute 10/25/2019 0.24  0.00 - 0.40 10*3/mm3 Final   • Basophils, Absolute 10/25/2019 0.07  0.00 - 0.20 10*3/mm3 Final   • Immature Grans, Absolute 10/25/2019 0.04  0.00 - 0.05 10*3/mm3 Final   • nRBC 10/25/2019 0.0  0.0 - 0.2 /100 WBC Final       Physical Exam   Constitutional: She is oriented to person, place, and time. Vital signs are normal. She appears well-developed and well-nourished. No distress.   HENT:   Head: Normocephalic.   Right Ear: External ear normal.   Left Ear: External ear normal.   Nose: Nose normal.   Mouth/Throat: Oropharynx is clear and moist. No oropharyngeal exudate.   Eyes: Pupils are equal, round, and reactive to light. Conjunctivae, EOM and lids are normal. Right eye exhibits no discharge. Left eye exhibits no discharge.   Neck: Normal range of motion. Neck supple. No tracheal deviation present. No  thyromegaly present.   Cardiovascular: Normal rate, regular rhythm and normal heart sounds. Exam reveals no gallop and no friction rub.   No murmur heard.  Pulmonary/Chest: Effort normal and breath sounds normal. No respiratory distress. She has no wheezes. She has no rales. She exhibits no tenderness.   Abdominal: Soft. Normal appearance and bowel sounds are normal. She exhibits no distension and no mass. There is no hepatosplenomegaly. There is no tenderness. There is no rigidity, no rebound, no guarding and no CVA tenderness.   Patient able to jump from exam room table step down onto the exam floor with no jar tenderness.  Slap to bottom of her foot jarring her body with no tenderness.   Musculoskeletal: Normal range of motion.   Lymphadenopathy:     She has no cervical adenopathy.   Neurological: She is alert and oriented to person, place, and time. She has normal reflexes.   CN 2-12 grossly intact    Skin: Skin is warm and dry. Capillary refill takes less than 2 seconds. No rash noted. She is not diaphoretic. No erythema.   Psychiatric: She has a normal mood and affect. Her speech is normal and behavior is normal. Judgment and thought content normal. Cognition and memory are normal.   Vitals reviewed.      Assessment/Plan     Problem List Items Addressed This Visit        Digestive    Esophagitis, reflux    Current Assessment & Plan     Got back on Prevacid.         Relevant Medications    lansoprazole (PREVACID) 30 MG capsule    IBS (irritable bowel syndrome) - Primary    Current Assessment & Plan     Suspect possible diverticulitis.  Negative jar tenderness.  No abdominal pain with deep palpation.  Start taking Prevacid in the morning.  Take vitamin D prior to bedtime.  GERD precautions reviewed and discussed.  Liquid diet x3 days.  Avoid all dairy.  Start Cipro 500 mg twice daily x7 days.  Discussed possible differentials including IBS exacerbation, diverticulitis, appendicitis or other.  Discussed risk of  antibiotics such as C. difficile and resistance.  We reviewed signs and symptoms for patient to monitor for and report immediately such as but not limited to changes in bowel function, abnormal bleeding, fever, changes or worsening of abdominal pain, jar tenderness, or any other changes.  Seek immediate medical attention with any changes or worsening of symptoms.  Patient states understanding.         Relevant Medications    ciprofloxacin (CIPRO) 500 MG tablet              No far tenderness. Pt is able to hop down from exam table step onto the floor     Patient's Body mass index is 27.43 kg/m². BMI is above normal parameters. Recommendations include: nutrition counseling.    Elisabeth Hall  reports that she has been smoking cigarettes. She has been smoking about 0.50 packs per day. She has never used smokeless tobacco.. I have educated her on the risk of diseases from using tobacco products such as cancer, COPD and heart diease.     I advised her to quit and she is not willing to quit.    I spent 3  minutes counseling the patient.       Liquid diet next 3 days. No dairy or acidic foods.     I have discussed diagnosis in detail today allowing time for questions and answers. Patient is aware of reasons to seek urgent or emergent medical care as well as reasons to return to the clinic for evaluation. Possible side effects, interactions and progression of symptoms discussed as well. Patient / family states understanding.   Emotional support and active listening provided.     I would like to see her back in approximately 1-2 weeks, sooner if needed.  Seek emergency medical attention with any worsening or changes in symptoms.            This document has been electronically signed by:  RIANA Tellez, NP-C  Answers for HPI/ROS submitted by the patient on 2/5/2020   Abdominal pain  What is the primary reason for your visit?: Abdominal Pain  Chronicity: new  Onset: 1 to 4 weeks ago  Onset quality:  sudden  Frequency: 2 to 4 times per day  Episode duration: 2 hours  Progression since onset: waxing and waning  Pain location: periumbilical region  Pain - numeric: 7/10  Pain quality: aching, a sensation of fullness, sharp  Radiates to: RLQ, RUQ, epigastric region, back  anorexia: Yes  Aggravated by: eating  Relieved by: nothing

## 2020-02-20 ENCOUNTER — OFFICE VISIT (OUTPATIENT)
Dept: FAMILY MEDICINE CLINIC | Facility: CLINIC | Age: 30
End: 2020-02-20

## 2020-02-20 VITALS
DIASTOLIC BLOOD PRESSURE: 70 MMHG | HEIGHT: 62 IN | OXYGEN SATURATION: 95 % | SYSTOLIC BLOOD PRESSURE: 110 MMHG | HEART RATE: 89 BPM | TEMPERATURE: 99 F | BODY MASS INDEX: 26.87 KG/M2 | WEIGHT: 146 LBS

## 2020-02-20 DIAGNOSIS — R10.9 STOMACH PAIN: Primary | ICD-10-CM

## 2020-02-20 PROCEDURE — 99213 OFFICE O/P EST LOW 20 MIN: CPT | Performed by: NURSE PRACTITIONER

## 2020-02-20 RX ORDER — PANTOPRAZOLE SODIUM 40 MG/1
40 TABLET, DELAYED RELEASE ORAL 2 TIMES DAILY
COMMUNITY
End: 2020-04-16

## 2020-02-20 NOTE — PROGRESS NOTES
Subjective   Elisabeth Hall is a 30 y.o. female.     Chief Complaint   Patient presents with   • Abdominal Pain     ER follow up   History of Present Illness:    Pt went to the ER last week with stomach pain. Diet changes have not improved her pain. She has stomach pain only after she eats. No diarrhea just an aching grinding pain in her stomach and epigastric region after she eats most foods. Has found that french fries does not hurt her stomach. She had a relatively normal CT in the ED. Is taking a PPI which is not helpful. Prilosec was stopped and protonix was started. No recent GI evaluation. Has tried stopping dairy. Not drinking any soda. She is eating very little and drinking very little due to stomach pain.   Pt reports that her labs and H. Pylori testing was negative though I am having difficulty obtaining a copy. She has no insurance and would like to avoid costly testing.     The following portions of the patient's history and ROS were reviewed and updated as appropriate per provider:  Allergies, current medications, past family history, past medical history, past social history, past surgical history and problem list.    Review of Systems   Constitutional: Positive for activity change, appetite change, fatigue and unexpected weight change. Negative for chills, diaphoresis and fever.   HENT: Negative.    Eyes: Negative.    Respiratory: Negative for cough, chest tightness, shortness of breath and wheezing.    Cardiovascular: Negative for chest pain, palpitations and leg swelling.   Gastrointestinal: Positive for abdominal pain, constipation and nausea. Negative for blood in stool.   Endocrine: Negative.    Genitourinary: Negative for difficulty urinating, dysuria, frequency, hematuria and urgency.   Musculoskeletal: Positive for arthralgias and back pain. Negative for neck pain and neck stiffness.   Skin: Negative.    Allergic/Immunologic: Negative for environmental allergies, food allergies and  "immunocompromised state.   Neurological: Negative for facial asymmetry, weakness and light-headedness.   Hematological: Negative.    Psychiatric/Behavioral: Positive for sleep disturbance. Negative for behavioral problems, self-injury and suicidal ideas. The patient is not nervous/anxious.        Objective     /70   Pulse 89   Temp 99 °F (37.2 °C) (Temporal)   Ht 157.5 cm (62.01\")   Wt 66.2 kg (146 lb)   LMP 02/17/2020   SpO2 95%   BMI 26.70 kg/m²   Lab on 10/25/2019   Component Date Value Ref Range Status   • Glucose 10/25/2019 84  65 - 99 mg/dL Final   • BUN 10/25/2019 11  6 - 20 mg/dL Final   • Creatinine 10/25/2019 0.72  0.57 - 1.00 mg/dL Final   • Sodium 10/25/2019 140  136 - 145 mmol/L Final   • Potassium 10/25/2019 4.3  3.5 - 5.2 mmol/L Final   • Chloride 10/25/2019 104  98 - 107 mmol/L Final   • CO2 10/25/2019 24.6  22.0 - 29.0 mmol/L Final   • Calcium 10/25/2019 9.3  8.6 - 10.5 mg/dL Final   • Total Protein 10/25/2019 8.0  6.0 - 8.5 g/dL Final   • Albumin 10/25/2019 4.40  3.50 - 5.20 g/dL Final   • ALT (SGPT) 10/25/2019 21  1 - 33 U/L Final   • AST (SGOT) 10/25/2019 14  1 - 32 U/L Final   • Alkaline Phosphatase 10/25/2019 80  39 - 117 U/L Final   • Total Bilirubin 10/25/2019 0.2  0.2 - 1.2 mg/dL Final   • eGFR Non African Amer 10/25/2019 95  >60 mL/min/1.73 Final   • Globulin 10/25/2019 3.6  gm/dL Final   • A/G Ratio 10/25/2019 1.2  g/dL Final   • BUN/Creatinine Ratio 10/25/2019 15.3  7.0 - 25.0 Final   • Anion Gap 10/25/2019 11.4  5.0 - 15.0 mmol/L Final   • TSH 10/25/2019 1.030  0.270 - 4.200 uIU/mL Final   • Vitamin B-12 10/25/2019 447  211 - 946 pg/mL Final   • 25 Hydroxy, Vitamin D 10/25/2019 15.7* 30.0 - 100.0 ng/ml Final   • Total Cholesterol 10/25/2019 175  0 - 200 mg/dL Final   • Triglycerides 10/25/2019 94  0 - 150 mg/dL Final   • HDL Cholesterol 10/25/2019 48  40 - 60 mg/dL Final   • LDL Cholesterol  10/25/2019 108* 0 - 100 mg/dL Final   • VLDL Cholesterol 10/25/2019 18.8  5 - 40 " mg/dL Final   • LDL/HDL Ratio 10/25/2019 2.25   Final   • Microalbumin, Urine 10/25/2019 <1.2  mg/dL Final   • WBC 10/25/2019 10.43  3.40 - 10.80 10*3/mm3 Final   • RBC 10/25/2019 4.39  3.77 - 5.28 10*6/mm3 Final   • Hemoglobin 10/25/2019 13.8  12.0 - 15.9 g/dL Final   • Hematocrit 10/25/2019 41.2  34.0 - 46.6 % Final   • MCV 10/25/2019 93.8  79.0 - 97.0 fL Final   • MCH 10/25/2019 31.4  26.6 - 33.0 pg Final   • MCHC 10/25/2019 33.5  31.5 - 35.7 g/dL Final   • RDW 10/25/2019 12.2* 12.3 - 15.4 % Final   • RDW-SD 10/25/2019 41.8  37.0 - 54.0 fl Final   • MPV 10/25/2019 9.5  6.0 - 12.0 fL Final   • Platelets 10/25/2019 350  140 - 450 10*3/mm3 Final   • Neutrophil % 10/25/2019 62.6  42.7 - 76.0 % Final   • Lymphocyte % 10/25/2019 26.7  19.6 - 45.3 % Final   • Monocyte % 10/25/2019 7.3  5.0 - 12.0 % Final   • Eosinophil % 10/25/2019 2.3  0.3 - 6.2 % Final   • Basophil % 10/25/2019 0.7  0.0 - 1.5 % Final   • Immature Grans % 10/25/2019 0.4  0.0 - 0.5 % Final   • Neutrophils, Absolute 10/25/2019 6.53  1.70 - 7.00 10*3/mm3 Final   • Lymphocytes, Absolute 10/25/2019 2.79  0.70 - 3.10 10*3/mm3 Final   • Monocytes, Absolute 10/25/2019 0.76  0.10 - 0.90 10*3/mm3 Final   • Eosinophils, Absolute 10/25/2019 0.24  0.00 - 0.40 10*3/mm3 Final   • Basophils, Absolute 10/25/2019 0.07  0.00 - 0.20 10*3/mm3 Final   • Immature Grans, Absolute 10/25/2019 0.04  0.00 - 0.05 10*3/mm3 Final   • nRBC 10/25/2019 0.0  0.0 - 0.2 /100 WBC Final       Physical Exam   Constitutional: She is oriented to person, place, and time. Vital signs are normal. She appears well-developed and well-nourished. No distress.   Weight loss noted in the past 2 weeks    HENT:   Head: Normocephalic.   Right Ear: Tympanic membrane, external ear and ear canal normal.   Left Ear: Tympanic membrane, external ear and ear canal normal.   Nose: Nose normal. Right sinus exhibits no maxillary sinus tenderness and no frontal sinus tenderness. Left sinus exhibits no maxillary  sinus tenderness and no frontal sinus tenderness.   Mouth/Throat: Oropharynx is clear and moist. No oral lesions. No oropharyngeal exudate, posterior oropharyngeal edema or posterior oropharyngeal erythema.   Eyes: Pupils are equal, round, and reactive to light. Conjunctivae, EOM and lids are normal. Right eye exhibits no discharge. Left eye exhibits no discharge.   Neck: Normal range of motion. Neck supple. No tracheal deviation present. No thyromegaly present.   Cardiovascular: Normal rate, regular rhythm and normal heart sounds. Exam reveals no gallop and no friction rub.   No murmur heard.  Pulmonary/Chest: Effort normal and breath sounds normal. No respiratory distress. She has no wheezes. She has no rales. She exhibits no tenderness.   Abdominal: Soft. Normal appearance and bowel sounds are normal. She exhibits no distension and no mass. There is tenderness in the epigastric area. There is no rebound and no guarding.   Musculoskeletal: Normal range of motion.   Lymphadenopathy:     She has no cervical adenopathy.   Neurological: She is alert and oriented to person, place, and time. She has normal reflexes.   CN 2-12 grossly intact    Skin: Skin is warm and dry. Capillary refill takes less than 2 seconds. No rash noted. She is not diaphoretic. No erythema.   Psychiatric: She has a normal mood and affect. Her speech is normal and behavior is normal. Judgment and thought content normal. Cognition and memory are normal.   Vitals reviewed.      Assessment/Plan     Problem List Items Addressed This Visit        Nervous and Auditory    Stomach pain - Primary    Current Assessment & Plan     Reviewed recent CT result as negative other than some non-obstructing renal stones. Have requested other ED reports from the [facility] for review. Pt reports being informed labs were normal. I have encouraged her to do a bland diet with no dairy. I have also encouraged her to keep a food diary. She agrees to a GI  consult and this has been placed for staff to work on.          Relevant Orders    Ambulatory Referral to Gastroenterology (Completed)                   Patient's Body mass index is 26.7 kg/m². BMI is above normal parameters. Recommendations include: nutrition counseling.    Elisabeth Hall  reports that she has been smoking cigarettes. She has been smoking about 0.50 packs per day. She has never used smokeless tobacco.. I have educated her on the risk of diseases from using tobacco products such as cancer, COPD and heart diease.     I advised her to quit and she is not willing to quit.    I spent 3  minutes counseling the patient.     Continue Protonix. Pt request to see dr. Abad.   I have discussed diagnosis in detail today allowing time for questions and answers. Patient is aware of reasons to seek urgent or emergent medical care as well as reasons to return to the clinic for evaluation. Possible side effects, interactions and progression of symptoms discussed as well. Patient / family states understanding.   Emotional support and active listening provided.       Follow up in 6 weeks, sooner if needed.           This document has been electronically signed by:  RIANA Tellez, NP-C

## 2020-02-21 ENCOUNTER — TELEPHONE (OUTPATIENT)
Dept: SURGERY | Facility: CLINIC | Age: 30
End: 2020-02-21

## 2020-02-21 PROBLEM — R10.9 STOMACH PAIN: Status: ACTIVE | Noted: 2020-02-21

## 2020-02-21 NOTE — TELEPHONE ENCOUNTER
Canceled patient's appt for Tues. Referral states: Patient is scheduled 03/06/2020 @ 9 AM with Dr. Abad and is aware of her appt. Patient's referral was sent to the wrong department.

## 2020-02-21 NOTE — TELEPHONE ENCOUNTER
I called the patient and told her that Dr. Kemp will be in [place] today if she would like us to see her today. I also stated that her referral says urgent so if she can not come here and would rather go to [place] I can call over to the [place] office and see if they can get her in today. She said she could not come at all today as she has to work. I told her we will be in [place] Tuesday and she said that worked best for her. I scheduled her with Dr. Kemp in [place] Tuesday.

## 2020-03-17 ENCOUNTER — OFFICE VISIT (OUTPATIENT)
Dept: FAMILY MEDICINE CLINIC | Facility: CLINIC | Age: 30
End: 2020-03-17

## 2020-03-17 VITALS
SYSTOLIC BLOOD PRESSURE: 110 MMHG | WEIGHT: 141 LBS | HEART RATE: 106 BPM | OXYGEN SATURATION: 94 % | DIASTOLIC BLOOD PRESSURE: 80 MMHG | HEIGHT: 62 IN | BODY MASS INDEX: 25.95 KG/M2 | TEMPERATURE: 98.1 F

## 2020-03-17 DIAGNOSIS — R11.0 NAUSEA: ICD-10-CM

## 2020-03-17 DIAGNOSIS — R10.84 GENERALIZED ABDOMINAL PAIN: ICD-10-CM

## 2020-03-17 DIAGNOSIS — K59.04 FUNCTIONAL CONSTIPATION: ICD-10-CM

## 2020-03-17 DIAGNOSIS — R10.84 GENERALIZED ABDOMINAL PAIN: Primary | ICD-10-CM

## 2020-03-17 PROCEDURE — 83690 ASSAY OF LIPASE: CPT | Performed by: NURSE PRACTITIONER

## 2020-03-17 PROCEDURE — 99214 OFFICE O/P EST MOD 30 MIN: CPT | Performed by: NURSE PRACTITIONER

## 2020-03-17 PROCEDURE — 80053 COMPREHEN METABOLIC PANEL: CPT | Performed by: NURSE PRACTITIONER

## 2020-03-17 PROCEDURE — 82150 ASSAY OF AMYLASE: CPT | Performed by: NURSE PRACTITIONER

## 2020-03-17 PROCEDURE — 85025 COMPLETE CBC W/AUTO DIFF WBC: CPT | Performed by: NURSE PRACTITIONER

## 2020-03-17 PROCEDURE — 36415 COLL VENOUS BLD VENIPUNCTURE: CPT | Performed by: NURSE PRACTITIONER

## 2020-03-17 PROCEDURE — 81001 URINALYSIS AUTO W/SCOPE: CPT

## 2020-03-17 NOTE — ASSESSMENT & PLAN NOTE
I have reviewed recent GI consult at [facility] as well as procedural notes/pathology for EGD.  Patient is requesting to see a different GI provider, referral has been placed.  I have encouraged patient to sip on fluids and boost/Ensure.  Encouraged liquid diet such as soups/Jell-O/puddings in very small amounts spread throughout the day.  Monitor for hydration and decreased urination.  Requested UA today.  We will also obtain amylase and lipase.  CBC and CMP today.

## 2020-03-17 NOTE — PROGRESS NOTES
Subjective   Elisabeth Hall is a 30 y.o. female.     No chief complaint on file.  Chief complaint  Still having abdominal pain, nausea and difficulty eating    History of Present Illness:    Patient has been saying several times over the past month or so with abdominal pain, nausea and difficulty eating.  She did consult to GI recently.  She was seen on 2/27/2020 by Dr. Abad in [facility] for an EGD.  Procedural note reviewed today and a biopsy results showed some superficial gastric atrial and fundic mucosa with no significant histopathologic changes.  Negative H. pylori.  Negative for  intestinal metaplasia, dysplasia or malignancy. Superficial duodenal mucosa with normal villous architecture and no significant histopathologic change.   She was started on Dexilant temporarily then at her follow-up visit this was stopped and she was started on Protonix 40 mg twice daily.  Patient reports that as long she does not eat she does not have heartburn.  She is unable to eat more than 1 or 2 bites of anything solid before the nausea, gagging and abdominal pain starts.  She is drinking a couple of ensures a day.  Drinking also causes abdominal pain and she feels as if she has a knot that rises on her left upper abdomen shortly after eating or drinking, then reduce this within about an hour.  She has not kept any substantial solid foods down for several weeks.  She does show weight loss.    Patient was seen at the [facility] emergency department 2/10/2020.  A CT of the abdomen and pelvis with contrast was performed.  Impression reviewed as no acute abdomen or pelvis abnormality.  Appendix appears normal.  Gallbladder surgically absent.  Bilateral nephrolithiasis without urolithiasis or hydroureteronephrosis.  Metric renal enhancement and excretion.  Stable, normal CT appearance of the pancreas.  No evidence for acute pancreatitis.    Patient does have a history of constipation.  She recently went 3 weeks  "without a bowel movement.  GI gave her a very low dose of Linzess which produced no cramping and only a small amount of stool when she initially started the pill.  She has not had a bowel movement in almost 2 weeks at this point.  She does hear her stomach growling and is passing gas.    The following portions of the patient's history and ROS were reviewed and updated as appropriate per provider:  Allergies, current medications, past family history, past medical history, past social history, past surgical history and problem list.    Review of Systems   Constitutional: Positive for activity change, appetite change, fatigue and unexpected weight change. Negative for chills, diaphoresis and fever.   HENT: Negative for congestion, sinus pressure, sinus pain, sore throat and trouble swallowing.    Eyes: Negative.    Respiratory: Negative for cough, chest tightness, shortness of breath and wheezing.    Cardiovascular: Negative for chest pain, palpitations and leg swelling.   Gastrointestinal: Positive for abdominal pain, constipation, nausea and vomiting (more nausea, she is gaging up \"acid\"). Negative for blood in stool and diarrhea.   Endocrine: Negative.    Genitourinary: Negative for decreased urine volume, difficulty urinating, dysuria, frequency and urgency.   Musculoskeletal: Positive for arthralgias, back pain and neck pain. Negative for joint swelling and neck stiffness.   Skin: Negative.    Allergic/Immunologic: Negative.    Neurological: Positive for weakness (Overall). Negative for tremors, facial asymmetry, speech difficulty and light-headedness.   Psychiatric/Behavioral: Negative for dysphoric mood, self-injury and suicidal ideas.       Objective     /80   Pulse 106   Temp 98.1 °F (36.7 °C) (Temporal)   Ht 157.5 cm (62.01\")   Wt 64 kg (141 lb)   LMP 02/17/2020   SpO2 94%   BMI 25.78 kg/m²   Lab on 10/25/2019   Component Date Value Ref Range Status   • Glucose 10/25/2019 84  65 - 99 mg/dL Final  "   • BUN 10/25/2019 11  6 - 20 mg/dL Final   • Creatinine 10/25/2019 0.72  0.57 - 1.00 mg/dL Final   • Sodium 10/25/2019 140  136 - 145 mmol/L Final   • Potassium 10/25/2019 4.3  3.5 - 5.2 mmol/L Final   • Chloride 10/25/2019 104  98 - 107 mmol/L Final   • CO2 10/25/2019 24.6  22.0 - 29.0 mmol/L Final   • Calcium 10/25/2019 9.3  8.6 - 10.5 mg/dL Final   • Total Protein 10/25/2019 8.0  6.0 - 8.5 g/dL Final   • Albumin 10/25/2019 4.40  3.50 - 5.20 g/dL Final   • ALT (SGPT) 10/25/2019 21  1 - 33 U/L Final   • AST (SGOT) 10/25/2019 14  1 - 32 U/L Final   • Alkaline Phosphatase 10/25/2019 80  39 - 117 U/L Final   • Total Bilirubin 10/25/2019 0.2  0.2 - 1.2 mg/dL Final   • eGFR Non African Amer 10/25/2019 95  >60 mL/min/1.73 Final   • Globulin 10/25/2019 3.6  gm/dL Final   • A/G Ratio 10/25/2019 1.2  g/dL Final   • BUN/Creatinine Ratio 10/25/2019 15.3  7.0 - 25.0 Final   • Anion Gap 10/25/2019 11.4  5.0 - 15.0 mmol/L Final   • TSH 10/25/2019 1.030  0.270 - 4.200 uIU/mL Final   • Vitamin B-12 10/25/2019 447  211 - 946 pg/mL Final   • 25 Hydroxy, Vitamin D 10/25/2019 15.7* 30.0 - 100.0 ng/ml Final   • Total Cholesterol 10/25/2019 175  0 - 200 mg/dL Final   • Triglycerides 10/25/2019 94  0 - 150 mg/dL Final   • HDL Cholesterol 10/25/2019 48  40 - 60 mg/dL Final   • LDL Cholesterol  10/25/2019 108* 0 - 100 mg/dL Final   • VLDL Cholesterol 10/25/2019 18.8  5 - 40 mg/dL Final   • LDL/HDL Ratio 10/25/2019 2.25   Final   • Microalbumin, Urine 10/25/2019 <1.2  mg/dL Final   • WBC 10/25/2019 10.43  3.40 - 10.80 10*3/mm3 Final   • RBC 10/25/2019 4.39  3.77 - 5.28 10*6/mm3 Final   • Hemoglobin 10/25/2019 13.8  12.0 - 15.9 g/dL Final   • Hematocrit 10/25/2019 41.2  34.0 - 46.6 % Final   • MCV 10/25/2019 93.8  79.0 - 97.0 fL Final   • MCH 10/25/2019 31.4  26.6 - 33.0 pg Final   • MCHC 10/25/2019 33.5  31.5 - 35.7 g/dL Final   • RDW 10/25/2019 12.2* 12.3 - 15.4 % Final   • RDW-SD 10/25/2019 41.8  37.0 - 54.0 fl Final   • MPV 10/25/2019  9.5  6.0 - 12.0 fL Final   • Platelets 10/25/2019 350  140 - 450 10*3/mm3 Final   • Neutrophil % 10/25/2019 62.6  42.7 - 76.0 % Final   • Lymphocyte % 10/25/2019 26.7  19.6 - 45.3 % Final   • Monocyte % 10/25/2019 7.3  5.0 - 12.0 % Final   • Eosinophil % 10/25/2019 2.3  0.3 - 6.2 % Final   • Basophil % 10/25/2019 0.7  0.0 - 1.5 % Final   • Immature Grans % 10/25/2019 0.4  0.0 - 0.5 % Final   • Neutrophils, Absolute 10/25/2019 6.53  1.70 - 7.00 10*3/mm3 Final   • Lymphocytes, Absolute 10/25/2019 2.79  0.70 - 3.10 10*3/mm3 Final   • Monocytes, Absolute 10/25/2019 0.76  0.10 - 0.90 10*3/mm3 Final   • Eosinophils, Absolute 10/25/2019 0.24  0.00 - 0.40 10*3/mm3 Final   • Basophils, Absolute 10/25/2019 0.07  0.00 - 0.20 10*3/mm3 Final   • Immature Grans, Absolute 10/25/2019 0.04  0.00 - 0.05 10*3/mm3 Final   • nRBC 10/25/2019 0.0  0.0 - 0.2 /100 WBC Final       Physical Exam   Constitutional: She is oriented to person, place, and time. Vital signs are normal. She appears well-developed and well-nourished. No distress.   HENT:   Head: Normocephalic.   Right Ear: External ear normal.   Left Ear: External ear normal.   Nose: Nose normal.   Mouth/Throat: Oropharynx is clear and moist. No oropharyngeal exudate.   Eyes: Pupils are equal, round, and reactive to light. Conjunctivae, EOM and lids are normal. Right eye exhibits no discharge. Left eye exhibits no discharge.   Neck: Normal range of motion. Neck supple. No tracheal deviation present. No thyromegaly present.   Cardiovascular: Normal rate, regular rhythm and normal heart sounds. Exam reveals no gallop and no friction rub.   No murmur heard.  Pulmonary/Chest: Effort normal and breath sounds normal. No respiratory distress. She has no wheezes. She has no rales. She exhibits no tenderness.   Abdominal: Soft. Normal appearance. She exhibits no distension and no mass. Bowel sounds are increased. There is no hepatosplenomegaly. There is generalized tenderness and tenderness  in the epigastric area and left upper quadrant. There is no rigidity, no rebound and no guarding.   Musculoskeletal: Normal range of motion.   Lymphadenopathy:     She has no cervical adenopathy.   Neurological: She is alert and oriented to person, place, and time. She has normal reflexes.   CN 2-12 grossly intact    Skin: Skin is warm and dry. Capillary refill takes less than 2 seconds. No rash noted. She is not diaphoretic. No erythema.   Psychiatric: She has a normal mood and affect. Her speech is normal and behavior is normal. Judgment and thought content normal. Cognition and memory are normal.   Vitals reviewed.      Assessment/Plan     Problem List Items Addressed This Visit        Digestive    Constipation - functional    Relevant Medications    linaclotide (LINZESS) 290 MCG capsule capsule    Nausea    Relevant Orders    Ambulatory Referral to Gastroenterology    Amylase    Lipase    Urinalysis With Culture If Indicated -       Nervous and Auditory    Generalized abdominal pain - Primary    Current Assessment & Plan     I have reviewed recent GI consult at [facility] as well as procedural notes/pathology for EGD.  Patient is requesting to see a different GI provider, referral has been placed.  I have encouraged patient to sip on fluids and boost/Ensure.  Encouraged liquid diet such as soups/Jell-O/puddings in very small amounts spread throughout the day.  Monitor for hydration and decreased urination.  Requested UA today.  We will also obtain amylase and lipase.  CBC and CMP today.         Relevant Orders    CBC & Differential    Comprehensive Metabolic Panel    Ambulatory Referral to Gastroenterology    Amylase    Lipase    Urinalysis With Culture If Indicated -    CBC Auto Differential                 Patient's Body mass index is 25.78 kg/m². BMI is above normal parameters. Recommendations include: nutrition counseling.  Unintentional weight loss greater than 10 pounds over the last 6 months.  Under  the care of GI.    Elisabeth Hall  reports that she has been smoking cigarettes. She has been smoking about 0.50 packs per day. She has never used smokeless tobacco.. I have educated her on the risk of diseases from using tobacco products such as cancer, COPD and heart diease.     I advised her to quit and she is not willing to quit.    I spent 1 minutes counseling the patient.         I have discussed diagnosis in detail today allowing time for questions and answers. Patient is aware of reasons to seek urgent or emergent medical care as well as reasons to return to the clinic for evaluation. Possible side effects, interactions and progression of symptoms discussed as well. Patient / family states understanding.   Emotional support and active listening provided.     Discussed reasons to seek immediate medical attention.  Will notify patient when lab results return.  I have reviewed coronavirus precautions and need for social distancing, handwashing and infection control.  I would like to see her back in 1-2 weeks, sooner if needed.  Patient states understanding.          This document has been electronically signed by:  RIANA Tellez, NP-C

## 2020-03-18 ENCOUNTER — TELEPHONE (OUTPATIENT)
Dept: FAMILY MEDICINE CLINIC | Facility: CLINIC | Age: 30
End: 2020-03-18

## 2020-03-18 LAB
ALBUMIN SERPL-MCNC: 4.8 G/DL (ref 3.5–5.2)
ALBUMIN/GLOB SERPL: 1.8 G/DL
ALP SERPL-CCNC: 76 U/L (ref 39–117)
ALT SERPL W P-5'-P-CCNC: 17 U/L (ref 1–33)
AMYLASE SERPL-CCNC: 44 U/L (ref 28–100)
ANION GAP SERPL CALCULATED.3IONS-SCNC: 14.3 MMOL/L (ref 5–15)
AST SERPL-CCNC: 16 U/L (ref 1–32)
BACTERIA UR QL AUTO: ABNORMAL /HPF
BASOPHILS # BLD AUTO: 0.06 10*3/MM3 (ref 0–0.2)
BASOPHILS NFR BLD AUTO: 0.6 % (ref 0–1.5)
BILIRUB SERPL-MCNC: 0.2 MG/DL (ref 0.2–1.2)
BILIRUB UR QL STRIP: NEGATIVE
BUN BLD-MCNC: 12 MG/DL (ref 6–20)
BUN/CREAT SERPL: 16.2 (ref 7–25)
CALCIUM SPEC-SCNC: 9.8 MG/DL (ref 8.6–10.5)
CHLORIDE SERPL-SCNC: 100 MMOL/L (ref 98–107)
CLARITY UR: ABNORMAL
CO2 SERPL-SCNC: 24.7 MMOL/L (ref 22–29)
COLOR UR: YELLOW
CREAT BLD-MCNC: 0.74 MG/DL (ref 0.57–1)
DEPRECATED RDW RBC AUTO: 43.1 FL (ref 37–54)
EOSINOPHIL # BLD AUTO: 0.32 10*3/MM3 (ref 0–0.4)
EOSINOPHIL NFR BLD AUTO: 3 % (ref 0.3–6.2)
ERYTHROCYTE [DISTWIDTH] IN BLOOD BY AUTOMATED COUNT: 13 % (ref 12.3–15.4)
GFR SERPL CREATININE-BSD FRML MDRD: 92 ML/MIN/1.73
GLOBULIN UR ELPH-MCNC: 2.7 GM/DL
GLUCOSE BLD-MCNC: 74 MG/DL (ref 65–99)
GLUCOSE UR STRIP-MCNC: NEGATIVE MG/DL
HCT VFR BLD AUTO: 42.2 % (ref 34–46.6)
HGB BLD-MCNC: 14 G/DL (ref 12–15.9)
HGB UR QL STRIP.AUTO: ABNORMAL
HYALINE CASTS UR QL AUTO: ABNORMAL /LPF
IMM GRANULOCYTES # BLD AUTO: 0.04 10*3/MM3 (ref 0–0.05)
IMM GRANULOCYTES NFR BLD AUTO: 0.4 % (ref 0–0.5)
KETONES UR QL STRIP: ABNORMAL
LEUKOCYTE ESTERASE UR QL STRIP.AUTO: ABNORMAL
LIPASE SERPL-CCNC: 31 U/L (ref 13–60)
LYMPHOCYTES # BLD AUTO: 2.71 10*3/MM3 (ref 0.7–3.1)
LYMPHOCYTES NFR BLD AUTO: 25.8 % (ref 19.6–45.3)
MCH RBC QN AUTO: 30.4 PG (ref 26.6–33)
MCHC RBC AUTO-ENTMCNC: 33.2 G/DL (ref 31.5–35.7)
MCV RBC AUTO: 91.7 FL (ref 79–97)
MONOCYTES # BLD AUTO: 0.65 10*3/MM3 (ref 0.1–0.9)
MONOCYTES NFR BLD AUTO: 6.2 % (ref 5–12)
NEUTROPHILS # BLD AUTO: 6.72 10*3/MM3 (ref 1.7–7)
NEUTROPHILS NFR BLD AUTO: 64 % (ref 42.7–76)
NITRITE UR QL STRIP: NEGATIVE
NRBC BLD AUTO-RTO: 0 /100 WBC (ref 0–0.2)
PH UR STRIP.AUTO: 6 [PH] (ref 5–8)
PLATELET # BLD AUTO: 380 10*3/MM3 (ref 140–450)
PMV BLD AUTO: 10.2 FL (ref 6–12)
POTASSIUM BLD-SCNC: 4 MMOL/L (ref 3.5–5.2)
PROT SERPL-MCNC: 7.5 G/DL (ref 6–8.5)
PROT UR QL STRIP: ABNORMAL
RBC # BLD AUTO: 4.6 10*6/MM3 (ref 3.77–5.28)
RBC # UR: ABNORMAL /HPF
REF LAB TEST METHOD: ABNORMAL
SODIUM BLD-SCNC: 139 MMOL/L (ref 136–145)
SP GR UR STRIP: 1.02 (ref 1–1.03)
SQUAMOUS #/AREA URNS HPF: ABNORMAL /HPF
UROBILINOGEN UR QL STRIP: ABNORMAL
WBC NRBC COR # BLD: 10.5 10*3/MM3 (ref 3.4–10.8)
WBC UR QL AUTO: ABNORMAL /HPF

## 2020-03-18 NOTE — TELEPHONE ENCOUNTER
That does explain the blood in her UA.  Please let her know I want her to work on getting a little bit more food in even if it is Jell-O, pudding, soup.  I would like for her to work on hydration as well even if is just one sip at a time.

## 2020-03-18 NOTE — TELEPHONE ENCOUNTER
----- Message from RIANA Samuel sent at 3/18/2020  7:52 AM EDT -----  Was she on her menstrual period ?      Yes she is on her period

## 2020-03-18 NOTE — TELEPHONE ENCOUNTER
----- Message from RIANA Samuel sent at 3/18/2020 10:17 AM EDT -----  Please let Socorro know that her metabolic panel, liver function test and pancreatic enzymes are all normal.      Spoke with socorro and told her results

## 2020-03-24 DIAGNOSIS — M54.40 BILATERAL LOW BACK PAIN WITH SCIATICA, SCIATICA LATERALITY UNSPECIFIED, UNSPECIFIED CHRONICITY: ICD-10-CM

## 2020-03-25 RX ORDER — CYCLOBENZAPRINE HCL 5 MG
TABLET ORAL
Qty: 90 TABLET | Refills: 1 | Status: SHIPPED | OUTPATIENT
Start: 2020-03-25 | End: 2020-05-27

## 2020-03-31 ENCOUNTER — TELEMEDICINE (OUTPATIENT)
Dept: FAMILY MEDICINE CLINIC | Facility: CLINIC | Age: 30
End: 2020-03-31

## 2020-03-31 DIAGNOSIS — K58.1 IRRITABLE BOWEL SYNDROME WITH CONSTIPATION: ICD-10-CM

## 2020-03-31 DIAGNOSIS — R10.84 GENERALIZED ABDOMINAL PAIN: Primary | ICD-10-CM

## 2020-03-31 PROCEDURE — 99213 OFFICE O/P EST LOW 20 MIN: CPT | Performed by: NURSE PRACTITIONER

## 2020-03-31 RX ORDER — DOXYCYCLINE HYCLATE 100 MG/1
100 CAPSULE ORAL 2 TIMES DAILY
Qty: 20 CAPSULE | Refills: 0 | Status: SHIPPED | OUTPATIENT
Start: 2020-03-31 | End: 2020-04-10 | Stop reason: SDUPTHER

## 2020-03-31 NOTE — ASSESSMENT & PLAN NOTE
Differential includes reflux/diverticulitis versus diverticulosis/IBS among multiple other disease processes.  I have encouraged her to call her GI provider today and see if she can be worked in sooner.  She is to go straight to the emergency room with any further GI bleed.  I am going go ahead and start her on some doxycycline as this may be helpful if she is in the middle of a diverticulitis episode.  I have discussed with patient that she will likely need a colonoscopy and she is agreeable to this testing if needed.

## 2020-03-31 NOTE — PROGRESS NOTES
Subjective   Elisabeth Hall is a 30 y.o. female.     Chief Complaint   Patient presents with   • Abdominal Pain   • loss of appetite   Video visit via my chart performed today due to coronavirus precautions  Chief complaint  Abdominal pain and loss of appetite      History of Present Illness:    Patient continues to have abdominal pain that is worse after eating.  She reports that her pain starts in her mid stomach and radiates into the left lower abdomen.  This is triggered by eating.  She is unable to eat solid foods.  She is scheduled for a GI consult next week.  Patient did recently have an EGD which results were reviewed with her at her last office visit.  She did not have a colonoscopy performed.  She was seen at the emergency department at [facility] on 2/10/2020 where a CT of the abdomen and pelvis with contrast was performed.  No acute abnormalities were noted.  The appendix appeared normal.  She did have some recent blood work showing some protein and blood in her urine.  She has increased the amount of boost she is drinking and trying to get in some protein into her diet.  She is trying to drink at least 3 boost and supplement with some Gatorade in between to stay hydrated.  She is voiding well.  Her H. pylori was negative.      The following portions of the patient's history and ROS were reviewed and updated as appropriate per provider:  Allergies, current medications, past family history, past medical history, past social history, past surgical history and problem list.    Review of Systems   Constitutional: Positive for activity change, appetite change, chills and fever.   HENT: Negative for congestion, sinus pressure, sinus pain and sore throat.    Eyes: Negative.    Respiratory: Negative for cough, chest tightness, shortness of breath and wheezing.    Gastrointestinal: Positive for abdominal pain, blood in stool and constipation.   Endocrine: Negative.    Musculoskeletal: Positive for  arthralgias and back pain.   Allergic/Immunologic: Negative for environmental allergies.   Psychiatric/Behavioral: Negative for dysphoric mood, self-injury and suicidal ideas.       Objective     There were no vitals taken for this visit.  Orders Only on 03/17/2020   Component Date Value Ref Range Status   • Color, UA 03/17/2020 Yellow  Yellow, Straw Final   • Appearance, UA 03/17/2020 Cloudy* Clear Final   • pH, UA 03/17/2020 6.0  5.0 - 8.0 Final   • Specific Gravity, UA 03/17/2020 1.021  1.005 - 1.030 Final   • Glucose, UA 03/17/2020 Negative  Negative Final   • Ketones, UA 03/17/2020 Trace* Negative Final   • Bilirubin, UA 03/17/2020 Negative  Negative Final   • Blood, UA 03/17/2020 Large (3+)* Negative Final   • Protein, UA 03/17/2020 Trace* Negative Final   • Leuk Esterase, UA 03/17/2020 Trace* Negative Final   • Nitrite, UA 03/17/2020 Negative  Negative Final   • Urobilinogen, UA 03/17/2020 0.2 E.U./dL  0.2 - 1.0 E.U./dL Final   • RBC, UA 03/17/2020 21-30* None Seen, 0-2 /HPF Final   • WBC, UA 03/17/2020 0-2  None Seen, 0-2 /HPF Final   • Bacteria, UA 03/17/2020 None Seen  None Seen /HPF Final   • Squamous Epithelial Cells, UA 03/17/2020 3-6* None Seen, 0-2 /HPF Final   • Hyaline Casts, UA 03/17/2020 0-2  None Seen /LPF Final   • Methodology 03/17/2020 Manual Light Microscopy   Final       Physical Exam   Constitutional: She is oriented to person, place, and time. She appears well-developed.   Pulmonary/Chest: Effort normal.   Neurological: She is alert and oriented to person, place, and time.   Skin: No pallor.   Psychiatric: She has a normal mood and affect. Her behavior is normal. Thought content normal.       Assessment/Plan     Problem List Items Addressed This Visit        Digestive    IBS (irritable bowel syndrome)    Current Assessment & Plan     Patient scheduled next week with GI.  I have encouraged her to call today to see if she can get in earlier and report to her GI provider that she has had some  blood in her stool.  Patient reports that she had some bright red bleeding in her stool yesterday.  Her recent CMP and CBC were relatively normal.  Continue drinking boost and avoid irritating foods.            Nervous and Auditory    Generalized abdominal pain - Primary    Current Assessment & Plan     Differential includes reflux/diverticulitis versus diverticulosis/IBS among multiple other disease processes.  I have encouraged her to call her GI provider today and see if she can be worked in sooner.  She is to go straight to the emergency room with any further GI bleed.  I am going go ahead and start her on some doxycycline as this may be helpful if she is in the middle of a diverticulitis episode.  I have discussed with patient that she will likely need a colonoscopy and she is agreeable to this testing if needed.         Relevant Medications    doxycycline (VIBRAMYCIN) 100 MG capsule        Recent lab results reviewed and discussed  Continue boost at least 3 times a day.  Continue drinking Gatorade at least 2 bottles a day and try to get in at least 2 additional bottles of water if tolerated.  Soft foods only as tolerated.    Lab Results   Component Value Date    GLUCOSE 74 03/17/2020    BUN 12 03/17/2020    CREATININE 0.74 03/17/2020    EGFRIFNONA 92 03/17/2020    BCR 16.2 03/17/2020    K 4.0 03/17/2020    CO2 24.7 03/17/2020    CALCIUM 9.8 03/17/2020    ALBUMIN 4.80 03/17/2020    LABIL2 1.7 11/03/2015    AST 16 03/17/2020    ALT 17 03/17/2020     Lab Results   Component Value Date    WBC 10.50 03/17/2020    HGB 14.0 03/17/2020    HCT 42.2 03/17/2020    MCV 91.7 03/17/2020     03/17/2020              MyChart video visit performed with audio and visual.    Elisabeth LUGO Hall  reports that she has been smoking cigarettes. She has a 15.00 pack-year smoking history. She has never used smokeless tobacco.. I have educated her on the risk of diseases from using tobacco products such as cancer, COPD and heart  diease.     I advised her to quit and she is not willing to quit.    I spent 1 minutes counseling the patient.       I have discussed diagnosis in detail today allowing time for questions and answers. Patient is aware of reasons to seek urgent or emergent medical care as well as reasons to return to the clinic for evaluation. Possible side effects, interactions and progression of symptoms discussed as well. Patient / family states understanding.   Emotional support and active listening provided.     Coronavirus precautions have been reviewed and discussed.  I have discussed the CDC recommendations  of social distancing, hand washing and disinfecting commonly touched items. Reviewed need to notify PCP and self quarantine with mild symptoms.  Discussed procedure to obtain Covid-19 testing and notification of PCP/health dept/ED/Urgent Center if symptoms begin. Understanding verbalized.    I would like to see her back/video visit in 1 week, sooner if needed.          This document has been electronically signed by:  RIANA Tellez, NP-C

## 2020-04-02 ENCOUNTER — TELEPHONE (OUTPATIENT)
Dept: GASTROENTEROLOGY | Facility: CLINIC | Age: 30
End: 2020-04-02

## 2020-04-02 ENCOUNTER — TELEMEDICINE (OUTPATIENT)
Dept: GASTROENTEROLOGY | Facility: CLINIC | Age: 30
End: 2020-04-02

## 2020-04-02 ENCOUNTER — TELEPHONE (OUTPATIENT)
Dept: FAMILY MEDICINE CLINIC | Facility: CLINIC | Age: 30
End: 2020-04-02

## 2020-04-02 ENCOUNTER — OFFICE VISIT (OUTPATIENT)
Dept: FAMILY MEDICINE CLINIC | Facility: CLINIC | Age: 30
End: 2020-04-02

## 2020-04-02 VITALS — BODY MASS INDEX: 25.4 KG/M2 | WEIGHT: 138 LBS | HEIGHT: 62 IN

## 2020-04-02 DIAGNOSIS — R10.32 LEFT LOWER QUADRANT ABDOMINAL PAIN: ICD-10-CM

## 2020-04-02 DIAGNOSIS — K58.1 IRRITABLE BOWEL SYNDROME WITH CONSTIPATION: ICD-10-CM

## 2020-04-02 DIAGNOSIS — R10.13 EPIGASTRIC PAIN: ICD-10-CM

## 2020-04-02 DIAGNOSIS — K62.5 RECTAL BLEEDING: ICD-10-CM

## 2020-04-02 DIAGNOSIS — R10.12 LUQ ABDOMINAL PAIN: Primary | ICD-10-CM

## 2020-04-02 DIAGNOSIS — H10.32 ACUTE BACTERIAL CONJUNCTIVITIS OF LEFT EYE: Primary | ICD-10-CM

## 2020-04-02 DIAGNOSIS — R11.0 NAUSEA: ICD-10-CM

## 2020-04-02 DIAGNOSIS — K21.9 GASTROESOPHAGEAL REFLUX DISEASE, ESOPHAGITIS PRESENCE NOT SPECIFIED: ICD-10-CM

## 2020-04-02 DIAGNOSIS — R63.4 WEIGHT LOSS, ABNORMAL: ICD-10-CM

## 2020-04-02 DIAGNOSIS — I10 ESSENTIAL HYPERTENSION: ICD-10-CM

## 2020-04-02 PROBLEM — J35.8 OTHER CHRONIC DISEASE OF TONSILS AND ADENOIDS: Status: ACTIVE | Noted: 2020-04-02

## 2020-04-02 PROBLEM — E78.00 HYPERCHOLESTEROLEMIA: Status: ACTIVE | Noted: 2020-04-02

## 2020-04-02 PROCEDURE — 99213 OFFICE O/P EST LOW 20 MIN: CPT | Performed by: PHYSICIAN ASSISTANT

## 2020-04-02 PROCEDURE — 99243 OFF/OP CNSLTJ NEW/EST LOW 30: CPT | Performed by: PHYSICIAN ASSISTANT

## 2020-04-02 RX ORDER — METOCLOPRAMIDE 10 MG/1
10 TABLET ORAL
Qty: 120 TABLET | Refills: 0 | Status: SHIPPED | OUTPATIENT
Start: 2020-04-02 | End: 2020-04-16

## 2020-04-02 RX ORDER — METOPROLOL SUCCINATE 25 MG/1
25 TABLET, EXTENDED RELEASE ORAL DAILY
Qty: 30 TABLET | Refills: 5 | Status: SHIPPED | OUTPATIENT
Start: 2020-04-02 | End: 2020-07-21 | Stop reason: SDUPTHER

## 2020-04-02 RX ORDER — ERYTHROMYCIN 5 MG/G
OINTMENT OPHTHALMIC NIGHTLY
Qty: 3.5 G | Refills: 0 | Status: SHIPPED | OUTPATIENT
Start: 2020-04-02 | End: 2020-04-16 | Stop reason: ALTCHOICE

## 2020-04-02 NOTE — PATIENT INSTRUCTIONS
Continue current medications. Complete antibiotic therapy. Increase Linzess dose to 290 mcg once daily (take 2 caps 145 mcg) for treatment of constipation. We will send more samples in the mail. Also, I will request previous records including images to review.

## 2020-04-02 NOTE — PROGRESS NOTES
: 1990    Chief Complaint   Patient presents with   • Abdominal Pain       Elisabeth Hall is a 30 y.o. female who presents to video visit today as a consultation from RIANA Samuel for evaluation of Abdominal Pain.    History of Present Illness:  Since the beginning of 2020, patient has been having GI symptoms that have been progressively worsening.  She states that her symptoms started out as nausea after eating but have now progressed to epigastric pain within 5 to 10 minutes after eating which radiates over to the left upper quadrant and becomes debilitating in nature.  She has now discovered that eating solid food causes severe pain.  She has been drinking boost shakes and no solid foods within the past 4 weeks.  It seems that drinking liquids is her only relief from the pain.  She tried a puréed potato soup and even had symptoms with it.  She has been drinking water only, chicken broth but no soda or coffee.  She stopped drinking coffee at the time symptoms started.  Severe abdominal pain usually resolves within an hour.  She has lost 20 pounds and symptoms started.  Denies any vomiting, nausea is intermittent.  She is not taking any antiemetics currently.    She did have an EGD completed at [facility] which showed mild gastritis, negative H. pylori and esophagitis in 2020.  Her last colonoscopy was approximately , told she had IBS at that time.    Does report heartburn worse when eating solid foods and becomes very severe.  She awakens in the morning sometimes with a sore throat.  She is elevating the head of her bed due to GERD symptoms.  She will regurgitate acid if she lays flat.  Currently, she is taking Protonix 40 mg twice daily.  She has taken Prilosec, Prevacid, Dexilant in the past without relief.  Also tried taking Carafate tablets as prescribed by the ER, before meals and before bed, took for 3 days but did not seem to help. Went to the ER at  that time due to severe left sided chest pain. Was eventually given a GI cocktail but could not tell if it helped because her pain had already improved at that point.    Her bowel movements are described as usually constipated.  She has about 1 bowel movement per week usually but since she started taking the Linzess 145 mcg once daily prescribed by her PCP, stools have become somewhat more frequent, occurring about every 2 or 3 days.  Her last bowel movement had mucus and blood.  She does not have medication insurance.    Was given doxycycline Rx by PCP for her current GI complaints, started yesterday.     Review of Systems   Constitutional: Positive for appetite change and unexpected weight change. Negative for fever.   HENT: Negative for trouble swallowing.    Eyes: Negative.    Respiratory: Negative.    Cardiovascular: Positive for chest pain.   Gastrointestinal: Positive for abdominal pain, blood in stool, constipation and nausea. Negative for vomiting.   Endocrine: Negative.    Genitourinary: Negative.    Musculoskeletal: Negative.    Skin: Negative.    Allergic/Immunologic: Negative.    Neurological: Negative.    Hematological: Negative.    Psychiatric/Behavioral: Negative.        Past Medical History:   Diagnosis Date   • Anemia 2017   • Arthritis 2009   • Constipation    • Esophagitis, reflux    • Fatty liver 2/10/2020    [facility]   • GERD (gastroesophageal reflux disease) 2009   • Hypertension    • Irritable bowel    • Lumbago    • Memory loss    • Restless legs        Past Surgical History:   Procedure Laterality Date   • ANKLE SURGERY     • CHOLECYSTECTOMY     • COLONOSCOPY  2011   • TUBAL ABDOMINAL LIGATION     • UPPER GASTROINTESTINAL ENDOSCOPY  02/27/2020    [facility]       Family History   Problem Relation Age of Onset   • Diabetes Mother    • Hypertension Mother    • Arthritis Father    • Diabetes Father    • Birth defects Sister    • Kidney disease Sister    • Asthma Brother    •  Diabetes Brother    • Hypertension Brother    • Thyroid disease Brother    • COPD Maternal Uncle    • Cancer Maternal Uncle         Bladder   • Colon cancer Maternal Uncle    • Arthritis Maternal Grandmother    • Cancer Maternal Grandmother         Kidney and liver   • Diabetes Maternal Grandmother    • Hypertension Maternal Grandmother    • Liver disease Maternal Grandmother    • Cirrhosis Maternal Grandmother    • Liver cancer Maternal Grandmother    • Breast cancer Maternal Aunt    • Cancer Maternal Uncle         Lung and bone   • Cancer Paternal Grandmother         Colon   • Colon cancer Paternal Grandmother    • Cirrhosis Maternal Grandfather        Social History     Socioeconomic History   • Marital status:      Spouse name: betty   • Number of children: 1   • Years of education: 12   • Highest education level: Not on file   Occupational History   • Occupation: rent a [employer]   Social Needs   • Financial resource strain: Not hard at all   • Food insecurity:     Worry: Never true     Inability: Never true   • Transportation needs:     Medical: No     Non-medical: No   Tobacco Use   • Smoking status: Current Every Day Smoker     Packs/day: 1.00     Years: 15.00     Pack years: 15.00     Types: Cigarettes   • Smokeless tobacco: Never Used   Substance and Sexual Activity   • Alcohol use: No   • Drug use: No   • Sexual activity: Yes     Partners: Male     Birth control/protection: Surgical   Lifestyle   • Physical activity:     Days per week: 7 days     Minutes per session: 30 min   • Stress: Not at all       Current Outpatient Medications:   •  cyclobenzaprine (FLEXERIL) 5 MG tablet, Take 1 tablet by mouth three times daily as needed for muscle spasm, Disp: 90 tablet, Rfl: 1  •  doxycycline (VIBRAMYCIN) 100 MG capsule, Take 1 capsule by mouth 2 (Two) Times a Day for 10 days., Disp: 20 capsule, Rfl: 0  •  Fluticasone Furoate-Vilanterol (Breo Ellipta) 100-25 MCG/INH inhaler, Inhale 1 puff Daily., Disp: ,  "Rfl:   •  metoprolol succinate XL (TOPROL-XL) 25 MG 24 hr tablet, Take 1 tablet by mouth Daily., Disp: 30 tablet, Rfl: 5  •  pantoprazole (PROTONIX) 40 MG EC tablet, Take 40 mg by mouth 2 (Two) Times a Day., Disp: , Rfl:   •  linaclotide (LINZESS) 145 MCG capsule capsule, Take 1 capsule by mouth Every Morning Before Breakfast., Disp: 30 capsule, Rfl: 5  •  vitamin D (ERGOCALCIFEROL) 1.25 MG (68788 UT) capsule capsule, Take 1 capsule by mouth 1 (One) Time Per Week., Disp: 5 capsule, Rfl: 4    Allergies:   Azithromycin    Vitals:  Ht 157.5 cm (62\")   Wt 62.6 kg (138 lb)   BMI 25.24 kg/m²   Asked for weight and height, not all vitals were taken- video encounter    Physical Exam   Constitutional: She is oriented to person, place, and time. She appears well-developed and well-nourished. No distress.   HENT:   Head: Normocephalic and atraumatic.   Eyes: EOM are normal. No scleral icterus.   Neck: Normal range of motion.   Pulmonary/Chest: Effort normal. No respiratory distress.   Neurological: She is alert and oriented to person, place, and time.   Psychiatric: She has a normal mood and affect. Her behavior is normal. Judgment and thought content normal.     Results Review:  Labs 3/17/2020: CBC normal, CMP normal, lipase normal, urinalysis unremarkable.    CT scan of the abdomen and pelvis with IV contrast 2/10/2020: No acute abdomen or pelvis abnormality.  Appendix normal.  Gallbladder absent.  Bilateral nephrolithiasis without ureterolithiasis or hydronephrosis.  Normal pancreas.    Assessment:  1. LUQ abdominal pain    2. Epigastric pain    3. Irritable bowel syndrome with constipation    4. Rectal bleeding    5. Weight loss, abnormal    6. Gastroesophageal reflux disease, esophagitis presence not specified      Plan:  We will request previous records from [facility] for review of EGD and pathology.  She also had labs completed there, we will request those as well.    We will request disc with CT images from [facility] in February 2020 to be sent to the radiology department that I can review.    We will mail her more samples of Linzess 290 mcg to take once daily for treatment of IBS with constipation.  She can double up on the 145 mcg samples that she has at home until she receives this package.    Differential diagnoses include gastric/duodenal ulcer, gastric outlet obstruction, gastroparesis, severe constipation, SIBO, celiac disease, small bowel pathology, etc.    New Medications Ordered This Visit   Medications   • linaclotide (LINZESS) 290 MCG capsule capsule     Sig: Take 1 capsule by mouth Every Morning Before Breakfast.     Dispense:  30 capsule     Refill:  5     This was an audio and video enabled telemedicine encounter.        Return in about 2 weeks (around 4/16/2020) for recheck abdominal pain.      Electronically signed 4/2/2020 11:43  Yue Castro PA-C, [facility]

## 2020-04-02 NOTE — TELEPHONE ENCOUNTER
----- Message from DEAN Carroll sent at 4/2/2020  9:17 AM EDT -----  The patient has sent me a message through [employer] about an acute issue with her eye and possible reaction to doxycycline.  Please put her on my schedule this morning for a telephone visit

## 2020-04-02 NOTE — TELEPHONE ENCOUNTER
Please mail pt 5 boxes mary [phone], she does not have insurance. Any way that she can use discount card to make it more affordable? Thanks.

## 2020-04-02 NOTE — PROGRESS NOTES
Subjective   Elisabeth Hall is a 30 y.o. female.     Telephone visit    You have chosen to receive care through a telephone visit. Do you consent to use a telephone visit for your medical care today? Yes      Chief Complaint -red itchy eye    History of Present Illness -      Red itchy eye-  She complains of a redness in the left medial eye with associated pruritus and mild irritation approximately 2 days ago.  Minimal relief with warm compresses.      Nausea-  She complains of left lower quadrant moderate crampy abdominal pain.  She started doxycycline with minimal relief approximately a week ago.  She reports significant nausea and decreased appetite that is worse when eating.  She states that she does drink at least 3 boost drinks per day but it takes her approximately 30 minutes to finish 1 boost drink.  Onset greater than 1 month.  She states she is having regular bowel movements approximately every other day with the use of Linzess.    Hypertension-stable with metoprolol      The following portions of the patient's history were reviewed and updated as appropriate: allergies, current medications, past family history, past medical history, past social history, past surgical history and problem list.    Review of Systems   Constitutional: Negative for activity change, appetite change, fatigue and fever.   HENT: Negative for ear pain, sinus pressure and sore throat.    Eyes: Positive for discharge, redness, itching and visual disturbance. Negative for pain.   Respiratory: Negative for cough and chest tightness.    Cardiovascular: Negative for chest pain and palpitations.   Gastrointestinal: Positive for abdominal pain and nausea. Negative for constipation, diarrhea and vomiting.   Endocrine: Negative for polydipsia and polyuria.   Genitourinary: Negative for dysuria and frequency.   Musculoskeletal: Negative for back pain and myalgias.   Skin: Negative for color change and rash.   Allergic/Immunologic: Negative  for food allergies and immunocompromised state.   Neurological: Negative for dizziness, syncope and headaches.   Hematological: Negative for adenopathy. Does not bruise/bleed easily.   Psychiatric/Behavioral: Negative for hallucinations and suicidal ideas. The patient is not nervous/anxious.        There were no vitals taken for this visit.  Orders Only on 03/17/2020   Component Date Value Ref Range Status   • Color, UA 03/17/2020 Yellow  Yellow, Straw Final   • Appearance, UA 03/17/2020 Cloudy* Clear Final   • pH, UA 03/17/2020 6.0  5.0 - 8.0 Final   • Specific Gravity, UA 03/17/2020 1.021  1.005 - 1.030 Final   • Glucose, UA 03/17/2020 Negative  Negative Final   • Ketones, UA 03/17/2020 Trace* Negative Final   • Bilirubin, UA 03/17/2020 Negative  Negative Final   • Blood, UA 03/17/2020 Large (3+)* Negative Final   • Protein, UA 03/17/2020 Trace* Negative Final   • Leuk Esterase, UA 03/17/2020 Trace* Negative Final   • Nitrite, UA 03/17/2020 Negative  Negative Final   • Urobilinogen, UA 03/17/2020 0.2 E.U./dL  0.2 - 1.0 E.U./dL Final   • RBC, UA 03/17/2020 21-30* None Seen, 0-2 /HPF Final   • WBC, UA 03/17/2020 0-2  None Seen, 0-2 /HPF Final   • Bacteria, UA 03/17/2020 None Seen  None Seen /HPF Final   • Squamous Epithelial Cells, UA 03/17/2020 3-6* None Seen, 0-2 /HPF Final   • Hyaline Casts, UA 03/17/2020 0-2  None Seen /LPF Final   • Methodology 03/17/2020 Manual Light Microscopy   Final       Physical Exam   Psychiatric: She has a normal mood and affect. Her behavior is normal. Judgment and thought content normal.       Assessment/Plan     Diagnoses and all orders for this visit:    Acute bacterial conjunctivitis of left eye  Comments:  Warm compresses advised  Orders:  -     erythromycin (ROMYCIN) 5 MG/GM ophthalmic ointment; Administer  into the left eye Every Night.    Nausea  Comments:  Start Reglan q. before meals and nightly  Patient counseled on possible side effects of Reglan  Orders:  -      metoclopramide (Reglan) 10 MG tablet; Take 1 tablet by mouth 4 (Four) Times a Day Before Meals & at Bedtime.    Essential hypertension  Comments:  Continue metoprolol  Orders:  -     metoprolol succinate XL (TOPROL-XL) 25 MG 24 hr tablet; Take 1 tablet by mouth Daily.    Left lower quadrant abdominal pain  Comments:  Continue doxycycline until gone  Patient was advised that if symptoms should persist or worsen that she should contact the office back for further evaluation an      Telephone visit- 13 minutes were spent on medical discussion with the patient with regards to her medication, acute eye issue, and abdominal issue and treatment options.    This visit has been rescheduled as a phone visit to comply with patient safety concerns in accordance with CDC recommendations. Total time of discussion was 13 minutes.        This document has been electronically signed by:  Chata Forman PA-C

## 2020-04-03 NOTE — PROGRESS NOTES
Patient has a follow up appointment for 2 weeks and I called and spoke with Chantel at [facility] and requested a disc of CT be sent attention [facility] (radiology dept.) for comparison of CT

## 2020-04-06 ENCOUNTER — LAB (OUTPATIENT)
Dept: LAB | Facility: HOSPITAL | Age: 30
End: 2020-04-06

## 2020-04-06 ENCOUNTER — TELEPHONE (OUTPATIENT)
Dept: FAMILY MEDICINE CLINIC | Facility: CLINIC | Age: 30
End: 2020-04-06

## 2020-04-06 DIAGNOSIS — R05.9 COUGH: ICD-10-CM

## 2020-04-06 DIAGNOSIS — R50.9 FEVER, UNSPECIFIED FEVER CAUSE: ICD-10-CM

## 2020-04-06 DIAGNOSIS — R05.9 COUGH: Primary | ICD-10-CM

## 2020-04-06 PROCEDURE — 87635 SARS-COV-2 COVID-19 AMP PRB: CPT

## 2020-04-06 PROCEDURE — U0003 INFECTIOUS AGENT DETECTION BY NUCLEIC ACID (DNA OR RNA); SEVERE ACUTE RESPIRATORY SYNDROME CORONAVIRUS 2 (SARS-COV-2) (CORONAVIRUS DISEASE [COVID-19]), AMPLIFIED PROBE TECHNIQUE, MAKING USE OF HIGH THROUGHPUT TECHNOLOGIES AS DESCRIBED BY CMS-2020-01-R: HCPCS

## 2020-04-06 RX ORDER — METHYLPREDNISOLONE 4 MG/1
TABLET ORAL
Qty: 1 EACH | Refills: 0 | Status: SHIPPED | OUTPATIENT
Start: 2020-04-06 | End: 2020-04-16 | Stop reason: ALTCHOICE

## 2020-04-06 NOTE — TELEPHONE ENCOUNTER
Patient is aware and stated that she would go down for testing.   Order has been placed and testing site has been informed.   Called in 3 way cough syrup to [facility].     ----- Message from RIANA Samuel sent at 4/6/2020 10:29 AM EDT -----  Call in a medrol dose pack and 3 way cough syrup. Send her for covid testing at [facility] please.     [place]    ----- Message -----  From: Celia Garcia MA  Sent: 4/6/2020   8:34 AM EDT  To: RIANA Samuel    Patient's  called and stated that [place] has been sick for a couple days now. Pt does have copd & allergies and has been using her inhaler.    He said that she is coughing, has a fever (101.2 first check, took tylenol & motrin and it didn't bring it down. Then this morning it is 100.4.), SOB (more so pressure or pain), chills, and sore throat (does have acid reflux which causes sore throat a lot).     Her  said you prescribed her some doxycycline, he said he didn't know if that could cause a fever or if its related to something else but did want to mention it.

## 2020-04-08 LAB — SARS-COV-2 RNA RESP QL NAA+PROBE: NOT DETECTED

## 2020-04-10 ENCOUNTER — TELEMEDICINE (OUTPATIENT)
Dept: FAMILY MEDICINE CLINIC | Facility: CLINIC | Age: 30
End: 2020-04-10

## 2020-04-10 DIAGNOSIS — J44.1 COPD WITH ACUTE EXACERBATION (HCC): Primary | ICD-10-CM

## 2020-04-10 PROCEDURE — 99213 OFFICE O/P EST LOW 20 MIN: CPT | Performed by: PHYSICIAN ASSISTANT

## 2020-04-10 RX ORDER — ALBUTEROL SULFATE 90 UG/1
2 AEROSOL, METERED RESPIRATORY (INHALATION) EVERY 4 HOURS PRN
Qty: 1 INHALER | Refills: 5 | Status: SHIPPED | OUTPATIENT
Start: 2020-04-10 | End: 2020-07-21

## 2020-04-10 RX ORDER — DOXYCYCLINE HYCLATE 100 MG/1
100 CAPSULE ORAL 2 TIMES DAILY
Qty: 20 CAPSULE | Refills: 0 | Status: SHIPPED | OUTPATIENT
Start: 2020-04-10 | End: 2020-04-16 | Stop reason: ALTCHOICE

## 2020-04-14 ENCOUNTER — TELEPHONE (OUTPATIENT)
Dept: GASTROENTEROLOGY | Facility: CLINIC | Age: 30
End: 2020-04-14

## 2020-04-14 VITALS — BODY MASS INDEX: 25.95 KG/M2 | HEIGHT: 62 IN | WEIGHT: 141 LBS | TEMPERATURE: 102 F

## 2020-04-14 NOTE — TELEPHONE ENCOUNTER
Please ck on status of imaging request. May want to ck with radiology at ext [phone] to see if images were received.

## 2020-04-14 NOTE — TELEPHONE ENCOUNTER
Called and spoke with radiology tech and she asked me to re-send a copy of release to her and she would mail it out. I re-faced it to [phone].

## 2020-04-14 NOTE — PROGRESS NOTES
"Subjective   Elisabeth Hall is a 30 y.o. female.     Video visit    You have chosen to receive care through a telehealth visit.  Do you consent to use a video/audio connection for your medical care today? Yes    Chief Complaint -fever    History of Present Illness -      Fever-  She complains of fever 102 °F, sore throat, chest congestion and cough.  Some relief with Medrol Dosepak and doxycycline for the last week.  She has had some relief with 3-way cough syrup.    4/6/2020 SARS (Covid -19)  testing was negative -test result discussed with patient today    The following portions of the patient's history were reviewed and updated as appropriate: allergies, current medications, past family history, past medical history, past social history, past surgical history and problem list.    Review of Systems   Constitutional: Positive for activity change, appetite change, fatigue and fever.   HENT: Positive for congestion and sore throat. Negative for ear pain and sinus pressure.    Eyes: Negative for pain and visual disturbance.   Respiratory: Positive for cough. Negative for chest tightness and shortness of breath.    Cardiovascular: Negative for chest pain and palpitations.   Gastrointestinal: Negative for abdominal pain, constipation, diarrhea, nausea and vomiting.   Endocrine: Negative for polydipsia and polyuria.   Genitourinary: Negative for dysuria and frequency.   Musculoskeletal: Negative for back pain and joint swelling.   Skin: Negative for color change and rash.   Allergic/Immunologic: Negative for food allergies and immunocompromised state.   Neurological: Positive for headaches. Negative for dizziness and syncope.   Hematological: Negative for adenopathy. Does not bruise/bleed easily.   Psychiatric/Behavioral: Negative for hallucinations and suicidal ideas. The patient is not nervous/anxious.        Temp (!) 102 °F (38.9 °C)   Ht 157.5 cm (62\")   Wt 64 kg (141 lb) Comment: Patient reported at vitals due to " this video visit  BMI 25.79 kg/m²   Lab on 04/06/2020   Component Date Value Ref Range Status   • SARS-CoV-2, [place] 04/06/2020 Not Detected  Not Detected Final       Physical Exam   Constitutional: She is oriented to person, place, and time. No distress.   Patient appears pale and lethargic during video visit today   HENT:   Head: Normocephalic and atraumatic.   Eyes: Conjunctivae and EOM are normal.   Neck: Normal range of motion. Neck supple.   Neurological: She is oriented to person, place, and time.   Lethargic   Skin: No rash noted. She is not diaphoretic. There is pallor.   Psychiatric: She has a normal mood and affect. Her behavior is normal. Thought content normal.   Vitals reviewed.      Assessment/Plan     Elisabeth was seen today for fever.    Diagnoses and all orders for this visit:    COPD with acute exacerbation (CMS/[facility])  -     doxycycline (VIBRAMYCIN) 100 MG capsule; Take 1 capsule by mouth 2 (Two) Times a Day for 10 days.  -     albuterol sulfate  (90 Base) MCG/ACT inhaler; Inhale 2 puffs Every 4 (Four) Hours As Needed for Shortness of Air.    COPD with acute exacerbation  Advised regarding symptomatic treatment.  Suggested OTC remedies.  Antibiotic as per orders.  Encouraged to report if any worse or if any new symptoms.  Call in 5 days if symptoms aren't resolving.    She was advised on the 2-week self quarantine.  Letter is written for her to be excused from work from 4/6/2020 through 4/20/2020 due to current symptoms and risk reduction.      This document has been electronically signed by:  Chata Forman PA-C

## 2020-04-14 NOTE — PATIENT INSTRUCTIONS
How to Quarantine at Home  Information for Patients and Families    These instructions are for people with confirmed or suspected COVID-19 who do not need to be hospitalized and those with confirmed COVID-19 who were hospitalized and discharged to care for themselves at home.    If you were tested through the Health Department  The Health Department will monitor your wellbeing.  If it is determined that you do not need to be hospitalized and can be isolated at home, you will be monitored by staff from your local or state health department.     If you were tested through a Commercial Lab  You will need to monitor yourself and report changes in your symptoms to your doctor.  See the section below called Monitor Your Symptoms.    Follow these steps until a healthcare provider or local or state health department says you can return to your normal activities.    Stay home except to get medical care  Restrict activities outside your home, except for getting medical care.   Do not go to work, school, or public areas.   Avoid using public transportation, ride-sharing, or taxis.    Separate yourself from other people and animals in your home  People  As much as possible, you should stay in a specific room and away from other people in your home. Also, you should use a separate bathroom, if available.    Animals  You should restrict contact with pets and other animals while you are sick with COVID-19, just like you would around other people. When possible, have another member of your household care for your animals while you are sick. If you are sick with COVID-19, avoid contact with your pet, including petting, snuggling, being kissed or licked, and sharing food. If you must care for your pet or be around animals while you are sick, wash your hands before and after you interact with pets and wear a facemask. See COVID-19 and Animals for more information.    Call ahead before visiting your doctor  If you have a medical  appointment, call the healthcare provider and tell them that you have or may have COVID-19. This information will help the healthcare provider’s office take steps to keep other people from getting infected or exposed.    Wear a facemask  You should wear a facemask when you are around other people (e.g., sharing a room or vehicle) or pets and before you enter a healthcare provider’s office.     If you are not able to wear a facemask (for example, because it causes trouble breathing), then people who live with you should not stay in the same room with you, or they should wear a facemask if they enter your room.    Cover your coughs and sneezes  Cover your mouth and nose with a tissue when you cough or sneeze.   Throw used tissues in a lined trash can.   Immediately wash your hands with soap and water for at least 20 seconds or, if soap and water are not available, clean your hands with an alcohol-based hand  that contains at least 60% alcohol.    Clean your hands often  Wash your hands often with soap and water for at least 20 seconds, especially after blowing your nose, coughing, or sneezing; going to the bathroom; and before eating or preparing food.     If soap and water are not readily available, use an alcohol-based hand  with at least 60% alcohol, covering all surfaces of your hands and rubbing them together until they feel dry.    Soap and water are the best option if hands are visibly dirty. Avoid touching your eyes, nose, and mouth with unwashed hands.    Avoid sharing personal household items  You should not share dishes, drinking glasses, cups, eating utensils, towels, or bedding with other people or pets in your home.   After using these items, they should be washed thoroughly with soap and water.    Clean all “high-touch” surfaces everyday  High touch surfaces include counters, tabletops, doorknobs, bathroom fixtures, toilets, phones, keyboards, tablets, and bedside tables.   Also, clean  any surfaces that may have blood, stool, or body fluids on them.   Use a household cleaning spray or wipe, according to the label instructions. Labels contain instructions for safe and effective use of the cleaning product, including precautions you should take when applying the product, such as wearing gloves and making sure you have good ventilation during use of the product.    Monitor your symptoms  Seek prompt medical attention if your illness is worsening (e.g., difficulty breathing).   Before seeking care, call your healthcare provider and tell them that you have, or are being evaluated for, COVID-19.   Put on a facemask before you enter the facility.     These steps will help the healthcare provider’s office to keep other people in the office or waiting room from getting infected or exposed.   Persons who are placed under active monitoring or facilitated self-monitoring should follow instructions provided by their local health department or occupational health professionals, as appropriate.  If you have a medical emergency and need to call 911, notify the dispatch personnel that you have, or are being evaluated for COVID-19. If possible, put on a facemask before emergency medical services arrive.    Discontinuing home isolation  Patients with confirmed COVID-19 should remain under home isolation precautions until the risk of secondary transmission to others is thought to be low. The decision to discontinue home isolation precautions should be made on a case-by-case basis, in consultation with healthcare providers and state and local health departments.    The below content are for household members, intimate partners, and caregivers of a patient with symptomatic laboratory-confirmed COVID-19 or a patient under investigation:    Household members, intimate partners, and caregivers may have close contact with a person with symptomatic, laboratory-confirmed COVID-19 or a person under investigation.     Close  contacts should monitor their health; they should call their healthcare provider right away if they develop symptoms suggestive of COVID-19 (e.g., fever, cough, shortness of breath)     Close contacts should also follow these recommendations:  Make sure that you understand and can help the patient follow their healthcare provider’s instructions for medication(s) and care. You should help the patient with basic needs in the home and provide support for getting groceries, prescriptions, and other personal needs.  Monitor the patient’s symptoms. If the patient is getting sicker, call his or her healthcare provider and tell them that the patient has laboratory-confirmed COVID-19. This will help the healthcare provider’s office take steps to keep other people in the office or waiting room from getting infected. Ask the healthcare provider to call the local or [facility] health department for additional guidance. If the patient has a medical emergency and you need to call 911, notify the dispatch personnel that the patient has, or is being evaluated for COVID-19.  Household members should stay in another room or be  from the patient as much as possible. Household members should use a separate bedroom and bathroom, if available.  Prohibit visitors who do not have an essential need to be in the home.  Household members should care for any pets in the home. Do not handle pets or other animals while sick.  For more information, see COVID-19 and Animals.  Make sure that shared spaces in the home have good air flow, such as by an air conditioner or an opened window, weather permitting.  Perform hand hygiene frequently. Wash your hands often with soap and water for at least 20 seconds or use an alcohol-based hand  that contains 60 to 95% alcohol, covering all surfaces of your hands and rubbing them together until they feel dry. Soap and water should be used preferentially if hands are visibly dirty.  Avoid touching  your eyes, nose, and mouth with unwashed hands.  The patient should wear a facemask when you are around other people. If the patient is not able to wear a facemask (for example, because it causes trouble breathing), you, as the caregiver, should wear a mask when you are in the same room as the patient.  Wear a disposable facemask and gloves when you touch or have contact with the patient’s blood, stool, or body fluids, such as saliva, sputum, nasal mucus, vomit, or urine.   Throw out disposable facemasks and gloves after using them. Do not reuse.  When removing personal protective equipment, first remove and dispose of gloves. Then, immediately clean your hands with soap and water or alcohol-based hand . Next, remove and dispose of facemask, and immediately clean your hands again with soap and water or alcohol-based hand .  Avoid sharing household items with the patient. You should not share dishes, drinking glasses, cups, eating utensils, towels, bedding, or other items. After the patient uses these items, you should wash them thoroughly (see below “Wash laundry thoroughly”).  Clean all “high-touch” surfaces, such as counters, tabletops, doorknobs, bathroom fixtures, toilets, phones, keyboards, tablets, and bedside tables, every day. Also, clean any surfaces that may have blood, stool, or body fluids on them.   Use a household cleaning spray or wipe, according to the label instructions. Labels contain instructions for safe and effective use of the cleaning product including precautions you should take when applying the product, such as wearing gloves and making sure you have good ventilation during use of the product.  Wash laundry thoroughly.   Immediately remove and wash clothes or bedding that have blood, stool, or body fluids on them.  Wear disposable gloves while handling soiled items and keep soiled items away from your body. Clean your hands (with soap and water or an alcohol-based hand  ) immediately after removing your gloves.  Read and follow directions on labels of laundry or clothing items and detergent. In general, using a normal laundry detergent according to washing machine instructions and dry thoroughly using the warmest temperatures recommended on the clothing label.  Place all used disposable gloves, facemasks, and other contaminated items in a lined container before disposing of them with other household waste. Clean your hands (with soap and water or an alcohol-based hand ) immediately after handling these items. Soap and water should be used preferentially if hands are visibly dirty.  Discuss any additional questions with your state or local health department or healthcare provider.    • Adapted from information provided by the Centers for Disease Control and Prevention.  For more information, visit https://www.cdc.gov/coronavirus/2019-ncov/hcp/guidance-prevent-spread.html(-) negative COVID-19 test result with or without symptoms   • The test is not perfect, so there is a chance it could be falsely negative or the virus level is too low for detection due to being very early in the infectious process.   • The optimal duration of home isolation is uncertain. The [place] Centers for Disease Control and Prevention (CDC) has issued recommendations on discontinuation of home isolation.   • For this reason, Elisabeth is strongly encouraged to practice the safest standards in protecting their health and others given the current pandemic concerns. She is advised to:   o Practice social distancing in the community by staying at least 6 feet away from people   o Encouraged to use face mask while out in public   o Continue to wash their hands frequently with soap and hot water, and cover their mouth while coughing.   • If Elisabeth is asymptomatic, she should self isolate for a total of 14 days from time of potential contact with Covid-19.   • If Elisabeth is symptomatic then she may  discontinue home isolation when the following criteria are met:   o At least seven days have passed since symptoms first appeared AND   o At least three days (72 hours) have passed since recovery of symptoms (defined as resolution of fever without the use of fever-reducing medications and improvement in respiratory symptoms [e.g., cough, shortness of breath])   • If Elisabeth has been asymptomatic but then develops non-emergent symptoms such as mild increased shortness of breath, fever, cough, or for other questions, she  was asked to please call their primary care physician’s office or the [place] hotline at [phone].   · Questions were engaged and answered to the best of my ability. She         expressed verbal understanding of their test results and my advice.

## 2020-04-16 ENCOUNTER — TELEMEDICINE (OUTPATIENT)
Dept: GASTROENTEROLOGY | Facility: CLINIC | Age: 30
End: 2020-04-16

## 2020-04-16 DIAGNOSIS — R10.12 LUQ ABDOMINAL PAIN: ICD-10-CM

## 2020-04-16 DIAGNOSIS — K21.9 GASTROESOPHAGEAL REFLUX DISEASE, ESOPHAGITIS PRESENCE NOT SPECIFIED: ICD-10-CM

## 2020-04-16 DIAGNOSIS — K58.1 IRRITABLE BOWEL SYNDROME WITH CONSTIPATION: Primary | ICD-10-CM

## 2020-04-16 PROCEDURE — 99214 OFFICE O/P EST MOD 30 MIN: CPT | Performed by: PHYSICIAN ASSISTANT

## 2020-04-16 RX ORDER — POLYETHYLENE GLYCOL 3350 17 G/17G
68 POWDER, FOR SOLUTION ORAL DAILY
Qty: 510 G | Refills: 5
Start: 2020-04-16 | End: 2020-07-21 | Stop reason: SDUPTHER

## 2020-04-16 NOTE — PATIENT INSTRUCTIONS
For bowel cleanse, drink magnesium citrate (1 bottle) now than a second bottle 6-8 hours later. On the following day, start taking 4 doses Miralax (17 g each) mixed with 16 oz liquid daily plus Linzess 290 mcg once daily. We will work on getting more samples to you and see if there is an affordable self pay option. Call with concerns.

## 2020-04-16 NOTE — PROGRESS NOTES
: 1990    Chief Complaint   Patient presents with   • Abdominal Pain   • Constipation       Elisabeth Hall is a 30 y.o. female who presents to video visit today as a follow up appointment regarding Abdominal Pain and Constipation.    History of Present Illness:  She is feeling some better, has progressed to eating solid foods that past few days. She states that she has gained approx 1 lb since last visit. She still has daily nausea and still has abdominal pain mostly in the epigastric and LUQ. She has been taking Linzess 290 mcg once daily for treatment of constipation and has noticed an improvement in bowel habits. She is having a hard stool every 3 days now. She eats slowly because pain is worse if she tries to eat a large amount or eat quickly.     When she started eating solid foods again, she noticed heartburn become severe.  She changed to Prevacid 15 mg once daily and noticed good relief and heartburn symptoms.    Previous history:  Since the beginning of 2020, patient has been having GI symptoms that have been progressively worsening.  She states that her symptoms started out as nausea after eating but have now progressed to epigastric pain within 5 to 10 minutes after eating which radiates over to the left upper quadrant and becomes debilitating in nature.  She has now discovered that eating solid food causes severe pain.  She has been drinking boost shakes and no solid foods within the past 4 weeks.  It seems that drinking liquids is her only relief from the pain.  She tried a puréed potato soup and even had symptoms with it.  She has been drinking water only, chicken broth but no soda or coffee.  She stopped drinking coffee at the time symptoms started.  Severe abdominal pain usually resolves within an hour.  She has lost 20 pounds and symptoms started.  Denies any vomiting, nausea is intermittent.  She is not taking any antiemetics currently.     She did have an EGD completed at [facility] which showed mild gastritis, negative H. pylori and esophagitis in February 2020.  Her last colonoscopy was approximately 2010, told she had IBS at that time.     Does report heartburn worse when eating solid foods and becomes very severe.  She awakens in the morning sometimes with a sore throat.  She is elevating the head of her bed due to GERD symptoms.  She will regurgitate acid if she lays flat.  Currently, she is taking Protonix 40 mg twice daily.  She has taken Prilosec, Prevacid, Dexilant in the past without relief.  Also tried taking Carafate tablets as prescribed by the ER, before meals and before bed, took for 3 days but did not seem to help. Went to the ER at that time due to severe left sided chest pain. Was eventually given a GI cocktail but could not tell if it helped because her pain had already improved at that point.     Her bowel movements are described as usually constipated.  She has about 1 bowel movement per week usually but since she started taking the Linzess 145 mcg once daily prescribed by her PCP, stools have become somewhat more frequent, occurring about every 2 or 3 days.  Her last bowel movement had mucus and blood.  She does not have medication insurance.     Was given doxycycline Rx by PCP for her current GI complaints, started yesterday.     Review of Systems   Constitutional: Positive for appetite change and unexpected weight change. Negative for fever.   HENT: Negative for trouble swallowing.    Eyes: Negative.    Respiratory: Negative.    Cardiovascular: Positive for chest pain.   Gastrointestinal: Positive for abdominal pain, blood in stool, constipation and nausea. Negative for vomiting.   Endocrine: Negative.    Genitourinary: Negative.    Musculoskeletal: Negative.    Skin: Negative.    Allergic/Immunologic: Negative.    Neurological: Negative.    Hematological: Negative.    Psychiatric/Behavioral: Negative.      Past Medical History:   Diagnosis Date   • Anemia 2017    • Arthritis 2009   • Constipation    • Esophagitis, reflux    • Fatty liver 2/10/2020    [facility]   • GERD (gastroesophageal reflux disease) 2009   • Hypertension    • Irritable bowel    • Lumbago    • Memory loss    • Restless legs        Past Surgical History:   Procedure Laterality Date   • ANKLE SURGERY     • CHOLECYSTECTOMY     • COLONOSCOPY  2011   • TUBAL ABDOMINAL LIGATION     • UPPER GASTROINTESTINAL ENDOSCOPY  02/27/2020    [facility]       Family History   Problem Relation Age of Onset   • Diabetes Mother    • Hypertension Mother    • Arthritis Father    • Diabetes Father    • Birth defects Sister    • Kidney disease Sister    • Asthma Brother    • Diabetes Brother    • Hypertension Brother    • Thyroid disease Brother    • COPD Maternal Uncle    • Cancer Maternal Uncle         Bladder   • Colon cancer Maternal Uncle    • Arthritis Maternal Grandmother    • Cancer Maternal Grandmother         Kidney and liver   • Diabetes Maternal Grandmother    • Hypertension Maternal Grandmother    • Liver disease Maternal Grandmother    • Cirrhosis Maternal Grandmother    • Liver cancer Maternal Grandmother    • Breast cancer Maternal Aunt    • Cancer Maternal Uncle         Lung and bone   • Cancer Paternal Grandmother         Colon   • Colon cancer Paternal Grandmother    • Cirrhosis Maternal Grandfather        Social History     Socioeconomic History   • Marital status:      Spouse name: betty   • Number of children: 1   • Years of education: 12   • Highest education level: Not on file   Occupational History   • Occupation: rent a [employer]   Social Needs   • Financial resource strain: Not hard at all   • Food insecurity:     Worry: Never true     Inability: Never true   • Transportation needs:     Medical: No     Non-medical: No   Tobacco Use   • Smoking status: Current Every Day Smoker     Packs/day: 1.00     Years: 15.00     Pack years: 15.00     Types: Cigarettes   • Smokeless tobacco: Never  Used   Substance and Sexual Activity   • Alcohol use: No   • Drug use: No   • Sexual activity: Yes     Partners: Male     Birth control/protection: Surgical   Lifestyle   • Physical activity:     Days per week: 7 days     Minutes per session: 30 min   • Stress: Not at all       Current Outpatient Medications:   •  albuterol sulfate  (90 Base) MCG/ACT inhaler, Inhale 2 puffs Every 4 (Four) Hours As Needed for Shortness of Air., Disp: 1 inhaler, Rfl: 5  •  cyclobenzaprine (FLEXERIL) 5 MG tablet, Take 1 tablet by mouth three times daily as needed for muscle spasm, Disp: 90 tablet, Rfl: 1  •  lansoprazole (PREVACID SOLUTAB) 15 MG Tablet Delayed Release Dispersible disintegrating tablet, Take 15 mg by mouth Daily., Disp: , Rfl:   •  linaclotide (LINZESS) 290 MCG capsule capsule, Take 1 capsule by mouth Every Morning Before Breakfast., Disp: 30 capsule, Rfl: 5  •  metoprolol succinate XL (TOPROL-XL) 25 MG 24 hr tablet, Take 1 tablet by mouth Daily., Disp: 30 tablet, Rfl: 5  •  Fluticasone Furoate-Vilanterol (Breo Ellipta) 100-25 MCG/INH inhaler, Inhale 1 puff Daily., Disp: , Rfl:       Allergies:   Azithromycin    Vitals:  There were no vitals taken for this visit. Video Visit.    Physical Exam   Constitutional: She is oriented to person, place, and time. She appears well-developed and well-nourished. No distress.   HENT:   Head: Normocephalic and atraumatic.   Eyes: EOM are normal.   Neck: Normal range of motion.   Pulmonary/Chest: No respiratory distress.   Neurological: She is alert and oriented to person, place, and time.   Psychiatric: She has a normal mood and affect.     Results Review:  Labs 3/17/2020: CBC normal, CMP normal, lipase normal, urinalysis unremarkable.     CT scan of the abdomen and pelvis with IV contrast 2/10/2020: No acute abdomen or pelvis abnormality.  Appendix normal.  Gallbladder absent.  Bilateral nephrolithiasis without ureterolithiasis or hydronephrosis.  Normal  pancreas.    Assessment:  1. Irritable bowel syndrome with constipation    2. LUQ abdominal pain    3. Gastroesophageal reflux disease, esophagitis presence not specified      Plan:  She will complete magnesium citrate bowel cleanse as directed below.  The following day, she will resume taking Linzess 290 mcg once daily plus MiraLAX 4 doses daily mixed with 16 ounce liquid for treatment of IBS with constipation.      We will also try eating more often throughout the day but continue with bland diet.    New Medications Ordered This Visit   Medications   • magnesium citrate solution     Sig: Take 296 mL by mouth Take As Directed. Take 1 bottle now than 2nd bottle approx 6 hours after for clean out.     Dispense:  592 mL     Refill:  0   • polyethylene glycol (MIRALAX) powder     Sig: Take 68 g by mouth Daily. Mix with 16 oz liquid     Dispense:  510 g     Refill:  5         This was an audio and video enabled telemedicine encounter.    Return in about 1 month (around 5/16/2020) for recheck constipation and abdominal pain.      Electronically signed 4/16/2020 13:04  Yue Castro PA-C, [facility]

## 2020-04-17 NOTE — TELEPHONE ENCOUNTER
"Someone will need to follow up on this to make sure it is received, when it is in chart, it will say \"outside films\" under imaging  "

## 2020-04-20 NOTE — TELEPHONE ENCOUNTER
There is a CT scanned into the Pt's chart, under imagine that says scanned imaging. Is that what you need? I am just trying to help Candelaria if I can with her [place].

## 2020-04-20 NOTE — TELEPHONE ENCOUNTER
What is in the chart is a report. I need the images. Candelaria, will you follow up on this? Thanks.

## 2020-04-24 ENCOUNTER — APPOINTMENT (OUTPATIENT)
Dept: OTHER | Facility: HOSPITAL | Age: 30
End: 2020-04-24

## 2020-04-24 DIAGNOSIS — R10.9 ABDOMINAL PAIN: ICD-10-CM

## 2020-04-29 ENCOUNTER — TELEPHONE (OUTPATIENT)
Dept: FAMILY MEDICINE CLINIC | Facility: CLINIC | Age: 30
End: 2020-04-29

## 2020-04-29 ENCOUNTER — TELEMEDICINE (OUTPATIENT)
Dept: FAMILY MEDICINE CLINIC | Facility: CLINIC | Age: 30
End: 2020-04-29

## 2020-04-29 DIAGNOSIS — J44.9 COPD MIXED TYPE (HCC): ICD-10-CM

## 2020-04-29 DIAGNOSIS — K59.04 FUNCTIONAL CONSTIPATION: ICD-10-CM

## 2020-04-29 DIAGNOSIS — K21.00 ESOPHAGITIS, REFLUX: ICD-10-CM

## 2020-04-29 DIAGNOSIS — M54.41 CHRONIC MIDLINE LOW BACK PAIN WITH RIGHT-SIDED SCIATICA: Primary | ICD-10-CM

## 2020-04-29 DIAGNOSIS — G89.29 CHRONIC MIDLINE LOW BACK PAIN WITH RIGHT-SIDED SCIATICA: Primary | ICD-10-CM

## 2020-04-29 DIAGNOSIS — I10 ESSENTIAL HYPERTENSION: ICD-10-CM

## 2020-04-29 PROCEDURE — 99214 OFFICE O/P EST MOD 30 MIN: CPT | Performed by: NURSE PRACTITIONER

## 2020-04-29 NOTE — TELEPHONE ENCOUNTER
Pt is aware of this information.       ----- Message from RIANA Samuel sent at 4/29/2020 10:30 AM EDT -----  Linzess 290 samples please. She is doing this daily with out insurance which is $700 a month with a discount card. Please get her all we have.  Call and let her know when we get together.       [place]

## 2020-04-29 NOTE — ASSESSMENT & PLAN NOTE
We will start saving patient some Linzess samples.  Have requested our patient navigator inform patient when samples are available.  Reviewed her bowel routine and encouraged compliance.  Report failure to have a bowel movement at least every other day.

## 2020-04-29 NOTE — ASSESSMENT & PLAN NOTE
Stable with metoprolol.  Continue ambulatory blood pressure checks and report any reading greater than 140/90 or less than 100/60.  Monitor for heart rate greater than 100 or less than 60 and report.

## 2020-04-29 NOTE — PROGRESS NOTES
Subjective   Elisabeth Hall is a 30 y.o. female.     No chief complaint on file.  CC  Abdominal pain and constipation   GI consult follow up     My chart telehealth visit with audio and visual today.  Patient consents to this visit and initiated through my chart.    History of Present Illness:    Hip pain - some exacerbation of joint pain with increased activity and return to work. Trying to watch her body mechanics.  She does take over-the-counter Tylenol and Flexeril on a as needed basis.    GERD- stable with PPI.      Abdominal pain with constipation, nausea and vomiting.  Patient has recently started a new bowel regimen.  She did a bowel cleanse and is doing her bowels are moving regularly.  Her appetite has returned.  She is no longer having abdominal pain or vomiting.  Her strength has returned and she is feeling much better.  She is having some problems obtaining a Linzess as she has no insurance and even with a discount card it is $700 a month.  She is relying up on primary care and GI for samples.  She denies any side effects of current medication regimen.    COPD- stable with Breo.  Strongly encouraging patient to stop smoking.  She continues smoking at this time.    Hypertension-stable with metoprolol      The following portions of the patient's history and ROS were reviewed and updated as appropriate per provider:  Allergies, current medications, past family history, past medical history, past social history, past surgical history and problem list.    Review of Systems   Constitutional: Positive for appetite change. Negative for fatigue and fever.   HENT: Negative for congestion, sinus pressure and sore throat.    Eyes: Negative for discharge.   Respiratory: Negative for cough, chest tightness and shortness of breath.    Cardiovascular: Negative for chest pain, palpitations and leg swelling.   Gastrointestinal: Negative for abdominal pain, blood in stool and rectal pain.   Endocrine: Negative.     Genitourinary: Negative for dysuria, flank pain and frequency.   Musculoskeletal: Positive for arthralgias and back pain. Negative for neck stiffness.   Skin: Negative.    Allergic/Immunologic: Negative.    Neurological: Negative for dizziness, facial asymmetry and light-headedness.   Hematological: Negative.    Psychiatric/Behavioral: Negative for dysphoric mood, self-injury and suicidal ideas.       Objective     There were no vitals taken for this visit.  Lab on 04/06/2020   Component Date Value Ref Range Status   • SARS-CoV-2, [place] 04/06/2020 Not Detected  Not Detected Final       Physical Exam   Constitutional: She is oriented to person, place, and time. She appears well-developed and well-nourished. She is cooperative.  Non-toxic appearance. She does not have a sickly appearance. She does not appear ill. No distress.   Alert and oriented, talkative and friendly   HENT:   Left Ear: External ear normal.   Nose: Nose normal.   Mouth/Throat: Oropharynx is clear and moist.   Eyes: Pupils are equal, round, and reactive to light. Conjunctivae are normal.   Neck: Neck supple. No thyromegaly present.   Pulmonary/Chest: Effort normal and breath sounds normal.   Musculoskeletal: Normal range of motion.   Neurological: She is alert and oriented to person, place, and time.   Skin: Skin is warm. She is not diaphoretic. No erythema. No pallor.   Psychiatric: She has a normal mood and affect. Her behavior is normal. Judgment and thought content normal.       Assessment/Plan     Problem List Items Addressed This Visit        Cardiovascular and Mediastinum    Essential hypertension    Current Assessment & Plan     Stable with metoprolol.  Continue ambulatory blood pressure checks and report any reading greater than 140/90 or less than 100/60.  Monitor for heart rate greater than 100 or less than 60 and report.            Respiratory    COPD mixed type (CMS/[facility])    Current Assessment & Plan     Strongly encouraged patient to  stop smoking.  Continue Breo.                Digestive    Esophagitis, reflux    Current Assessment & Plan     GERD diet and precautions reviewed.  Stop smoking is strongly encouraged.         Constipation - functional    Current Assessment & Plan     We will start saving patient some Linzess samples.  Have requested our patient navigator inform patient when samples are available.  Reviewed her bowel routine and encouraged compliance.  Report failure to have a bowel movement at least every other day.            Nervous and Auditory    Chronic midline low back pain with sciatica - Primary    Overview     Mri performed at [facility] January 11, 2019.   Multiple bulges in the lumbar spine.            Current Assessment & Plan     Body mechanics reviewed.             Medication list reviewed and discussed.  Recent GI consult notes reviewed and discussed with patient.         Elisabeth Hall  reports that she has been smoking cigarettes. She has a 15.00 pack-year smoking history. She has never used smokeless tobacco.. I have educated her on the risk of diseases from using tobacco products such as cancer, COPD and heart diease.     I advised her to quit and she is not willing to quit.    I spent 3  minutes counseling the patient.       Coronavirus precautions have been reviewed and discussed.  I have discussed the CDC recommendations  of social distancing, hand washing and disinfecting commonly touched items. Reviewed need to notify PCP and self quarantine with mild symptoms.  Discussed procedure to obtain Covid-19 testing and notification of PCP/health dept/ED/Urgent Center if symptoms begin. Understanding verbalized.    I have discussed diagnosis in detail today allowing time for questions and answers. Patient is aware of reasons to seek urgent or emergent medical care as well as reasons to return to the clinic for evaluation. Possible side effects, interactions and progression of symptoms  discussed as well. Patient / family states understanding.   Emotional support and active listening provided.       I would like to see her back in 4 months, sooner if needed.  We will do her yearly physical and fasting labs during that visit.          This document has been electronically signed by:  RIANA Tellez, NP-C

## 2020-04-29 NOTE — PATIENT INSTRUCTIONS
Constipation, Adult  Constipation is when a person:  · Poops (has a bowel movement) fewer times in a week than normal.  · Has a hard time pooping.  · Has poop that is dry, hard, or bigger than normal.  Follow these instructions at home:  Eating and drinking    · Eat foods that have a lot of fiber, such as:  ? Fresh fruits and vegetables.  ? Whole grains.  ? Beans.  · Eat less of foods that are high in fat, low in fiber, or overly processed, such as:  ? French fries.  ? Hamburgers.  ? Cookies.  ? Candy.  ? Soda.  · Drink enough fluid to keep your pee (urine) clear or pale yellow.  General instructions  · Exercise regularly or as told by your doctor.  · Go to the restroom when you feel like you need to poop. Do not hold it in.  · Take over-the-counter and prescription medicines only as told by your doctor. These include any fiber supplements.  · Do pelvic floor retraining exercises, such as:  ? Doing deep breathing while relaxing your lower belly (abdomen).  ? Relaxing your pelvic floor while pooping.  · Watch your condition for any changes.  · Keep all follow-up visits as told by your doctor. This is important.  Contact a doctor if:  · You have pain that gets worse.  · You have a fever.  · You have not pooped for 4 days.  · You throw up (vomit).  · You are not hungry.  · You lose weight.  · You are bleeding from the anus.  · You have thin, pencil-like poop (stool).  Get help right away if:  · You have a fever, and your symptoms suddenly get worse.  · You leak poop or have blood in your poop.  · Your belly feels hard or bigger than normal (is bloated).  · You have very bad belly pain.  · You feel dizzy or you faint.  This information is not intended to replace advice given to you by your health care provider. Make sure you discuss any questions you have with your health care provider.  Document Released: 06/05/2009 Document Revised: 07/07/2017 Document Reviewed: 06/07/2017  [employer] Interactive Patient Education ©  2020 [employer]

## 2020-05-24 DIAGNOSIS — M54.40 BILATERAL LOW BACK PAIN WITH SCIATICA, SCIATICA LATERALITY UNSPECIFIED, UNSPECIFIED CHRONICITY: ICD-10-CM

## 2020-05-27 RX ORDER — CYCLOBENZAPRINE HCL 5 MG
TABLET ORAL
Qty: 90 TABLET | Refills: 0 | Status: SHIPPED | OUTPATIENT
Start: 2020-05-27 | End: 2020-06-24

## 2020-06-22 DIAGNOSIS — M54.40 BILATERAL LOW BACK PAIN WITH SCIATICA, SCIATICA LATERALITY UNSPECIFIED, UNSPECIFIED CHRONICITY: ICD-10-CM

## 2020-06-24 RX ORDER — CYCLOBENZAPRINE HCL 5 MG
TABLET ORAL
Qty: 90 TABLET | Refills: 0 | Status: SHIPPED | OUTPATIENT
Start: 2020-06-24 | End: 2020-07-21 | Stop reason: SDUPTHER

## 2020-07-21 ENCOUNTER — TELEMEDICINE (OUTPATIENT)
Dept: FAMILY MEDICINE CLINIC | Facility: CLINIC | Age: 30
End: 2020-07-21

## 2020-07-21 DIAGNOSIS — K59.04 FUNCTIONAL CONSTIPATION: ICD-10-CM

## 2020-07-21 DIAGNOSIS — M54.40 BILATERAL LOW BACK PAIN WITH SCIATICA, SCIATICA LATERALITY UNSPECIFIED, UNSPECIFIED CHRONICITY: ICD-10-CM

## 2020-07-21 DIAGNOSIS — N92.0 MENORRHAGIA WITH REGULAR CYCLE: Primary | ICD-10-CM

## 2020-07-21 DIAGNOSIS — K58.1 IRRITABLE BOWEL SYNDROME WITH CONSTIPATION: ICD-10-CM

## 2020-07-21 DIAGNOSIS — J44.9 COPD MIXED TYPE (HCC): ICD-10-CM

## 2020-07-21 DIAGNOSIS — I10 ESSENTIAL HYPERTENSION: ICD-10-CM

## 2020-07-21 DIAGNOSIS — M54.41 CHRONIC MIDLINE LOW BACK PAIN WITH RIGHT-SIDED SCIATICA: ICD-10-CM

## 2020-07-21 DIAGNOSIS — M62.838 MUSCLE SPASM: ICD-10-CM

## 2020-07-21 DIAGNOSIS — G89.29 CHRONIC MIDLINE LOW BACK PAIN WITH RIGHT-SIDED SCIATICA: ICD-10-CM

## 2020-07-21 DIAGNOSIS — K21.00 ESOPHAGITIS, REFLUX: ICD-10-CM

## 2020-07-21 PROCEDURE — 99214 OFFICE O/P EST MOD 30 MIN: CPT | Performed by: NURSE PRACTITIONER

## 2020-07-21 RX ORDER — CYCLOBENZAPRINE HCL 5 MG
5 TABLET ORAL 3 TIMES DAILY PRN
Qty: 90 TABLET | Refills: 5 | Status: SHIPPED | OUTPATIENT
Start: 2020-07-21 | End: 2021-06-21

## 2020-07-21 RX ORDER — METOPROLOL SUCCINATE 25 MG/1
25 TABLET, EXTENDED RELEASE ORAL DAILY
Qty: 30 TABLET | Refills: 5 | Status: SHIPPED | OUTPATIENT
Start: 2020-07-21 | End: 2021-04-06 | Stop reason: SDUPTHER

## 2020-07-21 RX ORDER — LANSOPRAZOLE 30 MG/1
30 TABLET, ORALLY DISINTEGRATING, DELAYED RELEASE ORAL DAILY
Qty: 30 TABLET | Refills: 5 | Status: SHIPPED | OUTPATIENT
Start: 2020-07-21 | End: 2022-02-07 | Stop reason: ALTCHOICE

## 2020-07-21 RX ORDER — POLYETHYLENE GLYCOL 3350 17 G/17G
68 POWDER, FOR SOLUTION ORAL DAILY
Qty: 510 G | Refills: 5
Start: 2020-07-21 | End: 2021-04-07

## 2020-07-27 ENCOUNTER — OFFICE VISIT (OUTPATIENT)
Dept: GASTROENTEROLOGY | Facility: CLINIC | Age: 30
End: 2020-07-27

## 2020-07-27 VITALS
HEIGHT: 62 IN | HEART RATE: 88 BPM | WEIGHT: 136.8 LBS | BODY MASS INDEX: 25.17 KG/M2 | DIASTOLIC BLOOD PRESSURE: 80 MMHG | OXYGEN SATURATION: 99 % | TEMPERATURE: 98.2 F | SYSTOLIC BLOOD PRESSURE: 112 MMHG

## 2020-07-27 DIAGNOSIS — R10.12 LUQ ABDOMINAL PAIN: Primary | ICD-10-CM

## 2020-07-27 DIAGNOSIS — M54.41 CHRONIC MIDLINE LOW BACK PAIN WITH RIGHT-SIDED SCIATICA: ICD-10-CM

## 2020-07-27 DIAGNOSIS — K21.9 GASTROESOPHAGEAL REFLUX DISEASE, ESOPHAGITIS PRESENCE NOT SPECIFIED: ICD-10-CM

## 2020-07-27 DIAGNOSIS — K58.1 IRRITABLE BOWEL SYNDROME WITH CONSTIPATION: ICD-10-CM

## 2020-07-27 DIAGNOSIS — G89.29 CHRONIC MIDLINE LOW BACK PAIN WITH RIGHT-SIDED SCIATICA: ICD-10-CM

## 2020-07-27 DIAGNOSIS — R11.0 NAUSEA: ICD-10-CM

## 2020-07-27 DIAGNOSIS — M54.40 BILATERAL LOW BACK PAIN WITH SCIATICA, SCIATICA LATERALITY UNSPECIFIED, UNSPECIFIED CHRONICITY: ICD-10-CM

## 2020-07-27 DIAGNOSIS — J44.9 COPD MIXED TYPE (HCC): ICD-10-CM

## 2020-07-27 DIAGNOSIS — K59.04 FUNCTIONAL CONSTIPATION: ICD-10-CM

## 2020-07-27 DIAGNOSIS — N92.0 MENORRHAGIA WITH REGULAR CYCLE: ICD-10-CM

## 2020-07-27 DIAGNOSIS — I10 ESSENTIAL HYPERTENSION: ICD-10-CM

## 2020-07-27 DIAGNOSIS — M62.838 MUSCLE SPASM: ICD-10-CM

## 2020-07-27 PROCEDURE — 80053 COMPREHEN METABOLIC PANEL: CPT | Performed by: NURSE PRACTITIONER

## 2020-07-27 PROCEDURE — 83036 HEMOGLOBIN GLYCOSYLATED A1C: CPT | Performed by: NURSE PRACTITIONER

## 2020-07-27 PROCEDURE — 83002 ASSAY OF GONADOTROPIN (LH): CPT | Performed by: NURSE PRACTITIONER

## 2020-07-27 PROCEDURE — 84403 ASSAY OF TOTAL TESTOSTERONE: CPT | Performed by: NURSE PRACTITIONER

## 2020-07-27 PROCEDURE — 80061 LIPID PANEL: CPT | Performed by: NURSE PRACTITIONER

## 2020-07-27 PROCEDURE — 85025 COMPLETE CBC W/AUTO DIFF WBC: CPT | Performed by: NURSE PRACTITIONER

## 2020-07-27 PROCEDURE — 99214 OFFICE O/P EST MOD 30 MIN: CPT | Performed by: PHYSICIAN ASSISTANT

## 2020-07-27 PROCEDURE — 84443 ASSAY THYROID STIM HORMONE: CPT | Performed by: NURSE PRACTITIONER

## 2020-07-27 PROCEDURE — 84144 ASSAY OF PROGESTERONE: CPT | Performed by: NURSE PRACTITIONER

## 2020-07-27 PROCEDURE — 82306 VITAMIN D 25 HYDROXY: CPT | Performed by: NURSE PRACTITIONER

## 2020-07-27 PROCEDURE — 82672 ASSAY OF ESTROGEN: CPT | Performed by: NURSE PRACTITIONER

## 2020-07-27 PROCEDURE — 83001 ASSAY OF GONADOTROPIN (FSH): CPT | Performed by: NURSE PRACTITIONER

## 2020-07-27 PROCEDURE — 82607 VITAMIN B-12: CPT | Performed by: NURSE PRACTITIONER

## 2020-07-28 ENCOUNTER — TELEPHONE (OUTPATIENT)
Dept: FAMILY MEDICINE CLINIC | Facility: CLINIC | Age: 30
End: 2020-07-28

## 2020-07-28 LAB
25(OH)D3 SERPL-MCNC: 37 NG/ML (ref 30–100)
ALBUMIN SERPL-MCNC: 4.8 G/DL (ref 3.5–5.2)
ALBUMIN/GLOB SERPL: 1.7 G/DL
ALP SERPL-CCNC: 68 U/L (ref 39–117)
ALT SERPL W P-5'-P-CCNC: 21 U/L (ref 1–33)
ANION GAP SERPL CALCULATED.3IONS-SCNC: 10.3 MMOL/L (ref 5–15)
AST SERPL-CCNC: 18 U/L (ref 1–32)
BASOPHILS # BLD AUTO: 0.07 10*3/MM3 (ref 0–0.2)
BASOPHILS NFR BLD AUTO: 0.8 % (ref 0–1.5)
BILIRUB SERPL-MCNC: 0.2 MG/DL (ref 0–1.2)
BUN SERPL-MCNC: 7 MG/DL (ref 6–20)
BUN/CREAT SERPL: 11.3 (ref 7–25)
CALCIUM SPEC-SCNC: 9.6 MG/DL (ref 8.6–10.5)
CHLORIDE SERPL-SCNC: 104 MMOL/L (ref 98–107)
CHOLEST SERPL-MCNC: 155 MG/DL (ref 0–200)
CO2 SERPL-SCNC: 25.7 MMOL/L (ref 22–29)
CREAT SERPL-MCNC: 0.62 MG/DL (ref 0.57–1)
DEPRECATED RDW RBC AUTO: 42.7 FL (ref 37–54)
EOSINOPHIL # BLD AUTO: 0.4 10*3/MM3 (ref 0–0.4)
EOSINOPHIL NFR BLD AUTO: 4.6 % (ref 0.3–6.2)
ERYTHROCYTE [DISTWIDTH] IN BLOOD BY AUTOMATED COUNT: 12.5 % (ref 12.3–15.4)
FSH SERPL-ACNC: 5.84 MIU/ML
GFR SERPL CREATININE-BSD FRML MDRD: 112 ML/MIN/1.73
GLOBULIN UR ELPH-MCNC: 2.9 GM/DL
GLUCOSE SERPL-MCNC: 73 MG/DL (ref 65–99)
HBA1C MFR BLD: 5.41 % (ref 4.8–5.6)
HCT VFR BLD AUTO: 43.3 % (ref 34–46.6)
HDLC SERPL-MCNC: 38 MG/DL (ref 40–60)
HGB BLD-MCNC: 14.6 G/DL (ref 12–15.9)
IMM GRANULOCYTES # BLD AUTO: 0.01 10*3/MM3 (ref 0–0.05)
IMM GRANULOCYTES NFR BLD AUTO: 0.1 % (ref 0–0.5)
LDLC SERPL CALC-MCNC: 90 MG/DL (ref 0–100)
LDLC/HDLC SERPL: 2.36 {RATIO}
LH SERPL-ACNC: 4.36 MIU/ML
LYMPHOCYTES # BLD AUTO: 2.84 10*3/MM3 (ref 0.7–3.1)
LYMPHOCYTES NFR BLD AUTO: 32.5 % (ref 19.6–45.3)
MCH RBC QN AUTO: 31.3 PG (ref 26.6–33)
MCHC RBC AUTO-ENTMCNC: 33.7 G/DL (ref 31.5–35.7)
MCV RBC AUTO: 92.9 FL (ref 79–97)
MONOCYTES # BLD AUTO: 0.56 10*3/MM3 (ref 0.1–0.9)
MONOCYTES NFR BLD AUTO: 6.4 % (ref 5–12)
NEUTROPHILS NFR BLD AUTO: 4.87 10*3/MM3 (ref 1.7–7)
NEUTROPHILS NFR BLD AUTO: 55.6 % (ref 42.7–76)
NRBC BLD AUTO-RTO: 0 /100 WBC (ref 0–0.2)
PLATELET # BLD AUTO: 347 10*3/MM3 (ref 140–450)
PMV BLD AUTO: 10.4 FL (ref 6–12)
POTASSIUM SERPL-SCNC: 4.3 MMOL/L (ref 3.5–5.2)
PROGEST SERPL-MCNC: 1.5 NG/ML
PROT SERPL-MCNC: 7.7 G/DL (ref 6–8.5)
RBC # BLD AUTO: 4.66 10*6/MM3 (ref 3.77–5.28)
SODIUM SERPL-SCNC: 140 MMOL/L (ref 136–145)
TESTOST SERPL-MCNC: 17.1 NG/DL (ref 8.4–48.1)
TRIGL SERPL-MCNC: 137 MG/DL (ref 0–150)
TSH SERPL DL<=0.05 MIU/L-ACNC: 0.51 UIU/ML (ref 0.27–4.2)
VIT B12 BLD-MCNC: 615 PG/ML (ref 211–946)
VLDLC SERPL-MCNC: 27.4 MG/DL (ref 5–40)
WBC # BLD AUTO: 8.75 10*3/MM3 (ref 3.4–10.8)

## 2020-07-28 NOTE — TELEPHONE ENCOUNTER
----- Message from RIANA Samuel sent at 7/28/2020  6:20 AM EDT -----  Relatively normal, please notify patient      Spoke to [place] and gave her results

## 2020-08-01 LAB — ESTROGEN SERPL-MCNC: 76 PG/ML

## 2020-08-10 DIAGNOSIS — K59.04 FUNCTIONAL CONSTIPATION: ICD-10-CM

## 2020-08-10 DIAGNOSIS — K62.5 RECTAL BLEEDING: ICD-10-CM

## 2020-08-10 DIAGNOSIS — Z01.818 PREOP TESTING: Primary | ICD-10-CM

## 2020-08-10 DIAGNOSIS — K58.1 IRRITABLE BOWEL SYNDROME WITH CONSTIPATION: ICD-10-CM

## 2020-08-10 DIAGNOSIS — R10.12 LUQ ABDOMINAL PAIN: ICD-10-CM

## 2020-08-10 DIAGNOSIS — R11.0 NAUSEA: ICD-10-CM

## 2020-08-10 DIAGNOSIS — R10.13 EPIGASTRIC PAIN: ICD-10-CM

## 2020-08-10 DIAGNOSIS — K21.9 GASTROESOPHAGEAL REFLUX DISEASE, ESOPHAGITIS PRESENCE NOT SPECIFIED: ICD-10-CM

## 2020-08-17 ENCOUNTER — PRIOR AUTHORIZATION (OUTPATIENT)
Dept: FAMILY MEDICINE CLINIC | Facility: CLINIC | Age: 30
End: 2020-08-17

## 2020-08-17 PROBLEM — R10.12 LUQ ABDOMINAL PAIN: Status: ACTIVE | Noted: 2020-08-17

## 2020-08-17 PROBLEM — K58.1 IRRITABLE BOWEL SYNDROME WITH CONSTIPATION: Status: ACTIVE | Noted: 2020-08-17

## 2020-08-17 PROBLEM — K21.9 GASTROESOPHAGEAL REFLUX DISEASE: Status: ACTIVE | Noted: 2020-08-17

## 2020-08-26 ENCOUNTER — PROCEDURE VISIT (OUTPATIENT)
Dept: FAMILY MEDICINE CLINIC | Facility: CLINIC | Age: 30
End: 2020-08-26

## 2020-08-26 VITALS
HEART RATE: 85 BPM | WEIGHT: 136 LBS | OXYGEN SATURATION: 97 % | DIASTOLIC BLOOD PRESSURE: 70 MMHG | SYSTOLIC BLOOD PRESSURE: 110 MMHG | TEMPERATURE: 97.8 F | HEIGHT: 62 IN | BODY MASS INDEX: 25.03 KG/M2

## 2020-08-26 DIAGNOSIS — Z72.0 TOBACCO ABUSE: Primary | ICD-10-CM

## 2020-08-26 DIAGNOSIS — M54.41 CHRONIC MIDLINE LOW BACK PAIN WITH RIGHT-SIDED SCIATICA: ICD-10-CM

## 2020-08-26 DIAGNOSIS — N80.9 ENDOMETRIOSIS: ICD-10-CM

## 2020-08-26 DIAGNOSIS — J44.9 COPD MIXED TYPE (HCC): ICD-10-CM

## 2020-08-26 DIAGNOSIS — G89.29 CHRONIC MIDLINE LOW BACK PAIN WITH RIGHT-SIDED SCIATICA: ICD-10-CM

## 2020-08-26 PROCEDURE — 99407 BEHAV CHNG SMOKING > 10 MIN: CPT | Performed by: NURSE PRACTITIONER

## 2020-08-26 PROCEDURE — 99214 OFFICE O/P EST MOD 30 MIN: CPT | Performed by: NURSE PRACTITIONER

## 2020-08-26 RX ORDER — NICOTINE 21 MG/24HR
1 PATCH, TRANSDERMAL 24 HOURS TRANSDERMAL EVERY 24 HOURS
Qty: 30 EACH | Refills: 1 | Status: SHIPPED | OUTPATIENT
Start: 2020-08-26 | End: 2021-04-06

## 2020-08-26 RX ORDER — NICOTINE 21 MG/24HR
1 PATCH, TRANSDERMAL 24 HOURS TRANSDERMAL EVERY 24 HOURS
Qty: 14 PATCH | Refills: 0 | Status: SHIPPED | OUTPATIENT
Start: 2020-08-26 | End: 2021-04-06

## 2020-08-26 NOTE — ASSESSMENT & PLAN NOTE
Smoke cessation education provided.  We have established a quit date. Reviewed the adverse effects of smoking. Discussed community programs as well as medical options to assist with cessation. Total time today spent on smoke cessation education 11 minutes. We have discussed the risk versus benefits of nicotine patches, Wellbutrin and Chantix.  We have discussed methods to distract from the habit of having a cigarette in hand such as carrying a strong chewing gum.  Individualized plan of care has been developed.   .Nicotine patches.

## 2020-08-26 NOTE — PROGRESS NOTES
Subjective   Elisabeth Hall is a 30 y.o. female.     No chief complaint on file.    Chief complaint  GERD  Joint pain  Lab review    History of Present Illness:    endometriosis - has hysterectomy planned for September 4, 2020 .    GERD/weight loss/abdominal pain-colonoscopy and egd planned Monday. Under care of GI.  Chronic constipation.  Taking 4 doses of MiraLAX and Linzess once daily.    Low back and right hip pain-having CT this week of her right hip. Is under the care chiropractor .  Has seen Ortho and a hip replacement is recommended.  Pain is worse with activity.  Does have imaging on file.    Recent labs for review.  Hemoglobin A1c normal, vitamin D normal, TSH normal, CMP normal, CBC normal, hormones within acceptable ranges, B12 normal,.  Lipid profile normal except for mildly low HDL.    Tobacco abuse-patient is tried to quit smoking several times.  The last time she quit smoking as she felt as if her entire personality changed and she did not like herself and her family did not like her either.  Patient is scheduled for multiple procedures and surgeries in the upcoming weeks.  She would like to start working on smoke cessation with hopes of being able to quit to aid in wound healing.                The following portions of the patient's history and ROS were reviewed and updated as appropriate per provider:  Allergies, current medications, past family history, past medical history, past social history, past surgical history and problem list.    Review of Systems   Constitutional: Negative for chills, fatigue and fever.   HENT: Negative for congestion, sinus pressure, sinus pain, sneezing, sore throat and trouble swallowing.    Eyes: Negative.    Respiratory: Positive for cough (Chronic) and shortness of breath (With exertion). Negative for chest tightness and wheezing.    Cardiovascular: Negative for chest pain, palpitations and leg swelling.   Gastrointestinal: Positive for abdominal pain, constipation  "and nausea. Negative for vomiting.   Endocrine: Negative.    Genitourinary: Negative for difficulty urinating, dysuria and frequency.   Musculoskeletal: Positive for arthralgias, back pain, gait problem and neck pain. Negative for neck stiffness.   Skin: Negative.    Allergic/Immunologic: Positive for environmental allergies. Negative for food allergies and immunocompromised state.   Neurological: Negative for seizures, facial asymmetry and speech difficulty.   Hematological: Negative.    Psychiatric/Behavioral: Negative for decreased concentration, self-injury and suicidal ideas. The patient is not nervous/anxious.        Objective     /70   Pulse 85   Temp 97.8 °F (36.6 °C) (Temporal)   Ht 157.5 cm (62\")   Wt 61.7 kg (136 lb)   SpO2 97%   BMI 24.87 kg/m²   Office Visit on 07/27/2020   Component Date Value Ref Range Status   • Testosterone, Total 07/27/2020 17.10  8.40 - 48.10 ng/dL Final   • Progesterone 07/27/2020 1.50  ng/mL Final   • Estrogen 07/27/2020 76  pg/mL Final   • FSH 07/27/2020 5.84  mIU/mL Final   • LH 07/27/2020 4.36  mIU/mL Final   • Vitamin B-12 07/27/2020 615  211 - 946 pg/mL Final   • Total Cholesterol 07/27/2020 155  0 - 200 mg/dL Final   • Triglycerides 07/27/2020 137  0 - 150 mg/dL Final   • HDL Cholesterol 07/27/2020 38* 40 - 60 mg/dL Final   • LDL Cholesterol  07/27/2020 90  0 - 100 mg/dL Final   • VLDL Cholesterol 07/27/2020 27.4  5 - 40 mg/dL Final   • LDL/HDL Ratio 07/27/2020 2.36   Final   • 25 Hydroxy, Vitamin D 07/27/2020 37.0  30.0 - 100.0 ng/ml Final   • Hemoglobin A1C 07/27/2020 5.41  4.80 - 5.60 % Final   • TSH 07/27/2020 0.507  0.270 - 4.200 uIU/mL Final   • Glucose 07/27/2020 73  65 - 99 mg/dL Final   • BUN 07/27/2020 7  6 - 20 mg/dL Final   • Creatinine 07/27/2020 0.62  0.57 - 1.00 mg/dL Final   • Sodium 07/27/2020 140  136 - 145 mmol/L Final   • Potassium 07/27/2020 4.3  3.5 - 5.2 mmol/L Final   • Chloride 07/27/2020 104  98 - 107 mmol/L Final   • CO2 07/27/2020 " 25.7  22.0 - 29.0 mmol/L Final   • Calcium 07/27/2020 9.6  8.6 - 10.5 mg/dL Final   • Total Protein 07/27/2020 7.7  6.0 - 8.5 g/dL Final   • Albumin 07/27/2020 4.80  3.50 - 5.20 g/dL Final   • ALT (SGPT) 07/27/2020 21  1 - 33 U/L Final   • AST (SGOT) 07/27/2020 18  1 - 32 U/L Final   • Alkaline Phosphatase 07/27/2020 68  39 - 117 U/L Final   • Total Bilirubin 07/27/2020 0.2  0.0 - 1.2 mg/dL Final   • eGFR Non African Amer 07/27/2020 112  >60 mL/min/1.73 Final   • Globulin 07/27/2020 2.9  gm/dL Final   • A/G Ratio 07/27/2020 1.7  g/dL Final   • BUN/Creatinine Ratio 07/27/2020 11.3  7.0 - 25.0 Final   • Anion Gap 07/27/2020 10.3  5.0 - 15.0 mmol/L Final   • WBC 07/27/2020 8.75  3.40 - 10.80 10*3/mm3 Final   • RBC 07/27/2020 4.66  3.77 - 5.28 10*6/mm3 Final   • Hemoglobin 07/27/2020 14.6  12.0 - 15.9 g/dL Final   • Hematocrit 07/27/2020 43.3  34.0 - 46.6 % Final   • MCV 07/27/2020 92.9  79.0 - 97.0 fL Final   • MCH 07/27/2020 31.3  26.6 - 33.0 pg Final   • MCHC 07/27/2020 33.7  31.5 - 35.7 g/dL Final   • RDW 07/27/2020 12.5  12.3 - 15.4 % Final   • RDW-SD 07/27/2020 42.7  37.0 - 54.0 fl Final   • MPV 07/27/2020 10.4  6.0 - 12.0 fL Final   • Platelets 07/27/2020 347  140 - 450 10*3/mm3 Final   • Neutrophil % 07/27/2020 55.6  42.7 - 76.0 % Final   • Lymphocyte % 07/27/2020 32.5  19.6 - 45.3 % Final   • Monocyte % 07/27/2020 6.4  5.0 - 12.0 % Final   • Eosinophil % 07/27/2020 4.6  0.3 - 6.2 % Final   • Basophil % 07/27/2020 0.8  0.0 - 1.5 % Final   • Immature Grans % 07/27/2020 0.1  0.0 - 0.5 % Final   • Neutrophils, Absolute 07/27/2020 4.87  1.70 - 7.00 10*3/mm3 Final   • Lymphocytes, Absolute 07/27/2020 2.84  0.70 - 3.10 10*3/mm3 Final   • Monocytes, Absolute 07/27/2020 0.56  0.10 - 0.90 10*3/mm3 Final   • Eosinophils, Absolute 07/27/2020 0.40  0.00 - 0.40 10*3/mm3 Final   • Basophils, Absolute 07/27/2020 0.07  0.00 - 0.20 10*3/mm3 Final   • Immature Grans, Absolute 07/27/2020 0.01  0.00 - 0.05 10*3/mm3 Final   • nRBC  07/27/2020 0.0  0.0 - 0.2 /100 WBC Final       Physical Exam   Constitutional: She is oriented to person, place, and time. Vital signs are normal. She appears well-developed and well-nourished. She is cooperative.  Non-toxic appearance. She does not have a sickly appearance. She does not appear ill. No distress.   HENT:   Head: Normocephalic. Hair is normal.   Right Ear: Tympanic membrane, external ear and ear canal normal.   Left Ear: Tympanic membrane, external ear and ear canal normal.   Nose: Nose normal. Right sinus exhibits no maxillary sinus tenderness and no frontal sinus tenderness. Left sinus exhibits no maxillary sinus tenderness and no frontal sinus tenderness.   Eyes: Pupils are equal, round, and reactive to light. Conjunctivae, EOM and lids are normal. Right eye exhibits no discharge. Left eye exhibits no discharge.   Neck: Normal range of motion. Neck supple. No tracheal deviation present. No thyromegaly present.   Cardiovascular: Normal rate, regular rhythm and normal heart sounds. Exam reveals no gallop and no friction rub.   No murmur heard.  Pulmonary/Chest: Effort normal and breath sounds normal. No respiratory distress. She has no wheezes. She has no rales. She exhibits no tenderness.   Abdominal: Soft. Normal appearance and bowel sounds are normal. She exhibits no distension and no mass. There is tenderness in the epigastric area. There is no rigidity, no rebound and no guarding.   Musculoskeletal: Normal range of motion.   Lymphadenopathy:     She has no cervical adenopathy.   Neurological: She is alert and oriented to person, place, and time. She has normal reflexes. She is not disoriented.   CN 2-12 grossly intact    Skin: Skin is warm, dry and intact. Capillary refill takes less than 2 seconds. No rash noted. She is not diaphoretic. No cyanosis or erythema. No pallor. Nails show no clubbing.   Psychiatric: She has a normal mood and affect. Her speech is normal and behavior is normal.  Judgment and thought content normal. Cognition and memory are normal.   Vitals reviewed.      Assessment/Plan     Problem List Items Addressed This Visit        Respiratory    COPD mixed type (CMS/HCC)    Relevant Orders    Ambulatory Referral to Pulmonology       Nervous and Auditory    Chronic midline low back pain with sciatica    Overview     Mri performed at [facility] January 11, 2019.   Multiple bulges in the lumbar spine.            Current Assessment & Plan     Body mechanics reviewed.  Avoid lifting or twisting.            Other    Tobacco abuse - Primary    Current Assessment & Plan     Smoke cessation education provided.  We have established a quit date. Reviewed the adverse effects of smoking. Discussed community programs as well as medical options to assist with cessation. Total time today spent on smoke cessation education 11 minutes. We have discussed the risk versus benefits of nicotine patches, Wellbutrin and Chantix.  We have discussed methods to distract from the habit of having a cigarette in hand such as carrying a strong chewing gum.  Individualized plan of care has been developed.   .Nicotine patches.         Relevant Medications    nicotine (NICODERM CQ) 7 MG/24HR patch    nicotine (NICODERM CQ) 14 MG/24HR patch    nicotine (NICODERM CQ) 21 MG/24HR patch    Endometriosis    Current Assessment & Plan     Discussed upcoming hysterectomy.  Emotional support provided.  Answered questions to the best of my ability.                    Patient's Body mass index is 24.87 kg/m². BMI is within normal parameters. No follow-up required..    Elisabeth Hall  reports that she has been smoking cigarettes. She has a 15.00 pack-year smoking history. She has never used smokeless tobacco.. I have educated her on the risk of diseases from using tobacco products such as cancer, COPD and heart diease.     I advised her to quit and she is willing to quit. We have discussed the following  method/s for tobacco cessation:  Education Material Counseling Prescription Medicaiton.  Together we have set a quit date for 2 weeks from today.  She will follow up with me in a few weeks or sooner to check on her progress.    I spent 11 minutes counseling the patient.       I have discussed diagnosis in detail today allowing time for questions and answers. Patient is aware of reasons to seek urgent or emergent medical care as well as reasons to return to the clinic for evaluation. Possible side effects, interactions and progression of symptoms discussed as well. Patient / family states understanding.   Emotional support and active listening provided.     Follow up in 1-2 months. Routine labs fasting one week prior to next office visit. Return sooner if needed.           This document has been electronically signed by:  RIANA Tellez, NP-C

## 2020-08-26 NOTE — ASSESSMENT & PLAN NOTE
Discussed upcoming hysterectomy.  Emotional support provided.  Answered questions to the best of my ability.

## 2020-08-28 ENCOUNTER — LAB (OUTPATIENT)
Dept: LAB | Facility: HOSPITAL | Age: 30
End: 2020-08-28

## 2020-08-28 DIAGNOSIS — K21.9 GASTROESOPHAGEAL REFLUX DISEASE, ESOPHAGITIS PRESENCE NOT SPECIFIED: ICD-10-CM

## 2020-08-28 DIAGNOSIS — K58.1 IRRITABLE BOWEL SYNDROME WITH CONSTIPATION: ICD-10-CM

## 2020-08-28 DIAGNOSIS — R10.13 EPIGASTRIC PAIN: ICD-10-CM

## 2020-08-28 DIAGNOSIS — R10.12 LUQ ABDOMINAL PAIN: ICD-10-CM

## 2020-08-28 DIAGNOSIS — K59.04 FUNCTIONAL CONSTIPATION: ICD-10-CM

## 2020-08-28 DIAGNOSIS — R11.0 NAUSEA: ICD-10-CM

## 2020-08-28 DIAGNOSIS — K62.5 RECTAL BLEEDING: ICD-10-CM

## 2020-08-28 DIAGNOSIS — Z01.818 PREOP TESTING: ICD-10-CM

## 2020-08-28 PROCEDURE — U0004 COV-19 TEST NON-CDC HGH THRU: HCPCS

## 2020-08-28 PROCEDURE — U0002 COVID-19 LAB TEST NON-CDC: HCPCS

## 2020-08-28 PROCEDURE — C9803 HOPD COVID-19 SPEC COLLECT: HCPCS

## 2020-08-29 LAB
REF LAB TEST METHOD: NORMAL
SARS-COV-2 RNA RESP QL NAA+PROBE: NOT DETECTED

## 2020-08-31 ENCOUNTER — HOSPITAL ENCOUNTER (OUTPATIENT)
Facility: HOSPITAL | Age: 30
Setting detail: HOSPITAL OUTPATIENT SURGERY
Discharge: HOME | End: 2020-08-31
Attending: INTERNAL MEDICINE | Admitting: INTERNAL MEDICINE

## 2020-08-31 ENCOUNTER — ANESTHESIA EVENT (OUTPATIENT)
Dept: PERIOP | Facility: HOSPITAL | Age: 30
End: 2020-08-31

## 2020-08-31 ENCOUNTER — ANESTHESIA (OUTPATIENT)
Dept: PERIOP | Facility: HOSPITAL | Age: 30
End: 2020-08-31

## 2020-08-31 VITALS
RESPIRATION RATE: 18 BRPM | HEART RATE: 68 BPM | SYSTOLIC BLOOD PRESSURE: 91 MMHG | DIASTOLIC BLOOD PRESSURE: 61 MMHG | WEIGHT: 136 LBS | HEIGHT: 60 IN | BODY MASS INDEX: 26.7 KG/M2 | OXYGEN SATURATION: 97 % | TEMPERATURE: 98.8 F

## 2020-08-31 DIAGNOSIS — R10.12 LUQ ABDOMINAL PAIN: ICD-10-CM

## 2020-08-31 DIAGNOSIS — K58.1 IRRITABLE BOWEL SYNDROME WITH CONSTIPATION: ICD-10-CM

## 2020-08-31 DIAGNOSIS — K21.9 GASTROESOPHAGEAL REFLUX DISEASE, ESOPHAGITIS PRESENCE NOT SPECIFIED: ICD-10-CM

## 2020-08-31 DIAGNOSIS — R11.0 NAUSEA: ICD-10-CM

## 2020-08-31 PROCEDURE — 25010000002 PROPOFOL 10 MG/ML EMULSION: Performed by: NURSE ANESTHETIST, CERTIFIED REGISTERED

## 2020-08-31 PROCEDURE — 43239 EGD BIOPSY SINGLE/MULTIPLE: CPT | Performed by: INTERNAL MEDICINE

## 2020-08-31 PROCEDURE — 45385 COLONOSCOPY W/LESION REMOVAL: CPT | Performed by: INTERNAL MEDICINE

## 2020-08-31 RX ORDER — MEPERIDINE HYDROCHLORIDE 25 MG/ML
12.5 INJECTION INTRAMUSCULAR; INTRAVENOUS; SUBCUTANEOUS
Status: ACTIVE | OUTPATIENT
Start: 2020-08-31 | End: 2020-09-01

## 2020-08-31 RX ORDER — FENTANYL CITRATE 50 UG/ML
50 INJECTION, SOLUTION INTRAMUSCULAR; INTRAVENOUS
Status: DISCONTINUED | OUTPATIENT
Start: 2020-08-31 | End: 2020-09-14

## 2020-08-31 RX ORDER — LIDOCAINE HYDROCHLORIDE 20 MG/ML
INJECTION, SOLUTION INFILTRATION; PERINEURAL AS NEEDED
Status: DISCONTINUED | OUTPATIENT
Start: 2020-08-31 | End: 2020-08-31 | Stop reason: SURG

## 2020-08-31 RX ORDER — IPRATROPIUM BROMIDE AND ALBUTEROL SULFATE 2.5; .5 MG/3ML; MG/3ML
3 SOLUTION RESPIRATORY (INHALATION) ONCE AS NEEDED
Status: DISCONTINUED | OUTPATIENT
Start: 2020-08-31 | End: 2020-09-14

## 2020-08-31 RX ORDER — SODIUM CHLORIDE 0.9 % (FLUSH) 0.9 %
10 SYRINGE (ML) INJECTION AS NEEDED
Status: DISCONTINUED | OUTPATIENT
Start: 2020-08-31 | End: 2020-08-31 | Stop reason: HOSPADM

## 2020-08-31 RX ORDER — ONDANSETRON 2 MG/ML
4 INJECTION INTRAMUSCULAR; INTRAVENOUS AS NEEDED
Status: DISCONTINUED | OUTPATIENT
Start: 2020-08-31 | End: 2020-09-14

## 2020-08-31 RX ORDER — SODIUM CHLORIDE 0.9 % (FLUSH) 0.9 %
10 SYRINGE (ML) INJECTION EVERY 12 HOURS SCHEDULED
Status: DISCONTINUED | OUTPATIENT
Start: 2020-08-31 | End: 2020-08-31 | Stop reason: HOSPADM

## 2020-08-31 RX ORDER — SODIUM CHLORIDE, SODIUM LACTATE, POTASSIUM CHLORIDE, CALCIUM CHLORIDE 600; 310; 30; 20 MG/100ML; MG/100ML; MG/100ML; MG/100ML
125 INJECTION, SOLUTION INTRAVENOUS CONTINUOUS
Status: DISCONTINUED | OUTPATIENT
Start: 2020-08-31 | End: 2020-08-31 | Stop reason: HOSPADM

## 2020-08-31 RX ORDER — OXYCODONE HYDROCHLORIDE AND ACETAMINOPHEN 5; 325 MG/1; MG/1
1 TABLET ORAL ONCE AS NEEDED
Status: DISCONTINUED | OUTPATIENT
Start: 2020-08-31 | End: 2020-09-14

## 2020-08-31 RX ORDER — PROPOFOL 10 MG/ML
VIAL (ML) INTRAVENOUS AS NEEDED
Status: DISCONTINUED | OUTPATIENT
Start: 2020-08-31 | End: 2020-08-31 | Stop reason: SURG

## 2020-08-31 RX ORDER — MIDAZOLAM HYDROCHLORIDE 1 MG/ML
1 INJECTION INTRAMUSCULAR; INTRAVENOUS
Status: DISCONTINUED | OUTPATIENT
Start: 2020-08-31 | End: 2020-08-31 | Stop reason: HOSPADM

## 2020-08-31 RX ADMIN — PROPOFOL 30 MG: 10 INJECTION, EMULSION INTRAVENOUS at 10:31

## 2020-08-31 RX ADMIN — SODIUM CHLORIDE, POTASSIUM CHLORIDE, SODIUM LACTATE AND CALCIUM CHLORIDE 125 ML/HR: 600; 310; 30; 20 INJECTION, SOLUTION INTRAVENOUS at 09:22

## 2020-08-31 RX ADMIN — PROPOFOL 150 MCG/KG/MIN: 10 INJECTION, EMULSION INTRAVENOUS at 10:31

## 2020-08-31 RX ADMIN — LIDOCAINE HYDROCHLORIDE 40 MG: 20 INJECTION, SOLUTION INFILTRATION; PERINEURAL at 10:31

## 2020-08-31 NOTE — ANESTHESIA POSTPROCEDURE EVALUATION
Patient: Elisabeth Hall    Procedure Summary     Date:  08/31/20 Room / Location:   [facility] /  [facility]    Anesthesia Start:  1023 Anesthesia Stop:  1057    Procedures:       ESOPHAGOGASTRODUODENOSCOPY WITH BIOPSY CPT CODE: 80419 (N/A Esophagus)      COLONOSCOPY CPT CODE: 83985 (N/A ) Diagnosis:       LUQ abdominal pain      Irritable bowel syndrome with constipation      Nausea      Gastroesophageal reflux disease, esophagitis presence not specified      (LUQ abdominal pain [R10.12])      (Irritable bowel syndrome with constipation [K58.1])      (Nausea [R11.0])      (Gastroesophageal reflux disease, esophagitis presence not specified [K21.9])    Surgeon:  Bhupinder Orta MD Provider:  Fred Dawson MD    Anesthesia Type:  general ASA Status:  2          Anesthesia Type: general    Vitals  No vitals data found for the desired time range.          Post Anesthesia Care and Evaluation    Patient location during evaluation: PHASE II  Patient participation: complete - patient participated  Level of consciousness: awake and alert  Pain score: 0  Pain management: adequate  Airway patency: patent  Anesthetic complications: No anesthetic complications    Cardiovascular status: acceptable  Respiratory status: acceptable  Hydration status: acceptable

## 2020-08-31 NOTE — ANESTHESIA PREPROCEDURE EVALUATION
Anesthesia Evaluation     no history of anesthetic complications:  NPO Solid Status: > 8 hours  NPO Liquid Status: > 8 hours           Airway   Mallampati: II  TM distance: >3 FB  Neck ROM: full  No difficulty expected  Dental - normal exam     Pulmonary - normal exam   (+) COPD,   Cardiovascular - normal exam    (+) hypertension, hyperlipidemia,       Neuro/Psych  (+) numbness,     GI/Hepatic/Renal/Endo    (+) obesity,  GERD,  liver disease fatty liver disease,     Musculoskeletal     Abdominal  - normal exam   Substance History      OB/GYN          Other                        Anesthesia Plan    ASA 2     general     intravenous induction     Anesthetic plan, all risks, benefits, and alternatives have been provided, discussed and informed consent has been obtained with: patient.

## 2020-09-02 LAB
LAB AP CASE REPORT: NORMAL
PATH REPORT.FINAL DX SPEC: NORMAL

## 2020-09-14 ENCOUNTER — TELEMEDICINE (OUTPATIENT)
Dept: GASTROENTEROLOGY | Facility: CLINIC | Age: 30
End: 2020-09-14

## 2020-09-14 DIAGNOSIS — R19.7 DIARRHEA, UNSPECIFIED TYPE: Primary | ICD-10-CM

## 2020-09-14 DIAGNOSIS — R10.9 ABDOMINAL CRAMPING: ICD-10-CM

## 2020-09-14 DIAGNOSIS — R11.0 NAUSEA: ICD-10-CM

## 2020-09-14 DIAGNOSIS — K21.9 GASTROESOPHAGEAL REFLUX DISEASE WITHOUT ESOPHAGITIS: ICD-10-CM

## 2020-09-14 DIAGNOSIS — K44.9 HIATAL HERNIA: ICD-10-CM

## 2020-09-14 PROCEDURE — 99214 OFFICE O/P EST MOD 30 MIN: CPT | Performed by: PHYSICIAN ASSISTANT

## 2020-09-14 RX ORDER — NITROFURANTOIN 25; 75 MG/1; MG/1
100 CAPSULE ORAL 2 TIMES DAILY
COMMUNITY
End: 2020-12-28

## 2020-09-14 RX ORDER — PROMETHAZINE HYDROCHLORIDE 12.5 MG/1
12.5 TABLET ORAL EVERY 6 HOURS PRN
Qty: 30 TABLET | Refills: 0 | Status: SHIPPED | OUTPATIENT
Start: 2020-09-14 | End: 2021-04-07

## 2020-09-14 NOTE — PROGRESS NOTES
: 1990    Chief Complaint   Patient presents with   • Results     EGD and colonoscopy     You have chosen to receive care through a telehealth visit.  Do you consent to use a video/audio connection for your medical care today? Yes    Elisabeth Hall is a 30 y.o. female who presents to the office today as a follow up appointment regarding Results (EGD and colonoscopy).    History of Present Illness:  She would like to discuss results of her recent procedures.  She recently had a hysterectomy and then a postop infection and is now taking antibiotics.  She has noticed since starting the antibiotic that she continues to have nausea and diarrhea with lower abdominal cramping.  She reported to the ED with these complaints when they started.  She was told that on a CT scan of her abdomen and pelvis, she had dilatation of her bowels.  She does feel some better over the past 3 to 4 days.  [facility] CT scan was [facility] at [facility].  He discontinued Linzess and MiraLAX due to the symptoms.  She continues to have diarrhea several times daily.  Abdominal pain is severe when having a bowel movement in the bilateral lower quadrants, described as cramping.  Denies any GERD symptoms or heartburn.    Review of Systems   Constitutional: Positive for appetite change and unexpected weight change. Negative for chills, fatigue and fever.   HENT: Negative for trouble swallowing.    Eyes: Negative.    Respiratory: Negative for cough, choking, chest tightness and shortness of breath.    Cardiovascular: Negative for chest pain.   Gastrointestinal: Positive for abdominal pain, diarrhea and nausea. Negative for abdominal distention, anal bleeding, blood in stool, constipation and vomiting.   Endocrine: Negative.    Genitourinary: Positive for dysuria. Negative for difficulty urinating.   Musculoskeletal: Positive for back pain. Negative for neck pain.   Skin: Negative.    Allergic/Immunologic: Negative for environmental  allergies and food allergies.   Neurological: Positive for dizziness. Negative for light-headedness and headaches.   Hematological: Does not bruise/bleed easily.   Psychiatric/Behavioral: Negative.          Past Medical History:   Diagnosis Date   • Anemia 2017   • Arthritis 2009   • Constipation    • COPD (chronic obstructive pulmonary disease) (CMS/HCC)    • Esophagitis, reflux    • Fatty liver 2/10/2020    [facility]   • GERD (gastroesophageal reflux disease) 2009   • Hypertension    • Irritable bowel    • Lumbago    • Memory loss    • Restless legs        Past Surgical History:   Procedure Laterality Date   • ANKLE SURGERY     • CHOLECYSTECTOMY     • COLONOSCOPY  2011   • COLONOSCOPY N/A 8/31/2020    Procedure: COLONOSCOPY CPT CODE: 35979;  Surgeon: Bhupinder Orta MD;  Location: [facility] OR;  Service: Gastroenterology;  Laterality: N/A;   • ENDOSCOPY N/A 8/31/2020    Procedure: ESOPHAGOGASTRODUODENOSCOPY WITH BIOPSY CPT CODE: 98352;  Surgeon: Bhupinder Orta MD;  Location: [facility] OR;  Service: Gastroenterology;  Laterality: N/A;   • TUBAL ABDOMINAL LIGATION     • UPPER GASTROINTESTINAL ENDOSCOPY  02/27/2020    [facility]       Family History   Problem Relation Age of Onset   • Diabetes Mother    • Hypertension Mother    • Arthritis Father    • Diabetes Father    • Birth defects Sister    • Kidney disease Sister    • Asthma Brother    • Diabetes Brother    • Hypertension Brother    • Thyroid disease Brother    • COPD Maternal Uncle    • Cancer Maternal Uncle         Bladder   • Colon cancer Maternal Uncle    • Arthritis Maternal Grandmother    • Cancer Maternal Grandmother         Kidney and liver   • Diabetes Maternal Grandmother    • Hypertension Maternal Grandmother    • Liver disease Maternal Grandmother    • Cirrhosis Maternal Grandmother    • Liver cancer Maternal Grandmother    • Breast cancer Maternal Aunt    • Cancer Maternal Uncle         Lung and bone   • Cancer  Paternal Grandmother         Colon   • Colon cancer Paternal Grandmother    • Cirrhosis Maternal Grandfather        Social History     Socioeconomic History   • Marital status:      Spouse name: betty   • Number of children: 1   • Years of education: 12   • Highest education level: Not on file   Occupational History   • 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   Social Needs   • Financial resource strain: Not hard at all   • Food insecurity     Worry: Never true     Inability: Never true   • Transportation needs     Medical: No     Non-medical: No   Tobacco Use   • Smoking status: Current Every Day Smoker     Packs/day: 1.00     Years: 15.00     Pack years: 15.00     Types: Cigarettes   • Smokeless tobacco: Never Used   Substance and Sexual Activity   • Alcohol use: No   • Drug use: No   • Sexual activity: Yes     Partners: Male     Birth control/protection: Surgical   Lifestyle   • Physical activity     Days per week: 7 days     Minutes per session: 30 min   • Stress: Not at all       Current Outpatient Medications:   •  cyclobenzaprine (FLEXERIL) 5 MG tablet, Take 1 tablet by mouth 3 (Three) Times a Day As Needed for Muscle Spasms. for muscle spams, Disp: 90 tablet, Rfl: 5  •  Fluticasone Furoate-Vilanterol (Breo Ellipta) 100-25 MCG/INH inhaler, Inhale 1 puff Daily., Disp: 1 inhaler, Rfl: 5  •  lansoprazole (PREVACID SOLUTAB) 30 MG Tablet Delayed Release Dispersible disintegrating tablet, Take 1 tablet by mouth Daily., Disp: 30 tablet, Rfl: 5  •  metoprolol succinate XL (TOPROL-XL) 25 MG 24 hr tablet, Take 1 tablet by mouth Daily., Disp: 30 tablet, Rfl: 5  •  nitrofurantoin, macrocrystal-monohydrate, (MACROBID) 100 MG capsule, Take 100 mg by mouth 2 (Two) Times a Day., Disp: , Rfl:   •  linaclotide (LINZESS) 290 MCG capsule capsule, Take 1 capsule by mouth Every Morning Before Breakfast., Disp: 30 capsule, Rfl: 5  •  nicotine (NICODERM CQ) 14 MG/24HR patch, Place 1 patch on the skin as directed by provider Daily., Disp:  14 patch, Rfl: 0  •  nicotine (NICODERM CQ) 21 MG/24HR patch, Place 1 patch on the skin as directed by provider Daily., Disp: 30 each, Rfl: 1  •  nicotine (NICODERM CQ) 7 MG/24HR patch, Place 1 patch on the skin as directed by provider Daily., Disp: 14 each, Rfl: 0  •  polyethylene glycol (MIRALAX) 17 GM/SCOOP powder, Take 68 g by mouth Daily. Mix with 16 oz liquid, Disp: 510 g, Rfl: 5  •  promethazine (PHENERGAN) 12.5 MG tablet, Take 1 tablet by mouth Every 6 (Six) Hours As Needed for Nausea or Vomiting., Disp: 30 tablet, Rfl: 0  No current facility-administered medications for this visit.     Allergies:   Azithromycin    Vitals:  LMP 08/25/2020     Physical Exam  Constitutional:       General: She is not in acute distress.     Appearance: Normal appearance. She is well-developed.   HENT:      Head: Normocephalic and atraumatic.      Nose: Nose normal.   Pulmonary:      Effort: Pulmonary effort is normal. No respiratory distress.   Neurological:      Mental Status: She is alert and oriented to person, place, and time.   Psychiatric:         Mood and Affect: Mood normal.         Behavior: Behavior normal.         Thought Content: Thought content normal.         Judgment: Judgment normal.     Results Review:  EGD and colonoscopy were completed on 8/31/2020 by Dr. Orta.  Findings were normal esophagus, normal stomach except for small sliding hiatal hernia, normal duodenum, 7 mm rectal polyp.  Pathology showed rectal polyp to be a tubular adenoma, otherwise benign biopsies.    Assessment:  1. Diarrhea, unspecified type    2. Abdominal cramping    3. Gastroesophageal reflux disease without esophagitis    4. Hiatal hernia    5. Nausea      Plan:  Due to her current complaints, I have asked her to monitor symptoms for gradual improvement when antibiotics are completed.  She will resume constipation medications when diarrhea improves.  For nausea, she can take Phenergan as needed.  She is likely having the symptoms  related to her recent surgery and current antibiotic regimen along with recent infection.  Discussed recent procedure results in detail.  Due to finding of adenomatous rectal polyp, repeat colorectal cancer screening colonoscopy was recommended in 3 years.  She will call the office if symptoms do not improve within the next 1 to 2 weeks for another follow-up.    New Medications Ordered This Visit   Medications   • promethazine (PHENERGAN) 12.5 MG tablet     Sig: Take 1 tablet by mouth Every 6 (Six) Hours As Needed for Nausea or Vomiting.     Dispense:  30 tablet     Refill:  0         This was an audio and video enabled telemedicine encounter.    Return in about 1 month (around 10/14/2020) for recheck abdominal pain.      Electronically signed 9/15/2020 12:34 KATHERINET  Yue Castro PA-C  [facility]

## 2020-12-03 ENCOUNTER — OFFICE VISIT (OUTPATIENT)
Dept: FAMILY MEDICINE CLINIC | Facility: CLINIC | Age: 30
End: 2020-12-03

## 2020-12-03 VITALS
HEART RATE: 91 BPM | OXYGEN SATURATION: 99 % | TEMPERATURE: 97.3 F | WEIGHT: 135 LBS | HEIGHT: 60 IN | SYSTOLIC BLOOD PRESSURE: 110 MMHG | BODY MASS INDEX: 26.5 KG/M2 | DIASTOLIC BLOOD PRESSURE: 70 MMHG

## 2020-12-03 DIAGNOSIS — J44.9 COPD MIXED TYPE (HCC): ICD-10-CM

## 2020-12-03 DIAGNOSIS — J40 BRONCHITIS: Primary | ICD-10-CM

## 2020-12-03 DIAGNOSIS — N95.1 MENOPAUSAL SYMPTOMS: ICD-10-CM

## 2020-12-03 DIAGNOSIS — K59.04 FUNCTIONAL CONSTIPATION: ICD-10-CM

## 2020-12-03 PROBLEM — N92.0 MENORRHAGIA WITH REGULAR CYCLE: Status: RESOLVED | Noted: 2018-02-05 | Resolved: 2020-12-03

## 2020-12-03 PROCEDURE — 96372 THER/PROPH/DIAG INJ SC/IM: CPT | Performed by: NURSE PRACTITIONER

## 2020-12-03 PROCEDURE — 99214 OFFICE O/P EST MOD 30 MIN: CPT | Performed by: NURSE PRACTITIONER

## 2020-12-03 RX ORDER — AMOXICILLIN 875 MG/1
875 TABLET, COATED ORAL EVERY 12 HOURS SCHEDULED
Qty: 20 TABLET | Refills: 0 | Status: SHIPPED | OUTPATIENT
Start: 2020-12-03 | End: 2020-12-28

## 2020-12-03 RX ORDER — KETOROLAC TROMETHAMINE 30 MG/ML
60 INJECTION, SOLUTION INTRAMUSCULAR; INTRAVENOUS ONCE
Status: COMPLETED | OUTPATIENT
Start: 2020-12-03 | End: 2020-12-03

## 2020-12-03 RX ORDER — GUAIFENESIN/DEXTROMETHORPHAN 100-10MG/5
5 SYRUP ORAL 3 TIMES DAILY PRN
Qty: 1 EACH | Refills: 0 | Status: SHIPPED | OUTPATIENT
Start: 2020-12-03 | End: 2020-12-28

## 2020-12-03 RX ORDER — CEFTRIAXONE 1 G/1
1 INJECTION, POWDER, FOR SOLUTION INTRAMUSCULAR; INTRAVENOUS ONCE
Status: COMPLETED | OUTPATIENT
Start: 2020-12-03 | End: 2020-12-03

## 2020-12-03 RX ORDER — ESTRADIOL 0.1 MG/G
CREAM VAGINAL
Qty: 1 EACH | Refills: 12 | Status: SHIPPED | OUTPATIENT
Start: 2020-12-03

## 2020-12-03 RX ADMIN — KETOROLAC TROMETHAMINE 60 MG: 30 INJECTION, SOLUTION INTRAMUSCULAR; INTRAVENOUS at 12:30

## 2020-12-03 RX ADMIN — CEFTRIAXONE 1 G: 1 INJECTION, POWDER, FOR SOLUTION INTRAMUSCULAR; INTRAVENOUS at 12:29

## 2020-12-03 NOTE — PROGRESS NOTES
Subjective   Elisabeth Hall is a 30 y.o. female.     Chief complaint  Mild cough with chest tenderness  I think i'm having hot flashes    History of Present Illness:    Hot flash  - had partial hysterectomy in September . She has one ovary. Now has been waking up soaking with sweat, feels cold during the day. Sex is painful.     COPD with exacerbation-thinks she has bronchitis.  Some chest tenderness along her rib lines when she takes deep breath.  Frequent bronchitis.  Does seem to be worse when she is out in the cold weather.  Has a cough that is worse when cold air hits her lungs.  Continues to smoke.  Has cut back some.  Needs samples of Breo if available because she has lost her insurance.    Chronic constipation-needs samples of Linzess for constipation if available.  Has no insurance coverage at this time.      The following portions of the patient's history and ROS were reviewed and updated as appropriate per provider:  Allergies, current medications, past family history, past medical history, past social history, past surgical history and problem list.    Review of Systems   Constitutional: Positive for chills. Negative for appetite change, fatigue and fever.   HENT: Negative for congestion, sinus pressure, sinus pain, sneezing and sore throat.    Eyes: Negative.    Respiratory: Positive for cough and chest tightness. Negative for shortness of breath and wheezing.    Cardiovascular: Negative.    Gastrointestinal: Positive for constipation. Negative for abdominal pain, blood in stool, diarrhea, nausea and vomiting.   Endocrine: Positive for cold intolerance. Negative for polydipsia, polyphagia and polyuria.   Genitourinary: Negative for difficulty urinating, dysuria and flank pain.   Musculoskeletal: Positive for arthralgias and back pain. Negative for myalgias and neck pain.   Skin: Negative.    Allergic/Immunologic: Positive for environmental allergies. Negative for food allergies and immunocompromised  "state.   Neurological: Negative for dizziness, facial asymmetry and headaches.   Hematological: Negative.    Psychiatric/Behavioral: Negative for dysphoric mood, self-injury and suicidal ideas. The patient is not nervous/anxious.        Objective     /70   Pulse 91   Temp 97.3 °F (36.3 °C) (Temporal)   Ht 152.4 cm (60\")   Wt 61.2 kg (135 lb)   SpO2 99%   BMI 26.37 kg/m²   Admission on 08/31/2020, Discharged on 08/31/2020   Component Date Value Ref Range Status   • Case Report 08/31/2020    Final                    Value:Surgical Pathology Report                         Case: [id number]                                  Authorizing Provider:  Bhupinder Orta     Collected:           08/31/2020 10:39 AM                                 MD [place]                                                                    Ordering Location:     [facility]      Received:            08/31/2020 12:45 PM                                 OPERATING ROOM DEPARTMENT                                                    Pathologist:           Shauna Mendoza MD                                                       Specimens:   1) - Small Intestine, duodenum bx                                                                   2) - Gastric, Antrum, antrum bx                                                                     3) - Large Intestine, Rectum, rectal polyp                                                • Final Diagnosis 08/31/2020    Final                    Value:This result contains rich text formatting which cannot be displayed here.       Physical Exam  Vitals signs reviewed.   Constitutional:       General: She is not in acute distress.     Appearance: Normal appearance. She is well-developed, well-groomed and normal weight. She is not ill-appearing, toxic-appearing or diaphoretic.   HENT:      Head: Normocephalic and atraumatic. Hair is normal.      Right Ear: Tympanic membrane, ear canal and " external ear normal. There is no impacted cerumen.      Left Ear: Tympanic membrane, ear canal and external ear normal. There is no impacted cerumen.      Nose: Nose normal.      Right Sinus: No maxillary sinus tenderness or frontal sinus tenderness.      Left Sinus: No maxillary sinus tenderness or frontal sinus tenderness.      Comments: External nose normal     Mouth/Throat:      Lips: Pink. No lesions.      Comments: Wearing mask other than brief oral exam  Eyes:      General: Lids are normal. Vision grossly intact. Gaze aligned appropriately.         Right eye: No discharge.         Left eye: No discharge.      Conjunctiva/sclera: Conjunctivae normal.      Pupils: Pupils are equal, round, and reactive to light.   Neck:      Musculoskeletal: Normal range of motion and neck supple. Normal range of motion. No spinous process tenderness or muscular tenderness.      Thyroid: No thyromegaly.      Trachea: No tracheal deviation.   Cardiovascular:      Rate and Rhythm: Normal rate and regular rhythm.      Pulses: Normal pulses.      Heart sounds: Normal heart sounds. No murmur. No friction rub. No gallop.    Pulmonary:      Effort: Pulmonary effort is normal. No accessory muscle usage or respiratory distress.      Breath sounds: Decreased air movement present. Wheezing present. No rales.   Chest:      Chest wall: Tenderness present. There is no dullness to percussion.          Comments: Patient was not wearing her shirt during exam  Abdominal:      General: Bowel sounds are normal. There is no distension.      Palpations: Abdomen is soft. There is no mass.      Tenderness: There is no abdominal tenderness. There is no guarding or rebound.   Musculoskeletal: Normal range of motion.   Lymphadenopathy:      Cervical: No cervical adenopathy.   Skin:     General: Skin is warm and dry.      Capillary Refill: Capillary refill takes less than 2 seconds.      Coloration: Skin is not cyanotic or pale.      Findings: No erythema  or rash.      Nails: There is no clubbing.     Neurological:      General: No focal deficit present.      Mental Status: She is alert and oriented to person, place, and time.      Deep Tendon Reflexes: Reflexes are normal and symmetric.      Comments: CN 2-12 grossly intact    Psychiatric:         Attention and Perception: Attention normal.         Mood and Affect: Mood normal.         Speech: Speech normal.         Behavior: Behavior normal. Behavior is cooperative.         Thought Content: Thought content normal.         Cognition and Memory: Cognition normal.         Judgment: Judgment normal.         Assessment/Plan     Problem List Items Addressed This Visit        Respiratory    COPD mixed type (CMS/HCC)    Relevant Medications    guaiFENesin-dextromethorphan (ROBITUSSIN DM) 100-10 MG/5ML syrup    Fluticasone Furoate-Vilanterol (Breo Ellipta) 100-25 MCG/INH inhaler       Digestive    Constipation - functional    Current Assessment & Plan     Provided with samples of Linzess            Genitourinary    Menopausal symptoms    Overview     Post partial hysterectomy sept 2020         Current Assessment & Plan     No diagnosis today.  Start estrogen vaginally.  Reviewed possible side effects and interactions.  Strongly encouraged her to stop smoking due to increased risk for side effects with hormone replacement.  Patient states understanding.  Discussed risk of blood clot, stroke, MI, heart disease and hypertension.           Other Visit Diagnoses     Bronchitis    -  Primary    Stop smoking, symptomatic treatment.  3-way cough syrup.  Rocephin injection today and Toradol injection.    Relevant Medications    ketorolac (TORADOL) injection 60 mg (Completed)    cefTRIAXone (ROCEPHIN) injection 1 g (Completed)    guaiFENesin-dextromethorphan (ROBITUSSIN DM) 100-10 MG/5ML syrup    Fluticasone Furoate-Vilanterol (Breo Ellipta) 100-25 MCG/INH inhaler        I have reviewed medication list and discussed with patient the  possible side effects and interactions.  We have discussed purpose for medication, route, dosage, frequency.  Refill routine medications.    Fluids, rest, symptomatic treatment advised. Steam therapy. Dispose of tooth bush after 24 hours of starting antibiotics if ordered. Complete antibiotics as ordered.  Discussed possible side effects/interactions of medication. Discussed symptoms to report as well as reasons to seek urgent or emergent medical attention. Understanding stated.   Recommend follow up in 2-3 days if not improved, sooner if needed.            Patient's Body mass index is 26.37 kg/m². BMI is above normal parameters. Recommendations include: exercise counseling and nutrition counseling.  Patient appears in an acceptable weight for her body type/shape.  Goal would be weight maintenance.    Elisabeth Hall  reports that she has been smoking cigarettes. She has a 15.00 pack-year smoking history. She has never used smokeless tobacco.. I have educated her on the risk of diseases from using tobacco products such as cancer, COPD and heart disease.     I advised her to quit and she is not willing to quit.    I spent 1 minutes counseling the patient.    Coronavirus precautions have been reviewed and discussed.  I have discussed the CDC recommendations  of social distancing, hand washing, wearing mask and disinfecting commonly touched items. Reviewed need to notify PCP and self quarantine with mild symptoms.  Discussed procedure to obtain Covid-19 testing and notification of PCP/health dept/ED/Urgent Center if symptoms begin. Understanding verbalized.      I have discussed diagnosis in detail today allowing time for questions and answers. Patient is aware of reasons to seek urgent or emergent medical care as well as reasons to return to the clinic for evaluation. Possible side effects, interactions and progression of symptoms discussed as well. Patient / family states understanding.   Emotional support and active  listening provided.     RTC 2-5 days if not improved, sooner if condition worsens/changes. Symptomatic care advised as well as reasons for urgent or emergent care. Pt / family state understanding.         This document has been electronically signed by:  RIANA Tellez, NP-C

## 2020-12-03 NOTE — ASSESSMENT & PLAN NOTE
No diagnosis today.  Start estrogen vaginally.  Reviewed possible side effects and interactions.  Strongly encouraged her to stop smoking due to increased risk for side effects with hormone replacement.  Patient states understanding.  Discussed risk of blood clot, stroke, MI, heart disease and hypertension.

## 2020-12-28 ENCOUNTER — OFFICE VISIT (OUTPATIENT)
Dept: FAMILY MEDICINE CLINIC | Facility: CLINIC | Age: 30
End: 2020-12-28

## 2020-12-28 VITALS — HEIGHT: 60 IN | TEMPERATURE: 99.7 F | WEIGHT: 135 LBS | BODY MASS INDEX: 26.5 KG/M2

## 2020-12-28 DIAGNOSIS — J06.9 ACUTE URI: Primary | ICD-10-CM

## 2020-12-28 PROCEDURE — 99441 PR PHYS/QHP TELEPHONE EVALUATION 5-10 MIN: CPT | Performed by: NURSE PRACTITIONER

## 2020-12-28 RX ORDER — PREDNISONE 20 MG/1
TABLET ORAL
Qty: 10 TABLET | Refills: 0 | Status: SHIPPED | OUTPATIENT
Start: 2020-12-28 | End: 2021-01-07

## 2020-12-28 RX ORDER — GUAIFENESIN AND DEXTROMETHORPHAN HYDROBROMIDE 100; 10 MG/5ML; MG/5ML
5-10 SOLUTION ORAL EVERY 6 HOURS PRN
Qty: 60 ML | Refills: 0 | Status: SHIPPED | OUTPATIENT
Start: 2020-12-28 | End: 2021-01-21

## 2020-12-28 RX ORDER — ALBUTEROL SULFATE 90 UG/1
2 AEROSOL, METERED RESPIRATORY (INHALATION) EVERY 4 HOURS PRN
Qty: 18 G | Refills: 2 | Status: SHIPPED | OUTPATIENT
Start: 2020-12-28 | End: 2021-01-25 | Stop reason: SDUPTHER

## 2020-12-28 RX ORDER — AMOXICILLIN AND CLAVULANATE POTASSIUM 875; 125 MG/1; MG/1
1 TABLET, FILM COATED ORAL EVERY 12 HOURS SCHEDULED
Qty: 20 TABLET | Refills: 0 | Status: SHIPPED | OUTPATIENT
Start: 2020-12-28 | End: 2021-01-07

## 2020-12-28 RX ORDER — PROMETHAZINE HYDROCHLORIDE 6.25 MG/5ML
6.25-12.5 SYRUP ORAL EVERY 6 HOURS PRN
Qty: 60 ML | Refills: 0 | Status: SHIPPED | OUTPATIENT
Start: 2020-12-28 | End: 2021-01-21

## 2020-12-28 NOTE — PROGRESS NOTES
"You have chosen to receive care through a telephone visit today. Do you consent to use a telephone visit for your medical care today? Yes    Establish patient makes contact with office of APRN to discuss health conditions.  Patient is due for routine checkup but due to current pandemic is not recommended to present to the office for face-to-face evaluation.      Chief Complaint   Patient presents with   • URI       Brief HPI/ROS obtained as follows:    Cough-reports she began to feel bad on Winsome night.  She reports low grade fever of 99 or less.  She reports cough is tight with occasional SOA.  VITAL has been present but mostly in the morning.  She reports some fatigue and generalized aching.  She reports no known illness exposures.  She reports some left lower lung \"pain\" but has not noted any fever.  Cough is occasional production.  Clear with \"brown from smoking\".  She is using Breo.  She has taken Motrin and Tylenol.  No nausea or vomiting.  She reports some gastric issue but \"that is not new\".    The following portions of the patient's history were reviewed and updated as appropriate: CC, ROS, allergies, current medications, past family history, past medical history, past social history, past surgical history and problem list.    Review of Systems   Constitutional: Positive for fatigue and fever. Negative for appetite change.   HENT: Positive for congestion, postnasal drip, rhinorrhea and sinus pressure. Negative for ear pain, nosebleeds, sore throat, trouble swallowing and voice change.         Throat irritation    Eyes: Negative for blurred vision, photophobia and visual disturbance.   Respiratory: Positive for cough, chest tightness and shortness of breath. Negative for wheezing.    Cardiovascular: Negative for chest pain and palpitations.   Gastrointestinal: Negative for abdominal pain, blood in stool, constipation, diarrhea, nausea and vomiting.   Endocrine: Negative for cold intolerance, heat " intolerance and polydipsia.   Genitourinary: Negative for dysuria and hematuria.   Musculoskeletal: Positive for arthralgias (body aches). Negative for back pain, gait problem and myalgias.   Skin: Negative for color change, pallor and bruise.   Allergic/Immunologic: Negative.    Neurological: Positive for dizziness (acute yesterday but has resolved) and headache.   Hematological: Negative.    Psychiatric/Behavioral: Negative for sleep disturbance and suicidal ideas. The patient is not nervous/anxious.    All other systems reviewed and are negative.      Assessment     Physical Exam     Patient alert and oriented.  Able to follow conversation without sounding breathless.  No obvious distress.  Thought processes congruent with conversation.  Answers and ask questions without difficulty.  Actively coughing.  Voice hoarseness noted.     Assessment     Problem List Items Addressed This Visit     None      Visit Diagnoses     Acute URI    -  Primary    Relevant Medications    amoxicillin-clavulanate (Augmentin) 875-125 MG per tablet    predniSONE (DELTASONE) 20 MG tablet    albuterol (PROVENTIL,VENTOLIN) 2 MG/5ML syrup    promethazine (PHENERGAN) 6.25 MG/5ML syrup    dextromethorphan-guaifenesin (Tussin DM)  MG/5ML syrup    albuterol sulfate  (90 Base) MCG/ACT inhaler          Patient instructed and advised to call if symptoms are increasing or new symptoms occur.    Understands reasons for urgent and emergent care.  Patient (& family) verbalized agreement for treatment plan.   Generalized precautions advised including social distancing, hand washing, cough/sneeze hygiene.     Advised patient if not feeling better in the next 48 hours to return to the clinic for flu and Covid screening.  Instructed to complete all of antibiotics for acute illness.  Increase PO fluids, avoid/limit caffeine.  Do not save any of the meds for later use.  Rest PRN  Steroids for home.  Understands risk for lower immune response  and to avoid others who are sick.   Understands increased risk for sunburn with prolonged exposure or tanning.  Understands may have facial flushing and possible jitteriness.  Take early in the AM.    Steam expectoration, warm compress to sinus, Saline PRN for moisture  May use some warm compress around rib area.  Discussed with patient could be some generalized discomfort related to her coughing.    RTC 3 to 5 days, sooner if needed for problems or concerns    This visit has been rescheduled as a phone visit to comply with patient safety concerns in accordance with CDC recommendations. Total time of discussion was 9 minutes.        This document has been electronically signed by:  RIANA Piña, FNP-C    Dragon disclaimer:  Much of this encounter note is an electronic transcription/translation of spoken language to printed text. The electronic translation of spoken language may permit erroneous, or at times, nonsensical words or phrases to be inadvertently transcribed; Although I have reviewed the note for such errors, some may still exist.

## 2020-12-29 ENCOUNTER — LAB (OUTPATIENT)
Dept: FAMILY MEDICINE CLINIC | Facility: CLINIC | Age: 30
End: 2020-12-29

## 2020-12-29 ENCOUNTER — TELEPHONE (OUTPATIENT)
Dept: FAMILY MEDICINE CLINIC | Facility: CLINIC | Age: 30
End: 2020-12-29

## 2020-12-29 DIAGNOSIS — R05.9 COUGH: Primary | ICD-10-CM

## 2020-12-29 DIAGNOSIS — R50.9 FEVER AND CHILLS: ICD-10-CM

## 2020-12-29 LAB
FLUAV RNA RESP QL NAA+PROBE: NOT DETECTED
FLUBV RNA RESP QL NAA+PROBE: NOT DETECTED
SARS-COV-2 RNA RESP QL NAA+PROBE: NOT DETECTED

## 2020-12-29 PROCEDURE — 87636 SARSCOV2 & INF A&B AMP PRB: CPT | Performed by: NURSE PRACTITIONER

## 2020-12-29 NOTE — TELEPHONE ENCOUNTER
PATIENTS  CALLED AND STATED THAT HE AND THE PATIENT SEEN JONG WOODS YESTERDAY 12/28/20. PEGGY TOLD THEM TO CALL BACK IF THEIR SYMPTOMS GOT WORSE OR CHANGED    PATIENT NOW HAS A FEVER OVER 100.5 DEGREES AND STILL HAS THE SAME SYMPTOMS AS YESTERDAY.    PLEASE CALL PATIENT AND ADVISE [phone]    [address] [phone] [facility] [phone]   [phone]

## 2021-01-14 ENCOUNTER — TELEPHONE (OUTPATIENT)
Dept: FAMILY MEDICINE CLINIC | Facility: CLINIC | Age: 31
End: 2021-01-14

## 2021-01-14 DIAGNOSIS — K58.1 IRRITABLE BOWEL SYNDROME WITH CONSTIPATION: ICD-10-CM

## 2021-01-14 NOTE — TELEPHONE ENCOUNTER
PATIENT  CALLED AND STATED PATIENT WAS NEEDING PRIOR AUTH ON   linaclotide (LINZESS) 290 MCG capsule capsule AND Fluticasone Furoate-Vilanterol (Breo Ellipta) 100-25 MCG/INH inhaler    PATIENT USES [facility] IN [address]    CALL BACK NUMBER [phone]

## 2021-01-15 ENCOUNTER — PRIOR AUTHORIZATION (OUTPATIENT)
Dept: FAMILY MEDICINE CLINIC | Facility: CLINIC | Age: 31
End: 2021-01-15

## 2021-01-21 ENCOUNTER — LAB (OUTPATIENT)
Dept: FAMILY MEDICINE CLINIC | Facility: CLINIC | Age: 31
End: 2021-01-21

## 2021-01-21 ENCOUNTER — OFFICE VISIT (OUTPATIENT)
Dept: FAMILY MEDICINE CLINIC | Facility: CLINIC | Age: 31
End: 2021-01-21

## 2021-01-21 VITALS — WEIGHT: 135 LBS | BODY MASS INDEX: 26.5 KG/M2 | HEIGHT: 60 IN

## 2021-01-21 DIAGNOSIS — J44.9 COPD MIXED TYPE (HCC): ICD-10-CM

## 2021-01-21 DIAGNOSIS — Z20.822 CLOSE EXPOSURE TO COVID-19 VIRUS: Primary | ICD-10-CM

## 2021-01-21 PROCEDURE — 99442 PR PHYS/QHP TELEPHONE EVALUATION 11-20 MIN: CPT | Performed by: NURSE PRACTITIONER

## 2021-01-21 PROCEDURE — G2025 DIS SITE TELE SVCS RHC/FQHC: HCPCS | Performed by: NURSE PRACTITIONER

## 2021-01-21 PROCEDURE — 87636 SARSCOV2 & INF A&B AMP PRB: CPT | Performed by: NURSE PRACTITIONER

## 2021-01-21 RX ORDER — PREDNISONE 20 MG/1
20 TABLET ORAL
Qty: 10 TABLET | Refills: 0 | Status: SHIPPED | OUTPATIENT
Start: 2021-01-21 | End: 2021-02-09

## 2021-01-21 NOTE — PROGRESS NOTES
You have chosen to receive care through a telephone visit. Do you consent to use a telephone visit for your medical care today? Yes    History of Present Illness   Elisabeth Hall is a 31 y.o. female who is c/o respiratory symptoms which started three to four days ago and worsening. She does have COPD and has been using her Inhalers on a regular basis. In addition, she has had a close COVID exposure in the last two days.      Respiratory Symptoms   The current episode started in the past 7 days. The problem has been gradually worsening. There has been no fever. Associated symptoms include congestion, coughing, rhinorrhea and wheezing. Pertinent negatives include no chest pain, nausea, rash, sinus pain, sneezing, sore throat, swollen glands or vomiting. She has tried inhaler use for the symptoms. The treatment provided mild relief.     The following portions of the patient's history were reviewed and updated as appropriate: allergies, current medications, past family history, past medical history, past social history, past surgical history and problem list.    Current Outpatient Medications:   •  albuterol sulfate  (90 Base) MCG/ACT inhaler, Inhale 2 puffs Every 4 (Four) Hours As Needed for Shortness of Air., Disp: 18 g, Rfl: 2  •  cyclobenzaprine (FLEXERIL) 5 MG tablet, Take 1 tablet by mouth 3 (Three) Times a Day As Needed for Muscle Spasms. for muscle spams, Disp: 90 tablet, Rfl: 5  •  estradiol (ESTRACE VAGINAL) 0.1 MG/GM vaginal cream, Daily x7 days then twice weekly, Disp: 1 each, Rfl: 12  •  Fluticasone Furoate-Vilanterol (Breo Ellipta) 100-25 MCG/INH inhaler, Inhale 1 puff Daily., Disp: 1 inhaler, Rfl: 5  •  lansoprazole (PREVACID SOLUTAB) 30 MG Tablet Delayed Release Dispersible disintegrating tablet, Take 1 tablet by mouth Daily., Disp: 30 tablet, Rfl: 5  •  linaclotide (LINZESS) 290 MCG capsule capsule, Take 1 capsule by mouth Every Morning Before Breakfast., Disp: 48 capsule, Rfl: 0  •  metoprolol  "succinate XL (TOPROL-XL) 25 MG 24 hr tablet, Take 1 tablet by mouth Daily., Disp: 30 tablet, Rfl: 5  •  nicotine (NICODERM CQ) 14 MG/24HR patch, Place 1 patch on the skin as directed by provider Daily., Disp: 14 patch, Rfl: 0  •  nicotine (NICODERM CQ) 21 MG/24HR patch, Place 1 patch on the skin as directed by provider Daily., Disp: 30 each, Rfl: 1  •  nicotine (NICODERM CQ) 7 MG/24HR patch, Place 1 patch on the skin as directed by provider Daily., Disp: 14 each, Rfl: 0  •  polyethylene glycol (MIRALAX) 17 GM/SCOOP powder, Take 68 g by mouth Daily. Mix with 16 oz liquid, Disp: 510 g, Rfl: 5  •  promethazine (PHENERGAN) 12.5 MG tablet, Take 1 tablet by mouth Every 6 (Six) Hours As Needed for Nausea or Vomiting., Disp: 30 tablet, Rfl: 0  •  predniSONE (DELTASONE) 20 MG tablet, Take 1 tablet by mouth 2 (Two) Times a Day Before Meals., Disp: 10 tablet, Rfl: 0    Allergies   Allergen Reactions   • Azithromycin GI Intolerance     Review of Systems   Constitutional: Positive for activity change and fatigue. Negative for appetite change, chills and fever.   HENT: Positive for congestion and rhinorrhea. Negative for sinus pressure, sinus pain, sneezing and sore throat.         Loss of smell or taste: Negative   Eyes: Negative for pain, discharge, redness and itching.   Respiratory: Positive for cough, shortness of breath and wheezing.    Cardiovascular: Negative for chest pain.   Gastrointestinal: Positive for constipation. Negative for nausea and vomiting.   Musculoskeletal: Positive for myalgias.   Skin: Negative for color change and rash.   Hematological: Negative for adenopathy.   Psychiatric/Behavioral: Negative for sleep disturbance.     Visit Vitals  Ht 152.4 cm (60\")   Wt 61.2 kg (135 lb)   LMP 08/25/2020   BMI 26.37 kg/m²       Physical Exam  Constitutional:       General: She is not in acute distress.     Comments: Pleasant, Nonproductive hoarse cough during visit   Pulmonary:      Effort: No respiratory distress. "   Neurological:      Mental Status: She is alert.   Psychiatric:         Mood and Affect: Mood and affect normal.         Behavior: Behavior is cooperative.         Thought Content: Thought content normal.       Assessment/Plan   Diagnoses and all orders for this visit:    1. Close exposure to COVID-19 virus (Primary)  -     COVID-19 and FLU A/B PCR - Swab, Nasopharynx    2. COPD mixed type (CMS/HCC)  -     predniSONE (DELTASONE) 20 MG tablet; Take 1 tablet by mouth 2 (Two) Times a Day Before Meals.  Dispense: 10 tablet; Refill: 0    Symptoms discussed with Elisabeth. Treatment options reviewed. COVID and Influenza tests ordered. Counseled her to quarantine until results are available. Will prescribe Prednisone for her COPD and she is to continue using her Inhalers. Counseled regarding supportive care measures. S/S of concern reviewed and if occur to seek further medical evaluation.   This visit has been scheduled as a phone visit to comply with patient safety concerns in accordance with CDC recommendations. Total time of discussion was  minutes.    This document has been electronically signed by RIANA Tian, SIMONA-DENI ROWAN  January 21, 2021 12:47 EST

## 2021-01-25 ENCOUNTER — LAB (OUTPATIENT)
Dept: FAMILY MEDICINE CLINIC | Facility: CLINIC | Age: 31
End: 2021-01-25

## 2021-01-25 ENCOUNTER — OFFICE VISIT (OUTPATIENT)
Dept: FAMILY MEDICINE CLINIC | Facility: CLINIC | Age: 31
End: 2021-01-25

## 2021-01-25 DIAGNOSIS — R50.9 FEVER, UNSPECIFIED FEVER CAUSE: ICD-10-CM

## 2021-01-25 DIAGNOSIS — J40 BRONCHITIS: ICD-10-CM

## 2021-01-25 DIAGNOSIS — R50.9 FEVER, UNSPECIFIED FEVER CAUSE: Primary | ICD-10-CM

## 2021-01-25 PROCEDURE — 99213 OFFICE O/P EST LOW 20 MIN: CPT | Performed by: PHYSICIAN ASSISTANT

## 2021-01-25 PROCEDURE — U0004 COV-19 TEST NON-CDC HGH THRU: HCPCS | Performed by: PHYSICIAN ASSISTANT

## 2021-01-25 RX ORDER — LEVOFLOXACIN 500 MG/1
500 TABLET, FILM COATED ORAL DAILY
Qty: 10 TABLET | Refills: 0 | Status: SHIPPED | OUTPATIENT
Start: 2021-01-25 | End: 2021-02-09

## 2021-01-25 RX ORDER — ALBUTEROL SULFATE 90 UG/1
2 AEROSOL, METERED RESPIRATORY (INHALATION) EVERY 4 HOURS PRN
Qty: 18 G | Refills: 2 | Status: SHIPPED | OUTPATIENT
Start: 2021-01-25

## 2021-01-25 NOTE — PATIENT INSTRUCTIONS

## 2021-01-25 NOTE — PROGRESS NOTES
Subjective   Elisabeth Hall is a 31 y.o. female.     Video Visit    You have chosen to receive care through a telehealth visit.  Do you consent to use a video/audio connection for your medical care today? Yes    Chief Complaint -fever    History of Present Illness -    ROS    Fever-  She complains of sudden onset of fever 100.1 °F, nausea, cough chest congestion and intermittent shortness of breath for the last 3 days.  Her  was exposed to Covid 6 days ago and she was exposed to her  since that time.  Minimal relief with Tylenol.    The following portions of the patient's history were reviewed and updated as appropriate: allergies, current medications, past family history, past medical history, past social history, past surgical history and problem list.    Review of Systems   Constitutional: Positive for appetite change, fatigue and fever. Negative for activity change.   HENT: Positive for congestion. Negative for ear pain, sinus pressure and sore throat.    Eyes: Negative for pain and visual disturbance.   Respiratory: Positive for cough and shortness of breath. Negative for chest tightness.    Cardiovascular: Negative for chest pain and palpitations.   Gastrointestinal: Positive for nausea. Negative for abdominal pain, constipation, diarrhea and vomiting.   Endocrine: Negative for polydipsia and polyuria.   Genitourinary: Negative for dysuria and frequency.   Musculoskeletal: Negative for back pain and myalgias.   Skin: Negative for color change and rash.   Allergic/Immunologic: Negative for food allergies and immunocompromised state.   Neurological: Negative for dizziness, syncope and headaches.   Hematological: Negative for adenopathy. Does not bruise/bleed easily.   Psychiatric/Behavioral: Negative for hallucinations and suicidal ideas. The patient is not nervous/anxious.        Objective  Vital signs:  [facility] 08/25/2020     Physical Exam  Vitals signs and nursing note reviewed.   Constitutional:        Comments: Patient appears lethargic   HENT:      Head: Normocephalic and atraumatic.      Mouth/Throat:      Mouth: Mucous membranes are moist.      Pharynx: Oropharynx is clear.   Eyes:      Extraocular Movements: Extraocular movements intact.      Conjunctiva/sclera: Conjunctivae normal.   Neck:      Musculoskeletal: Normal range of motion and neck supple.   Pulmonary:      Effort: Pulmonary effort is normal.      Breath sounds: No wheezing.      Comments: Patient is coughing intermittently during her video visit today  Musculoskeletal: Normal range of motion.      Right lower leg: No edema.      Left lower leg: No edema.   Skin:     General: Skin is dry.      Findings: No erythema or rash.   Neurological:      General: No focal deficit present.      Mental Status: She is oriented to person, place, and time.   Psychiatric:         Mood and Affect: Mood normal.         Thought Content: Thought content normal.         Result Review :              Assessment/Plan     Diagnoses and all orders for this visit:    1. Fever, unspecified fever cause (Primary)  -     COVID-19 PCR, [facility], NP SWAB IN [employer] VIRAL TRANSPORT MEDIA 24-30 HR TAT - Swab, Nasopharynx; Future    2. Bronchitis  -     levoFLOXacin (Levaquin) 500 MG tablet; Take 1 tablet by mouth Daily.  Dispense: 10 tablet; Refill: 0  -     albuterol sulfate  (90 Base) MCG/ACT inhaler; Inhale 2 puffs Every 4 (Four) Hours As Needed for Shortness of Air.  Dispense: 18 g; Refill: 2    Acute bronchitis  Advised regarding symptomatic treatment.  Suggested OTC remedies.  Antibiotic as per orders.  Encouraged to report if any worse or if any new symptoms.  Call in 5 days if symptoms aren't resolving.  Patient advised to quarantine 10 days from 1/23/2021 which was first day of symptoms.      Follow Up   No follow-ups on file.  Patient was given instructions and counseling regarding his condition or for health maintenance advice. Please see specific information  pulled into the AVS if appropriate      This document has been electronically signed by:  Chata Forman PA-C

## 2021-01-26 LAB — SARS-COV-2 RNA RESP QL NAA+PROBE: NOT DETECTED

## 2021-02-09 ENCOUNTER — OFFICE VISIT (OUTPATIENT)
Dept: FAMILY MEDICINE CLINIC | Facility: CLINIC | Age: 31
End: 2021-02-09

## 2021-02-09 VITALS
SYSTOLIC BLOOD PRESSURE: 114 MMHG | TEMPERATURE: 97.7 F | HEART RATE: 94 BPM | HEIGHT: 60 IN | DIASTOLIC BLOOD PRESSURE: 76 MMHG | OXYGEN SATURATION: 99 % | WEIGHT: 136 LBS | BODY MASS INDEX: 26.7 KG/M2

## 2021-02-09 DIAGNOSIS — Z72.0 TOBACCO ABUSE: ICD-10-CM

## 2021-02-09 DIAGNOSIS — N95.1 MENOPAUSAL SYMPTOMS: Chronic | ICD-10-CM

## 2021-02-09 DIAGNOSIS — M47.818 SI JOINT ARTHRITIS: Primary | Chronic | ICD-10-CM

## 2021-02-09 PROBLEM — N80.9 ENDOMETRIOSIS: Status: RESOLVED | Noted: 2020-08-26 | Resolved: 2021-02-09

## 2021-02-09 PROCEDURE — 96372 THER/PROPH/DIAG INJ SC/IM: CPT | Performed by: NURSE PRACTITIONER

## 2021-02-09 PROCEDURE — 99214 OFFICE O/P EST MOD 30 MIN: CPT | Performed by: NURSE PRACTITIONER

## 2021-02-09 RX ORDER — METHYLPREDNISOLONE ACETATE 80 MG/ML
80 INJECTION, SUSPENSION INTRA-ARTICULAR; INTRALESIONAL; INTRAMUSCULAR; SOFT TISSUE ONCE
Status: COMPLETED | OUTPATIENT
Start: 2021-02-09 | End: 2021-02-09

## 2021-02-09 RX ORDER — TRAMADOL HYDROCHLORIDE 50 MG/1
50-100 TABLET ORAL EVERY 6 HOURS PRN
Qty: 100 TABLET | Refills: 2 | Status: SHIPPED | OUTPATIENT
Start: 2021-02-09 | End: 2021-04-06

## 2021-02-09 RX ORDER — KETOROLAC TROMETHAMINE 30 MG/ML
60 INJECTION, SOLUTION INTRAMUSCULAR; INTRAVENOUS ONCE
Status: COMPLETED | OUTPATIENT
Start: 2021-02-09 | End: 2021-02-09

## 2021-02-09 RX ADMIN — METHYLPREDNISOLONE ACETATE 80 MG: 80 INJECTION, SUSPENSION INTRA-ARTICULAR; INTRALESIONAL; INTRAMUSCULAR; SOFT TISSUE at 13:36

## 2021-02-09 RX ADMIN — KETOROLAC TROMETHAMINE 60 MG: 30 INJECTION, SOLUTION INTRAMUSCULAR; INTRAVENOUS at 13:36

## 2021-02-09 NOTE — PROGRESS NOTES
Subjective   Elisabeth Hall is a 31 y.o. female.     No chief complaint on file.    Cc  I think my estrogen makes my hip pain worse     History of Present Illness:    Pt thinks she is allergic to estrogen supplement. When she does the estrogen she has hip pain. If she skips it she feels better.  Partial hysterectomy. Has left ovary. The estrace helps her mood swings and vaginal dryness.  Patient has chronic hip pain and low back pain.  Has a bulging disc.  Has been seen by neurosurgeon with recommended back surgery.  Patient is trying to avoid having back surgery due to the recovery.    Had ct of hips in august that was ordered by a different provider, she would like pcp to review today  Pt has had SI joint injections which only helped a few weeks and was very painful.  Rates her hip and back pain as 8 on pain scale rating of 0-10.  Pain is interfering with ability to do her daily routine and sleep at night.  Patient has tolerated tramadol in the past.  No history of substance abuse or addiction.  Pain is described as sharp, stinging and radiating down into the right lower extremity.  Worse with activity.  Has been in physical therapy in the past but not recently.  Strongly encouraged patient to quit smoking        The following portions of the patient's history and ROS were reviewed and updated as appropriate per provider:  Allergies, current medications, past family history, past medical history, past social history, past surgical history and problem list.    Review of Systems   Constitutional: Negative for activity change, appetite change, chills, fatigue and fever.   HENT: Negative for congestion, ear pain, facial swelling, hearing loss, sinus pressure, sore throat, trouble swallowing and voice change.    Eyes: Negative for pain, discharge and visual disturbance.   Respiratory: Negative for apnea, cough, chest tightness, shortness of breath and wheezing.    Cardiovascular: Negative for chest pain, palpitations  "and leg swelling.   Gastrointestinal: Negative for abdominal pain, blood in stool, constipation, diarrhea, nausea and vomiting.   Endocrine: Negative.    Genitourinary: Positive for dyspareunia and pelvic pain (at times with radiation from back and hip). Negative for difficulty urinating, dysuria, flank pain and urgency.   Musculoskeletal: Positive for arthralgias, back pain and gait problem. Negative for neck pain and neck stiffness.        Gait problems during times of exacerbation   Skin: Negative.  Negative for color change.   Allergic/Immunologic: Positive for environmental allergies. Negative for food allergies and immunocompromised state.   Neurological: Positive for headaches (Chronic, improved slightly). Negative for dizziness, tremors, seizures and facial asymmetry.   Hematological: Negative.    Psychiatric/Behavioral: Positive for agitation (Without hormones ). Negative for confusion, dysphoric mood, self-injury, sleep disturbance and suicidal ideas. The patient is not nervous/anxious.        Objective     /76   Pulse 94   Temp 97.7 °F (36.5 °C)   Ht 152.4 cm (60\")   Wt 61.7 kg (136 lb)   LMP 08/25/2020   SpO2 99%   BMI 26.56 kg/m²   Lab on 01/25/2021   Component Date Value Ref Range Status   • SARS-CoV-2 [place] 01/25/2021 Not Detected  Not Detected Final       Physical Exam  Vitals signs reviewed.   Constitutional:       General: She is not in acute distress.     Appearance: Normal appearance. She is well-developed. She is not ill-appearing, toxic-appearing or diaphoretic.      Comments: BMI is 26.56 the patient does not appear to be overweight   HENT:      Head: Normocephalic and atraumatic. Hair is normal.      Right Ear: Tympanic membrane, ear canal and external ear normal.      Left Ear: Tympanic membrane, ear canal and external ear normal.      Nose: Nose normal.      Right Sinus: No maxillary sinus tenderness or frontal sinus tenderness.      Left Sinus: No maxillary sinus tenderness or " frontal sinus tenderness.      Mouth/Throat:      Lips: Pink. No lesions.      Pharynx: No oropharyngeal exudate.   Eyes:      General: Lids are normal. Vision grossly intact. Gaze aligned appropriately.         Right eye: No discharge.         Left eye: No discharge.      Conjunctiva/sclera: Conjunctivae normal.      Pupils: Pupils are equal, round, and reactive to light.   Neck:      Musculoskeletal: Normal range of motion and neck supple. Normal range of motion. No spinous process tenderness or muscular tenderness.      Thyroid: No thyromegaly.      Trachea: No tracheal deviation.   Cardiovascular:      Rate and Rhythm: Normal rate and regular rhythm.      Pulses: Normal pulses.      Heart sounds: Normal heart sounds. No murmur. No friction rub. No gallop.    Pulmonary:      Effort: Pulmonary effort is normal. No accessory muscle usage or respiratory distress.      Breath sounds: Normal breath sounds. No wheezing or rales.   Chest:      Chest wall: No tenderness.   Abdominal:      General: Bowel sounds are normal. There is no distension.      Palpations: Abdomen is soft. There is no mass.      Tenderness: There is no abdominal tenderness. There is no guarding or rebound.   Musculoskeletal:      Lumbar back: She exhibits decreased range of motion and tenderness.        Back:    Lymphadenopathy:      Cervical: No cervical adenopathy.   Skin:     General: Skin is warm and dry.      Capillary Refill: Capillary refill takes less than 2 seconds.      Coloration: Skin is not cyanotic or pale.      Findings: No erythema or rash.      Nails: There is no clubbing.     Neurological:      General: No focal deficit present.      Mental Status: She is alert and oriented to person, place, and time.      Deep Tendon Reflexes: Reflexes are normal and symmetric.      Comments: CN 2-12 grossly intact    Psychiatric:         Attention and Perception: Attention and perception normal.         Mood and Affect: Mood and affect normal.          Speech: Speech normal.         Behavior: Behavior normal. Behavior is cooperative.         Thought Content: Thought content normal.         Cognition and Memory: Cognition normal.         Judgment: Judgment normal.         Assessment/Plan     Problem List Items Addressed This Visit        Genitourinary and Reproductive     Menopausal symptoms    Overview     Post partial hysterectomy sept 2020         Current Assessment & Plan     We will continue her on her estrogen cream weekly and as this is greatly helping her menopausal symptoms:.  I believe it is a coincidence that she is having increasing hip pain.  We will continue to monitor and adjust as needed.  Reviewed possible side effects and interactions.  Reviewed risk factors.            Tobacco    Tobacco abuse (Chronic)    Current Assessment & Plan     Encourage smoke cessation           Other Visit Diagnoses     SI joint arthritis  (Chronic)   -  Primary    Relevant Medications    ketorolac (TORADOL) injection 60 mg (Completed)    methylPREDNISolone acetate (DEPO-medrol) injection 80 mg (Completed)    traMADol (ULTRAM) 50 MG tablet    Other Relevant Orders    Ambulatory Referral to Physical Therapy Evaluate and treat (Completed)    Urine Drug Screen - Urine, Clean Catch        I have reviewed medication list and discussed with patient the possible side effects and interactions.  We have discussed purpose for medication, route, dosage, frequency.  Refill routine medications.    Recent labs reviewed and discussed with patient.    Pt has been instructed today regarding low fat heart smart diet. Weight management and routine exercise has been recommended. Avoid high fat foods, starchy foods and processed foods. Increase lean meats, fresh vegetables and fresh fruits.       Reviewed the CT , CT shows SI degeneration and changes.  Discussed with patient  Stay on estrace, refer to physical therapy.  Will monitor for changes.    We will start patient on tramadol.   This is offered her relief in the past.    Proper body mechanics has been reviewed and discussed today.      As part of this patient's treatment plan they are being prescribed controlled substance/substances with potential for abuse. This patient has been made aware of the appropriate use of such medications, including potential risk of somnolence, limited ability to drive and / or work safely, and potential for overdose. It has also been made clear that these medications are for use by this patient only, without concomitant use of alcohol or other substances unless prescribed/advised by medical provider. Patient has completed controlled substance agreement detailing terms of continued prescribing of controlled substances including monitoring [place] reports, drug screens and pill counts. The patient was asked and states they are not receiving narcotic medication from any there provider or any provider that this office has not been made aware of. History and physical exam exhibit continued safe and appropriate use of controlled substances.   Goal: Improved quality of life and reduction in pain as evidenced by pt report.   [place] has been reviewed as consistent.  UDS is on file.  New UDS will be obtained today.    Patient has been instructed and counseled regarding opioid /controlled medication misuse, side effects, possible interactions and risk of addiction.  Patient has been instructed on risk for oversedation, respiratory suppression and even death.  We have discussed proper storage and disposal of controlled medication.       Patient's Body mass index is 26.56 kg/m². BMI is above normal parameters. Recommendations include: exercise counseling and nutrition counseling.    Elisabeth LUGO Hall  reports that she has been smoking cigarettes. She has a 15.00 pack-year smoking history. She has never used smokeless tobacco.. I have educated her on the risk of diseases from using tobacco products such as cancer, COPD and  heart disease.     I advised her to quit and she is not willing to quit.    I spent 3  minutes counseling the patient.         I have discussed diagnosis in detail today allowing time for questions and answers. Patient is aware of reasons to seek urgent or emergent medical care as well as reasons to return to the clinic for evaluation. Possible side effects, interactions and progression of symptoms discussed as well. Patient / family states understanding.   Emotional support and active listening provided.       Follow up in one month, sooner if needed. Routine labs every 3-6 months.           This document has been electronically signed by:  RIANA Tellez NP-C  Answers for HPI/ROS submitted by the patient on 2/9/2021   What is the primary reason for your visit?: Other  Please describe your symptoms.: Hip pain, hormone therapy  Have you had these symptoms before?: Yes  How long have you been having these symptoms?: Greater than 2 weeks  Please list any medications you are currently taking for this condition.: Estradiol

## 2021-02-09 NOTE — ASSESSMENT & PLAN NOTE
We will continue her on her estrogen cream weekly and as this is greatly helping her menopausal symptoms:.  I believe it is a coincidence that she is having increasing hip pain.  We will continue to monitor and adjust as needed.  Reviewed possible side effects and interactions.  Reviewed risk factors.

## 2021-03-04 ENCOUNTER — TELEPHONE (OUTPATIENT)
Dept: FAMILY MEDICINE CLINIC | Facility: CLINIC | Age: 31
End: 2021-03-04

## 2021-03-04 DIAGNOSIS — K58.1 IRRITABLE BOWEL SYNDROME WITH CONSTIPATION: ICD-10-CM

## 2021-03-18 ENCOUNTER — BULK ORDERING (OUTPATIENT)
Dept: CASE MANAGEMENT | Facility: OTHER | Age: 31
End: 2021-03-18

## 2021-03-18 DIAGNOSIS — Z23 IMMUNIZATION DUE: ICD-10-CM

## 2021-03-24 ENCOUNTER — TELEMEDICINE (OUTPATIENT)
Dept: FAMILY MEDICINE CLINIC | Facility: CLINIC | Age: 31
End: 2021-03-24

## 2021-03-24 VITALS — WEIGHT: 135 LBS | BODY MASS INDEX: 26.5 KG/M2 | HEIGHT: 60 IN | RESPIRATION RATE: 20 BRPM

## 2021-03-24 DIAGNOSIS — J44.9 COPD MIXED TYPE (HCC): ICD-10-CM

## 2021-03-24 DIAGNOSIS — M79.7 FIBROMYALGIA: ICD-10-CM

## 2021-03-24 DIAGNOSIS — M25.50 PAIN IN JOINT INVOLVING MULTIPLE SITES: Primary | ICD-10-CM

## 2021-03-24 DIAGNOSIS — E66.3 OVERWEIGHT (BMI 25.0-29.9): Chronic | ICD-10-CM

## 2021-03-24 DIAGNOSIS — I10 ESSENTIAL HYPERTENSION: ICD-10-CM

## 2021-03-24 PROCEDURE — 99214 OFFICE O/P EST MOD 30 MIN: CPT | Performed by: NURSE PRACTITIONER

## 2021-03-24 NOTE — PROGRESS NOTES
"Subjective   Elisabeth Hall is a 31 y.o. female.     No chief complaint on file.    Chief complaint  Hurting all over    History of Present Illness:    Having all over joint pan. In her legs, arms , back and head constantly. Tired. Now states her pain is \" everywhere and all the time\". Even hurts in her hands if she uses them. Described as \" feels like a throbbing, gnawing pain in my bones\".  Failed PT. Heat helps some. Ice does not help. OTC meds help little. Heat seems to help with her hands more than the rest of her body.  Pain is interfering with ability to sleep or do her usual activities of living.  Patient does have a prescription for Ultram which helps decrease her pain.  Elisabeth says that she does not like to take pain medications as she does not take it until she cannot stand the pain.  Her pain is rated 9 on a pain scale rating of 0-10 today.  No recent labs.  No recent radiology.  Patient does have MRI of lumbar spine on file from 2015 showing disc desiccation with posterior bulge and mild central canal stenosis and minimal bilateral neural foraminal stenosis at L4-L5 level.  There is also disc desiccation with broad-based posterior disc bulge causing central canal stenosis and bilateral neural foraminal stenosis at L5-S1 with facet arthropathy.  Previous surgery.  Worsening symptoms.  Chronic low back, hip and leg pain that is worsening.    Full chronic healthcare conditions including constipation, COPD, tobacco use, elevated lipids, GERD, hypertension, fibromyalgia and iron deficiency anemia.  Patient currently receives albuterol and an as-needed basis, Flexeril 5 mg up to 3 times a day as needed.  She does receive estradiol vaginal cream for hormone replacement following hysterectomy, Prevacid for GERD, Linzess for constipation and metoprolol for hypertension.  Patient also receives Breo for her COPD.  She denies exacerbation and chronic conditions except for chronic pain.  No recent rheumatoid panel " "has been performed.        The following portions of the patient's history and ROS were reviewed and updated as appropriate per provider:  Allergies, current medications, past family history, past medical history, past social history, past surgical history and problem list.    Review of Systems   Constitutional: Positive for fatigue. Negative for activity change, appetite change, chills and fever.   HENT: Negative for ear pain, facial swelling, hearing loss, sinus pressure, sore throat, trouble swallowing and voice change.    Eyes: Negative for pain, discharge and visual disturbance.   Respiratory: Negative for apnea, cough, chest tightness, shortness of breath and wheezing.    Cardiovascular: Negative for chest pain, palpitations and leg swelling.   Gastrointestinal: Negative for abdominal pain, blood in stool, constipation, diarrhea, nausea and vomiting.   Endocrine: Negative.    Genitourinary: Negative for difficulty urinating, dysuria and frequency.   Musculoskeletal: Positive for arthralgias, back pain, myalgias and neck pain. Negative for neck stiffness.   Skin: Negative for color change.   Allergic/Immunologic: Positive for environmental allergies. Negative for food allergies and immunocompromised state.   Neurological: Negative for dizziness, tremors, facial asymmetry, speech difficulty, weakness and headaches.   Hematological: Negative.    Psychiatric/Behavioral: Positive for sleep disturbance. Negative for confusion, dysphoric mood, self-injury and suicidal ideas. The patient is not nervous/anxious.        Objective     Resp 20   Ht 152.4 cm (60\")   Wt 61.2 kg (135 lb)   LMP 08/25/2020   BMI 26.37 kg/m²   Lab on 01/25/2021   Component Date Value Ref Range Status   • SARS-CoV-2 [place] 01/25/2021 Not Detected  Not Detected Final       Physical Exam  Vitals reviewed.   Constitutional:       Appearance: Normal appearance. She is not ill-appearing.   HENT:      Head: Atraumatic.      Right Ear: Hearing and " external ear normal.      Left Ear: Hearing and external ear normal.      Nose:      Comments: External nose normal      Mouth/Throat:      Lips: Pink. No lesions.   Eyes:      General: Lids are normal. Vision grossly intact. Gaze aligned appropriately.   Pulmonary:      Effort: No accessory muscle usage or respiratory distress.   Musculoskeletal:         General: Normal range of motion.      Cervical back: Normal range of motion.      Comments: Patient appears to be in pain with movement.  She is rubbing her hands during visit   Skin:     Coloration: Skin is not cyanotic or pale.      Nails: There is no clubbing.   Neurological:      Mental Status: She is alert and oriented to person, place, and time.   Psychiatric:         Attention and Perception: Attention normal.         Mood and Affect: Mood and affect normal.         Speech: Speech normal.         Behavior: Behavior normal. Behavior is cooperative.         Thought Content: Thought content normal.         Cognition and Memory: Cognition and memory normal.         Assessment/Plan     Problem List Items Addressed This Visit        Cardiac and Vasculature    Essential hypertension    Relevant Orders    CBC & Differential    Comprehensive Metabolic Panel    TSH    Hemoglobin A1c    Vitamin D 25 Hydroxy    Lipid Panel    Uric Acid    MicroAlbumin, Urine, Random - Urine, Clean Catch    Vitamin B12    Rheumatoid Factor    VIOLETTE    Sedimentation Rate    C-reactive Protein    Urine Drug Screen - Urine, Clean Catch    XR Spine Lumbar 2 or 3 View    XR Spine Cervical 2 or 3 View    XR Hand 3+ View Right    XR Hand 3+ View Left    XR Wrist 3+ View Right    XR Wrist 3+ View Left    XR Foot 3+ View Left    XR Foot 3+ View Right       Endocrine and Metabolic    Overweight (BMI 25.0-29.9) (Chronic)    Current Assessment & Plan     Patient's (Body mass index is 26.37 kg/m².) indicates that they are overweight (BMI 25-29.9) with obesity-related health conditions that include  hypertension and dyslipidemias . Obesity is improving with lifestyle modifications. BMI is is above average; BMI management plan is completed. We discussed portion control and increasing exercise.             Musculoskeletal and Injuries    Fibromyalgia    Relevant Orders    CBC & Differential    Comprehensive Metabolic Panel    TSH    Hemoglobin A1c    Vitamin D 25 Hydroxy    Lipid Panel    Uric Acid    MicroAlbumin, Urine, Random - Urine, Clean Catch    Vitamin B12    Rheumatoid Factor    VIOLETTE    Sedimentation Rate    C-reactive Protein    Urine Drug Screen - Urine, Clean Catch    XR Spine Lumbar 2 or 3 View    XR Spine Cervical 2 or 3 View    XR Hand 3+ View Right    XR Hand 3+ View Left    XR Wrist 3+ View Right    XR Wrist 3+ View Left    XR Foot 3+ View Left    XR Foot 3+ View Right    Pain in joint involving multiple sites - Primary    Relevant Orders    CBC & Differential    Comprehensive Metabolic Panel    TSH    Hemoglobin A1c    Vitamin D 25 Hydroxy    Lipid Panel    Uric Acid    MicroAlbumin, Urine, Random - Urine, Clean Catch    Vitamin B12    Rheumatoid Factor    VIOLETTE    Sedimentation Rate    C-reactive Protein    Urine Drug Screen - Urine, Clean Catch    XR Spine Lumbar 2 or 3 View    XR Spine Cervical 2 or 3 View    XR Hand 3+ View Right    XR Hand 3+ View Left    XR Wrist 3+ View Right    XR Wrist 3+ View Left    XR Foot 3+ View Left    XR Foot 3+ View Right       Pulmonary and Pneumonias    COPD mixed type (CMS/HCC)    Relevant Orders    CBC & Differential    Comprehensive Metabolic Panel    TSH    Hemoglobin A1c    Vitamin D 25 Hydroxy    Lipid Panel    Uric Acid    MicroAlbumin, Urine, Random - Urine, Clean Catch    Vitamin B12    Rheumatoid Factor    VIOLETTE    Sedimentation Rate    C-reactive Protein    Urine Drug Screen - Urine, Clean Catch    XR Spine Lumbar 2 or 3 View    XR Spine Cervical 2 or 3 View    XR Hand 3+ View Right    XR Hand 3+ View Left    XR Wrist 3+ View Right    XR Wrist 3+ View  Left    XR Foot 3+ View Left    XR Foot 3+ View Right          I have reviewed medication list and discussed with patient the possible side effects and interactions.  We have discussed purpose for medication, route, dosage, frequency.  Refill routine medications.    Recent labs reviewed and discussed with patient.  New lab order placed including rheumatology screening.  Multiple radiology orders placed.  MRI reviewed from 2015, reviewed most recent consult/discharge summaries from 9/11/2020, x-ray images from 9/10/2020 and imaging from [facility] 8/28/2020  Discussed body mechanics.  Patient has Ultram remaining.  Discussed with patient that it is easier to prevent extreme pain than it is to decrease extreme pain when she allow it to occur.  I have encouraged her to take her Ultram at least twice daily for the next 5 days and see if this helps decrease her pain and improve her quality of life.  Néstor is consistent.  She has refills remaining on Ultram at this time.  I will go ahead and get a screening urine drug screen when she comes in tomorrow for fasting labs.  Body mechanics has been reviewed.  Emotional support has been provided.  Will refer as indicated.       Patient's Body mass index is 26.37 kg/m². BMI is above normal parameters. Recommendations include: nutrition counseling.    Elisabeth Hall  reports that she has been smoking cigarettes. She has a 15.00 pack-year smoking history. She has never used smokeless tobacco.. I have educated her on the risk of diseases from using tobacco products such as cancer, COPD and heart disease.     I advised her to quit and she is not willing to quit.    I spent 3  minutes counseling the patient.  Strongly encouraged patient to stop smoking.         I have discussed diagnosis in detail today allowing time for questions and answers. Patient is aware of reasons to seek urgent or emergent medical care as well as reasons to return to the clinic for  evaluation. Possible side effects, interactions and progression of symptoms discussed as well. Patient / family states understanding.   Emotional support and active listening provided.     I like to see her back next week to go over labs and imaging.  Will be coming in in the morning for fasting labs.    mychart visit today with audio and visual.  Multiple orders have been placed.  Multiple records have been reviewed.  Time on my chart visit approximately 16 minutes, and additional 15 minutes spent doing chart review and documentation.        This document has been electronically signed by:  RIANA Tellez, NP-C

## 2021-03-24 NOTE — ASSESSMENT & PLAN NOTE
Patient's (Body mass index is 26.37 kg/m².) indicates that they are overweight (BMI 25-29.9) with obesity-related health conditions that include hypertension and dyslipidemias . Obesity is improving with lifestyle modifications. BMI is is above average; BMI management plan is completed. We discussed portion control and increasing exercise.

## 2021-03-25 ENCOUNTER — HOSPITAL ENCOUNTER (OUTPATIENT)
Dept: GENERAL RADIOLOGY | Facility: HOSPITAL | Age: 31
Discharge: HOME | End: 2021-03-25
Admitting: NURSE PRACTITIONER

## 2021-03-25 ENCOUNTER — LAB (OUTPATIENT)
Dept: FAMILY MEDICINE CLINIC | Facility: CLINIC | Age: 31
End: 2021-03-25

## 2021-03-25 DIAGNOSIS — J44.9 COPD MIXED TYPE (HCC): ICD-10-CM

## 2021-03-25 DIAGNOSIS — M25.50 PAIN IN JOINT INVOLVING MULTIPLE SITES: ICD-10-CM

## 2021-03-25 DIAGNOSIS — M79.7 FIBROMYALGIA: ICD-10-CM

## 2021-03-25 DIAGNOSIS — I10 ESSENTIAL HYPERTENSION: ICD-10-CM

## 2021-03-25 DIAGNOSIS — M54.42 CHRONIC MIDLINE LOW BACK PAIN WITH BILATERAL SCIATICA: Primary | ICD-10-CM

## 2021-03-25 DIAGNOSIS — G89.29 CHRONIC MIDLINE LOW BACK PAIN WITH BILATERAL SCIATICA: Primary | ICD-10-CM

## 2021-03-25 DIAGNOSIS — M54.41 CHRONIC MIDLINE LOW BACK PAIN WITH BILATERAL SCIATICA: Primary | ICD-10-CM

## 2021-03-25 LAB
25(OH)D3 SERPL-MCNC: 20.7 NG/ML
ALBUMIN SERPL-MCNC: 4.7 G/DL (ref 3.5–5.2)
ALBUMIN UR-MCNC: <1.2 MG/DL
ALBUMIN/GLOB SERPL: 1.7 G/DL
ALP SERPL-CCNC: 73 U/L (ref 39–117)
ALT SERPL W P-5'-P-CCNC: 21 U/L (ref 1–33)
ANION GAP SERPL CALCULATED.3IONS-SCNC: 10.1 MMOL/L (ref 5–15)
AST SERPL-CCNC: 16 U/L (ref 1–32)
BASOPHILS # BLD AUTO: 0.09 10*3/MM3 (ref 0–0.2)
BASOPHILS NFR BLD AUTO: 1 % (ref 0–1.5)
BILIRUB SERPL-MCNC: <0.2 MG/DL (ref 0–1.2)
BUN SERPL-MCNC: 14 MG/DL (ref 6–20)
BUN/CREAT SERPL: 17.9 (ref 7–25)
CALCIUM SPEC-SCNC: 9.5 MG/DL (ref 8.6–10.5)
CHLORIDE SERPL-SCNC: 103 MMOL/L (ref 98–107)
CHOLEST SERPL-MCNC: 190 MG/DL (ref 0–200)
CHROMATIN AB SERPL-ACNC: <10 IU/ML (ref 0–14)
CO2 SERPL-SCNC: 24.9 MMOL/L (ref 22–29)
CREAT SERPL-MCNC: 0.78 MG/DL (ref 0.57–1)
CRP SERPL-MCNC: <0.3 MG/DL (ref 0–0.5)
DEPRECATED RDW RBC AUTO: 44.5 FL (ref 37–54)
EOSINOPHIL # BLD AUTO: 0.49 10*3/MM3 (ref 0–0.4)
EOSINOPHIL NFR BLD AUTO: 5.2 % (ref 0.3–6.2)
ERYTHROCYTE [DISTWIDTH] IN BLOOD BY AUTOMATED COUNT: 12.8 % (ref 12.3–15.4)
ERYTHROCYTE [SEDIMENTATION RATE] IN BLOOD: 4 MM/HR (ref 0–20)
GFR SERPL CREATININE-BSD FRML MDRD: 86 ML/MIN/1.73
GLOBULIN UR ELPH-MCNC: 2.7 GM/DL
GLUCOSE SERPL-MCNC: 87 MG/DL (ref 65–99)
HBA1C MFR BLD: 5.28 % (ref 4.8–5.6)
HCT VFR BLD AUTO: 44.5 % (ref 34–46.6)
HDLC SERPL-MCNC: 42 MG/DL (ref 40–60)
HGB BLD-MCNC: 14.9 G/DL (ref 12–15.9)
IMM GRANULOCYTES # BLD AUTO: 0.03 10*3/MM3 (ref 0–0.05)
IMM GRANULOCYTES NFR BLD AUTO: 0.3 % (ref 0–0.5)
LDLC SERPL CALC-MCNC: 134 MG/DL (ref 0–100)
LDLC/HDLC SERPL: 3.16 {RATIO}
LYMPHOCYTES # BLD AUTO: 2.64 10*3/MM3 (ref 0.7–3.1)
LYMPHOCYTES NFR BLD AUTO: 28.1 % (ref 19.6–45.3)
MCH RBC QN AUTO: 31.6 PG (ref 26.6–33)
MCHC RBC AUTO-ENTMCNC: 33.5 G/DL (ref 31.5–35.7)
MCV RBC AUTO: 94.5 FL (ref 79–97)
MONOCYTES # BLD AUTO: 0.56 10*3/MM3 (ref 0.1–0.9)
MONOCYTES NFR BLD AUTO: 6 % (ref 5–12)
NEUTROPHILS NFR BLD AUTO: 5.58 10*3/MM3 (ref 1.7–7)
NEUTROPHILS NFR BLD AUTO: 59.4 % (ref 42.7–76)
NRBC BLD AUTO-RTO: 0 /100 WBC (ref 0–0.2)
PLATELET # BLD AUTO: 340 10*3/MM3 (ref 140–450)
PMV BLD AUTO: 9.4 FL (ref 6–12)
POTASSIUM SERPL-SCNC: 4.4 MMOL/L (ref 3.5–5.2)
PROT SERPL-MCNC: 7.4 G/DL (ref 6–8.5)
RBC # BLD AUTO: 4.71 10*6/MM3 (ref 3.77–5.28)
SODIUM SERPL-SCNC: 138 MMOL/L (ref 136–145)
TRIGL SERPL-MCNC: 77 MG/DL (ref 0–150)
TSH SERPL DL<=0.05 MIU/L-ACNC: 1.26 UIU/ML (ref 0.27–4.2)
URATE SERPL-MCNC: 3.8 MG/DL (ref 2.4–5.7)
VIT B12 BLD-MCNC: 510 PG/ML (ref 211–946)
VLDLC SERPL-MCNC: 14 MG/DL (ref 5–40)
WBC # BLD AUTO: 9.39 10*3/MM3 (ref 3.4–10.8)

## 2021-03-25 PROCEDURE — 72100 X-RAY EXAM L-S SPINE 2/3 VWS: CPT | Performed by: RADIOLOGY

## 2021-03-25 PROCEDURE — 86431 RHEUMATOID FACTOR QUANT: CPT | Performed by: NURSE PRACTITIONER

## 2021-03-25 PROCEDURE — 85652 RBC SED RATE AUTOMATED: CPT | Performed by: NURSE PRACTITIONER

## 2021-03-25 PROCEDURE — 83036 HEMOGLOBIN GLYCOSYLATED A1C: CPT | Performed by: NURSE PRACTITIONER

## 2021-03-25 PROCEDURE — 73110 X-RAY EXAM OF WRIST: CPT

## 2021-03-25 PROCEDURE — 80053 COMPREHEN METABOLIC PANEL: CPT | Performed by: NURSE PRACTITIONER

## 2021-03-25 PROCEDURE — 80061 LIPID PANEL: CPT | Performed by: NURSE PRACTITIONER

## 2021-03-25 PROCEDURE — 82607 VITAMIN B-12: CPT | Performed by: NURSE PRACTITIONER

## 2021-03-25 PROCEDURE — 73630 X-RAY EXAM OF FOOT: CPT | Performed by: RADIOLOGY

## 2021-03-25 PROCEDURE — 84443 ASSAY THYROID STIM HORMONE: CPT | Performed by: NURSE PRACTITIONER

## 2021-03-25 PROCEDURE — 73630 X-RAY EXAM OF FOOT: CPT

## 2021-03-25 PROCEDURE — 73110 X-RAY EXAM OF WRIST: CPT | Performed by: RADIOLOGY

## 2021-03-25 PROCEDURE — 86140 C-REACTIVE PROTEIN: CPT | Performed by: NURSE PRACTITIONER

## 2021-03-25 PROCEDURE — 73130 X-RAY EXAM OF HAND: CPT

## 2021-03-25 PROCEDURE — 82043 UR ALBUMIN QUANTITATIVE: CPT | Performed by: NURSE PRACTITIONER

## 2021-03-25 PROCEDURE — 82306 VITAMIN D 25 HYDROXY: CPT | Performed by: NURSE PRACTITIONER

## 2021-03-25 PROCEDURE — 72100 X-RAY EXAM L-S SPINE 2/3 VWS: CPT

## 2021-03-25 PROCEDURE — 73130 X-RAY EXAM OF HAND: CPT | Performed by: RADIOLOGY

## 2021-03-25 PROCEDURE — 72040 X-RAY EXAM NECK SPINE 2-3 VW: CPT

## 2021-03-25 PROCEDURE — 86038 ANTINUCLEAR ANTIBODIES: CPT | Performed by: NURSE PRACTITIONER

## 2021-03-25 PROCEDURE — 84550 ASSAY OF BLOOD/URIC ACID: CPT | Performed by: NURSE PRACTITIONER

## 2021-03-25 PROCEDURE — 72040 X-RAY EXAM NECK SPINE 2-3 VW: CPT | Performed by: RADIOLOGY

## 2021-03-25 PROCEDURE — 85025 COMPLETE CBC W/AUTO DIFF WBC: CPT | Performed by: NURSE PRACTITIONER

## 2021-03-25 NOTE — PROGRESS NOTES
Abnormal x-ray of the lumbar spine.  Disc spaces in the lumbar spine are remarkable for mild narrowing at L4-5 and L5-S1 recommend MRI of the lumbar spine.

## 2021-03-26 LAB — ANA SER QL: NEGATIVE

## 2021-03-29 NOTE — PROGRESS NOTES
Notify patient VIOLETTE is negative, rheumatoid factor is normal, sed rate is normal, vitamin D level is low.  She needs to get an over-the-counter vitamin D of at least 1000 IU and take it daily for the next 6 months.  B12 was normal.  Thyroid function is normal.  CMP which includes her glucose, kidney and liver function and other electrolytes is normal.  Her LDL cholesterol is slightly elevated.  She needs to work on a weight loss, decreasing fried and fatty foods and exercise to help decrease this.  If she is unable to get this down her own we will need to initiate medication at her next visit.  She does not have gout.  C-reactive protein is normal.  Her hemoglobin A1c is normal.  Patient is not a diabetic.  CBC is relatively normal, mildly elevated sono feels which could be related to some allergy symptoms.

## 2021-03-30 ENCOUNTER — HOSPITAL ENCOUNTER (OUTPATIENT)
Dept: MRI IMAGING | Facility: HOSPITAL | Age: 31
Discharge: HOME | End: 2021-03-30

## 2021-03-30 DIAGNOSIS — M54.41 CHRONIC MIDLINE LOW BACK PAIN WITH BILATERAL SCIATICA: ICD-10-CM

## 2021-03-30 DIAGNOSIS — M54.42 CHRONIC MIDLINE LOW BACK PAIN WITH BILATERAL SCIATICA: ICD-10-CM

## 2021-03-30 DIAGNOSIS — G89.29 CHRONIC MIDLINE LOW BACK PAIN WITH BILATERAL SCIATICA: ICD-10-CM

## 2021-04-02 ENCOUNTER — TELEPHONE (OUTPATIENT)
Dept: FAMILY MEDICINE CLINIC | Facility: CLINIC | Age: 31
End: 2021-04-02

## 2021-04-02 ENCOUNTER — APPOINTMENT (OUTPATIENT)
Dept: MRI IMAGING | Facility: HOSPITAL | Age: 31
End: 2021-04-02

## 2021-04-02 NOTE — TELEPHONE ENCOUNTER
"Caller: Elisabeth Hall    Relationship: Self    Best call back number:[phone]    What test/procedure requested: MRI    When is it needed: ASAP    [facility]    Additional information or concerns: PATIENT STATES SHE RECEIVED A CALL AND THIS WAS DENIED. PATIENT STATES SHE WAS TOLD THIS NEEDED A \"PEER TO PEER\"       "

## 2021-04-06 ENCOUNTER — OFFICE VISIT (OUTPATIENT)
Dept: FAMILY MEDICINE CLINIC | Facility: CLINIC | Age: 31
End: 2021-04-06

## 2021-04-06 DIAGNOSIS — M79.7 FIBROMYALGIA: ICD-10-CM

## 2021-04-06 DIAGNOSIS — K58.1 IRRITABLE BOWEL SYNDROME WITH CONSTIPATION: ICD-10-CM

## 2021-04-06 DIAGNOSIS — I10 ESSENTIAL HYPERTENSION: ICD-10-CM

## 2021-04-06 DIAGNOSIS — E55.9 VITAMIN D DEFICIENCY: ICD-10-CM

## 2021-04-06 DIAGNOSIS — J44.9 COPD MIXED TYPE (HCC): ICD-10-CM

## 2021-04-06 DIAGNOSIS — M54.41 CHRONIC MIDLINE LOW BACK PAIN WITH RIGHT-SIDED SCIATICA: ICD-10-CM

## 2021-04-06 DIAGNOSIS — F17.200 SMOKER: ICD-10-CM

## 2021-04-06 DIAGNOSIS — G25.81 RLS (RESTLESS LEGS SYNDROME): ICD-10-CM

## 2021-04-06 DIAGNOSIS — I10 ESSENTIAL HYPERTENSION: Primary | ICD-10-CM

## 2021-04-06 DIAGNOSIS — Z00.00 HEALTHCARE MAINTENANCE: ICD-10-CM

## 2021-04-06 DIAGNOSIS — G89.29 CHRONIC MIDLINE LOW BACK PAIN WITH RIGHT-SIDED SCIATICA: ICD-10-CM

## 2021-04-06 DIAGNOSIS — K21.9 GASTROESOPHAGEAL REFLUX DISEASE WITHOUT ESOPHAGITIS: ICD-10-CM

## 2021-04-06 DIAGNOSIS — K58.9 IRRITABLE BOWEL SYNDROME, UNSPECIFIED TYPE: ICD-10-CM

## 2021-04-06 PROBLEM — E78.00 HYPERCHOLESTEROLEMIA: Status: RESOLVED | Noted: 2020-04-02 | Resolved: 2021-04-06

## 2021-04-06 PROBLEM — E78.5 ELEVATED FASTING LIPID PROFILE: Status: RESOLVED | Noted: 2017-12-21 | Resolved: 2021-04-06

## 2021-04-06 PROBLEM — E66.3 OVERWEIGHT (BMI 25.0-29.9): Chronic | Status: RESOLVED | Noted: 2021-03-24 | Resolved: 2021-04-06

## 2021-04-06 PROBLEM — R53.83 FATIGUE: Status: RESOLVED | Noted: 2017-12-21 | Resolved: 2021-04-06

## 2021-04-06 PROBLEM — J35.8 OTHER CHRONIC DISEASE OF TONSILS AND ADENOIDS: Status: RESOLVED | Noted: 2020-04-02 | Resolved: 2021-04-06

## 2021-04-06 PROBLEM — R10.12 LUQ ABDOMINAL PAIN: Status: RESOLVED | Noted: 2020-08-17 | Resolved: 2021-04-06

## 2021-04-06 PROBLEM — R11.0 NAUSEA: Status: RESOLVED | Noted: 2020-03-17 | Resolved: 2021-04-06

## 2021-04-06 PROCEDURE — 99214 OFFICE O/P EST MOD 30 MIN: CPT | Performed by: GENERAL PRACTICE

## 2021-04-06 RX ORDER — DULOXETIN HYDROCHLORIDE 60 MG/1
60 CAPSULE, DELAYED RELEASE ORAL DAILY
Qty: 30 CAPSULE | Refills: 5 | Status: SHIPPED | OUTPATIENT
Start: 2021-04-06 | End: 2022-01-28

## 2021-04-07 VITALS
TEMPERATURE: 97.3 F | RESPIRATION RATE: 14 BRPM | OXYGEN SATURATION: 91 % | BODY MASS INDEX: 26.31 KG/M2 | DIASTOLIC BLOOD PRESSURE: 60 MMHG | SYSTOLIC BLOOD PRESSURE: 112 MMHG | HEART RATE: 101 BPM | WEIGHT: 134 LBS | HEIGHT: 60 IN

## 2021-04-07 PROBLEM — R10.9 STOMACH PAIN: Status: RESOLVED | Noted: 2020-02-21 | Resolved: 2021-04-07

## 2021-04-07 PROBLEM — E55.9 VITAMIN D DEFICIENCY: Status: ACTIVE | Noted: 2021-04-07

## 2021-04-07 PROBLEM — L72.3 SEBACEOUS CYST OF EAR: Status: RESOLVED | Noted: 2018-02-05 | Resolved: 2021-04-07

## 2021-04-07 PROBLEM — R10.84 GENERALIZED ABDOMINAL PAIN: Status: RESOLVED | Noted: 2020-03-17 | Resolved: 2021-04-07

## 2021-04-07 PROBLEM — Z00.00 HEALTHCARE MAINTENANCE: Status: ACTIVE | Noted: 2021-04-07

## 2021-04-07 PROBLEM — M25.50 PAIN IN JOINT INVOLVING MULTIPLE SITES: Status: RESOLVED | Noted: 2021-03-24 | Resolved: 2021-04-07

## 2021-04-07 PROBLEM — R73.01 ELEVATED FASTING GLUCOSE: Status: RESOLVED | Noted: 2017-12-21 | Resolved: 2021-04-07

## 2021-04-07 RX ORDER — METOPROLOL SUCCINATE 25 MG/1
TABLET, EXTENDED RELEASE ORAL
Qty: 30 TABLET | Refills: 0 | Status: SHIPPED | OUTPATIENT
Start: 2021-04-07 | End: 2021-04-07

## 2021-04-07 RX ORDER — ERGOCALCIFEROL 1.25 MG/1
50000 CAPSULE ORAL WEEKLY
Qty: 4 CAPSULE | Refills: 5 | Status: SHIPPED | OUTPATIENT
Start: 2021-04-07 | End: 2022-01-28

## 2021-04-07 RX ORDER — METOPROLOL SUCCINATE 25 MG/1
25 TABLET, EXTENDED RELEASE ORAL DAILY
Qty: 30 TABLET | Refills: 5 | Status: SHIPPED | OUTPATIENT
Start: 2021-04-07 | End: 2021-11-19

## 2021-04-07 NOTE — TELEPHONE ENCOUNTER
It looks like the denial states she needs PT or to have failed an oral course of steroids.  I can see [place] has ordered PT as well.  She is likely going to have to attend some sessions and then we can attempt to reorder the MRI.

## 2021-04-07 NOTE — PROGRESS NOTES
Subjective   Elisabeth Hall is a 31 y.o. female.     History of Present Illness     Generalized Pain  She gives an approximate 1 year history of generalized joint and muscle pain.  This has been associated with morning stiffness, insomnia, and daytime fatigue.  There is no history of any joint swelling or warmth, changes in her strength, sensation, or bowel/bladder control, rash, fever, chills, or night sweats.  She has a history of IBS and RLS.  Her father has apparently been diagnosed with RA.  There is no other history of connective tissue disease.  ESR, CRP, RF, VIOLETTE, and uric acid levels drawn on 3/25/2021 were unremarkable.    Chronic Obstructive Pulmonary Disease  She gives a 2 to 3 year history of shortness of breath with exertion associated with a dry cough.  There is no history of any chest pain or hemoptysis.  She is currently using her rescue inhaler every day or two.  She does not recall having any problems with her breathing as a child or young adult.  She was started on metoprolol 2 to 3 years ago.  She continues to smoke.  She has a brother with asthma.    Essential Hypertension  This has been well controlled with metoprolol.  She had previously been prescribed atenolol, lisinopril, and amlodipine with either a lack of effect or intolerable side effects.  She denies any chest pain, palpitations, lightheadedness, or swelling the ankles.  Lab Results   Component Value Date    GLUCOSE 87 03/25/2021    BUN 14 03/25/2021    CREATININE 0.78 03/25/2021    EGFRIFNONA 86 03/25/2021    BCR 17.9 03/25/2021    K 4.4 03/25/2021    CO2 24.9 03/25/2021    CALCIUM 9.5 03/25/2021    ALBUMIN 4.70 03/25/2021    LABIL2 1.7 11/03/2015    AST 16 03/25/2021    ALT 21 03/25/2021     Lab Results   Component Value Date    CHOL 190 03/25/2021    TRIG 77 03/25/2021    HDL 42 03/25/2021     (H) 03/25/2021     Lab Results   Component Value Date    HGBA1C 5.28 03/25/2021     Labs  Most recent vitamin D 20.7 with a B12 of  510    The following portions of the patient's history were reviewed and updated as appropriate: allergies, current medications, past family history, past medical history, past social history, past surgical history and problem list.    Review of Systems   Constitutional: Positive for fatigue. Negative for appetite change, chills, fever and unexpected weight change.   HENT: Negative for congestion, ear pain, rhinorrhea, sneezing and sore throat.    Respiratory: Positive for cough and shortness of breath. Negative for wheezing.    Cardiovascular: Negative for chest pain, palpitations and leg swelling.   Gastrointestinal: Positive for constipation and diarrhea. Negative for abdominal pain, blood in stool, nausea and vomiting.   Genitourinary: Negative for dysuria and hematuria.   Musculoskeletal: Positive for arthralgias, back pain and myalgias. Negative for joint swelling.   Skin: Negative for rash.   Neurological: Negative for weakness, numbness and headaches.   Psychiatric/Behavioral: Positive for sleep disturbance. Negative for dysphoric mood and suicidal ideas. The patient is not nervous/anxious.      Objective   Physical Exam  Constitutional:       General: She is not in acute distress.     Appearance: Normal appearance. She is well-developed. She is not diaphoretic.      Comments: Bright and in fair spirits. No apparent distress. No pallor, jaundice, diaphoresis, or cyanosis.   HENT:      Head: Atraumatic.      Right Ear: Tympanic membrane, ear canal and external ear normal.      Left Ear: Tympanic membrane, ear canal and external ear normal.   Eyes:      Conjunctiva/sclera: Conjunctivae normal.   Neck:      Thyroid: No thyroid mass or thyromegaly.      Vascular: No carotid bruit or JVD.      Trachea: Trachea normal. No tracheal deviation.   Cardiovascular:      Rate and Rhythm: Normal rate and regular rhythm.      Heart sounds: Normal heart sounds, S1 normal and S2 normal. No murmur heard.   No gallop.     Pulmonary:      Effort: Pulmonary effort is normal.      Breath sounds: Normal breath sounds.   Abdominal:      General: Bowel sounds are normal. There is no distension or abdominal bruit.      Palpations: Abdomen is soft. There is no hepatomegaly, splenomegaly or mass.      Tenderness: There is no abdominal tenderness.      Hernia: No hernia is present.   Musculoskeletal:      Right lower leg: No edema.      Left lower leg: No edema.      Comments: Negative straight leg raise. No peripheral joint redness or warmth.  Multiple generalized trigger points   Lymphadenopathy:      Head:      Right side of head: No submental, submandibular, tonsillar, preauricular, posterior auricular or occipital adenopathy.      Left side of head: No submental, submandibular, tonsillar, preauricular, posterior auricular or occipital adenopathy.      Cervical: No cervical adenopathy.      Upper Body:      Right upper body: No supraclavicular adenopathy.      Left upper body: No supraclavicular adenopathy.   Skin:     General: Skin is warm.      Coloration: Skin is not cyanotic, jaundiced or pale.      Findings: No rash.      Nails: There is no clubbing.   Neurological:      Mental Status: She is alert and oriented to person, place, and time.      Cranial Nerves: No cranial nerve deficit.      Motor: No weakness or tremor.      Coordination: Coordination normal.      Gait: Gait normal.      Deep Tendon Reflexes:      Reflex Scores:       Tricep reflexes are 1+ on the right side and 1+ on the left side.       Bicep reflexes are 2+ on the right side and 2+ on the left side.       Brachioradialis reflexes are 1+ on the right side and 1+ on the left side.       Patellar reflexes are 2+ on the right side and 2+ on the left side.       Achilles reflexes are 2+ on the right side and 2+ on the left side.  Psychiatric:         Attention and Perception: Attention normal.         Mood and Affect: Mood normal.         Speech: Speech normal.          Behavior: Behavior normal.         Thought Content: Thought content normal.       Assessment/Plan   Problems Addressed this Visit        Cardiac and Vasculature    Essential hypertension    Hypertension: at goal. Evidence of target organ damage: none.  Encouraged to continue to work on diet and exercise plan.   Continue current medication for now.  We will consider replacing metoprolol with another antihypertensive given the possible history of asthma       Gastrointestinal Abdominal     Gastroesophageal reflux disease    IBS (irritable bowel syndrome)    Reminded regarding lifestyle modification  Continue current medication       Health Encounters    Healthcare maintenance  Encouraged to obtain a COVID-19 immunization.  Lengthy discussion regarding the potential benefits and risks.  Recommended a Pneumovax 23 and updated Tdap at a later date       Musculoskeletal and Injuries    RESOLVED: Chronic midline low back pain with sciatica    Fibromyalgia  Advised regarding symptomatic treatment.   Reviewed options and agreed on a trial of duloxetine  Encouraged to report if any worse or if any new symptoms or concerns.    Relevant Medications    DULoxetine (CYMBALTA) 60 MG capsule       Neuro    RLS (restless legs syndrome)       Pulmonary and Pneumonias    COPD mixed type (CMS/HCC)  With possible asthma  Reminded of the importance of smoking cessation  Continue current medication  As above.  Follow up with pulmonology        Tobacco    Smoker      Diagnoses       Codes Comments    Essential hypertension    -  Primary ICD-10-CM: I10  ICD-9-CM: 401.9     Irritable bowel syndrome with constipation     ICD-10-CM: K58.1  ICD-9-CM: 564.1     Irritable bowel syndrome, unspecified type     ICD-10-CM: K58.9  ICD-9-CM: 564.1     Gastroesophageal reflux disease without esophagitis     ICD-10-CM: K21.9  ICD-9-CM: 530.81     Fibromyalgia     ICD-10-CM: M79.7  ICD-9-CM: 729.1     Chronic midline low back pain with right-sided sciatica      ICD-10-CM: M54.41, G89.29  ICD-9-CM: 724.2, 724.3, 338.29     RLS (restless legs syndrome)     ICD-10-CM: G25.81  ICD-9-CM: 333.94     COPD mixed type (CMS/HCC)     ICD-10-CM: J44.9  ICD-9-CM: 496     Smoker     ICD-10-CM: F17.200  ICD-9-CM: 305.1     Healthcare maintenance     ICD-10-CM: Z00.00  ICD-9-CM: V70.0

## 2021-06-21 DIAGNOSIS — M54.40 BILATERAL LOW BACK PAIN WITH SCIATICA, SCIATICA LATERALITY UNSPECIFIED, UNSPECIFIED CHRONICITY: ICD-10-CM

## 2021-06-21 RX ORDER — CYCLOBENZAPRINE HCL 5 MG
TABLET ORAL
Qty: 90 TABLET | Refills: 0 | Status: SHIPPED | OUTPATIENT
Start: 2021-06-21 | End: 2022-02-07 | Stop reason: ALTCHOICE

## 2021-08-02 NOTE — ASSESSMENT & PLAN NOTE
NEURO ONCOLOGY CONSULT     Date of Visit: 8/2/2021    Associated Physician: Dr. Fitzpatrick med oncology and  Dr García, Rad onc ,     Referring physician; Dr. Lema department of Radiation oncologist  Reason for consultation:  Leptomeningeal disease.    Cancer Diagnosis: Cancer Diagnosis:   1. Stage IVB (cT2a, cN3,  PM1c)  NSCLC - Adenocarcinoma of Right Upper Lobe, diagnosed 2019  2. Stage IB (pT1c pN1mi, cM0), grade 1, infiltrating ductal carcinoma of the lower inner quadrant of the right breast, ER/IN positive, HER2 non-overexpressing diagnosed in 2010.      History of Prior Radiation Therapy:   1. Right breast: 4680 cGy in 26 fractions with additional Right breast boost of 1600 cGy in 8 fractions from 2/28/2011 to 4/15/2011.  2. Right Femur/Hip: 3000 cGy in 10 fractions from 1/22/20 to 2/4/20.  3. Right occipital metastasis: 2100 cGy in 1 fraction, completed 11/13/2020  4. C1-C5 spine: 800 cGy in 1 fraction, completed 11/13/2020    Interval History:  Patient has a history of breast cancer, which was diagnosed in August 2010. Patient had image guided right breast biopsy.  Which showed grade 1 infiltrating ductal carcinoma .  ER and IN were both positive.  Patient had a postoperative chemotherapy including Adriamycin Cytoxan for 4 cycles followed by dose dense Taxol for 4 cycles.  Post chemotherapy patient also received hormonal therapy which ended in March 2021.     End of 201920, patient was stage IV non-small cell lung cancer right upper lobe adenocarcinoma.  In October 2020, patient had MRI of the brain done which showed lesion in occipital lobe.  Patient underwent single fraction really SRS for the occipital lobe lesion.    Since completion of radiation she has done well, denies any new intracranial symptoms or new pain. She denies vision changes, headaches, nausea, weakness. 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     She has had  Patient scheduled next week with GI.  I have encouraged her to call today to see if she can get in earlier and report to her GI provider that she has had some blood in her stool.  Patient reports that she had some bright red bleeding in her stool yesterday.  Her recent CMP and CBC were relatively normal.  Continue drinking boost and avoid irritating foods.   significant stress associated with her 's coronary bypass, but he is improving.    MRI brain 7/28/2021 revealed concern for leptomeningeal enhancement in the right occipital lobe and posterior right temporal lobe, with a new 1.5 cm left inferior frontal gyrus cystic/necrotic lesion.    Patient has been referred to us for further care management and treatment options.        Past Medical History:  Past Medical History:   Diagnosis Date   • Abscess of buttock 4/16/2021   • Cancer of upper lobe of right lung (CMS/HCC) 11/2019    [facility] - CXR done   • Encounter for long-term (current) use of other medications 1/28/2013   • Fracture 1980    left ankle   • Hypercholesterolemia    • Hypertension    • Malignant neoplasm of lower-inner quadrant of female breast (CMS/HCC) 09/14/2010    Right breast infiltrating ductal carcinoma, ER/[place] (+)   • Metastasis (CMS/HCC)     bone and lung   • Multiple pulmonary nodules    • Screening for glaucoma 1/28/2013   • Sleep apnea     wears CPAP       Past Surgical History:  Past Surgical History:   Procedure Laterality Date   • Ankle surgery  02/01/1981    pins removed   • Bronchoscopy  12/26/2019   • Dexa bone density axial skeleton  3/22/13    normal   • Fracture surgery  1980    ORIF left ankle // pins placed   • Incision and drainage  11/05/2010    right breast infection- post-op   • Ir chest port line insertion age 5 or older Left 11/30/2020   • Ir implantable port vein access  09/2010    Right breast cancer metastases // removed 1 yr later   • Mastectomy partial  09/20/2010    Right Breast Lumpectomy sentinal node biopsy   • Removal of ovary/tube(s)  11/28/11    Ovary and Tube, removal   • Remove armpit lymph nodes superfic  9/27/2010       Current Medications:  Current Outpatient Medications   Medication Sig Dispense Refill   • levothyroxine 50 MCG tablet Take 1 tablet by mouth daily. 30 tablet 3   • gabapentin (NEURONTIN) 300 MG capsule Take 3 capsules by mouth 3 times daily.  Dose: 3 capsules (=900mg) 540 capsule 3   • potassium CHLORIDE (KLOR-CON M) 20 MEQ cecelia ER tablet Take 1 tablet by mouth 2 times daily. 180 tablet 3   • lisinopril (ZESTRIL) 20 MG tablet Take 1 tablet by mouth daily.     • Cholecalciferol 50 mcg (2,000 units) tablet Take 5,000 Units by mouth daily.     • Specialty Vitamins Products (magnesium, amino acid chelate,) 133 MG tablet Take 2 tablets by mouth 2 times daily.      • lovastatin (MEVACOR) 20 MG tablet Take 20 mg by mouth nightly.     • levetiracetam (KEPPRA) 1000 MG tablet Take 0.5 tablets by mouth 2 times daily. 60 tablet 3     No current facility-administered medications for this visit.     Facility-Administered Medications Ordered in Other Visits   Medication Dose Route Frequency Provider Last Rate Last Admin   • heparin 100 UNIT/ML lock flush 500 Units  5 mL Intracatheter PRN Sara Gillespie NP   500 Units at 12/15/20 1005       Allergies:  ALLERGIES:   Allergen Reactions   • Penicillins HIVES and RASH       Family History:  family history includes Cancer in her father, maternal uncle, and paternal grandfather; Cataracts in her maternal grandmother and paternal grandmother; Glaucoma in her father; Heart disease in her father; Macular degeneration in her mother; Myocardial Infarction in her father; Patient is unaware of any medical problems in her brother and sister.    Social History:  Social History     Socioeconomic History   • Marital status: /Civil Union     Spouse name: jonathan   • Number of children: 0   • Years of education: Not on file   • Highest education level: Not on file   Occupational History   • Occupation: Teacher     Employer: [employer]     Comment: [employer]   Tobacco Use   • Smoking status: Former Smoker     Packs/day: 0.10     Years: 20.00     Pack years: 2.00     Types: Cigarettes     Quit date: 2003     Years since quittin.5   • Smokeless tobacco: Never Used   • Tobacco comment: \    Substance and Sexual Activity   • Alcohol use: Not Currently     Alcohol/week: 3.0 standard drinks     Types: 3 Standard drinks or equivalent per week     Comment: social    • Drug use: No   • Sexual activity: Not on file   Other Topics Concern   • Not on file   Social History Narrative    .  No pets/birds.     Social Determinants of Health     Financial Resource Strain:    • Social Determinants: Financial Resource Strain:    Food Insecurity:    • Social Determinants: Food Insecurity:    Transportation Needs:    • Lack of Transportation (Medical):    • Lack of Transportation (Non-Medical):    Physical Activity:    • Days of Exercise per Week:    • Minutes of Exercise per Session:    Stress:    • Social Determinants: Stress:    Social Connections:    • Social Determinants: Social Connections:    Intimate Partner Violence: Not At Risk   • Social Determinants: Intimate Partner Violence Past Fear: No   • Social Determinants: Intimate Partner Violence Current Fear: No       Review of Systems:  I have reviewed the ROS as recorded by the nursing staff at today's visit and discussed the pertinent positives and negatives with the patient.     Physical Exam:  Visit Vitals  BP 98/70 (BP Location: LUE - Left upper extremity, Patient Position: Sitting, Cuff Size: Large Adult)   Pulse (!) 102   Temp 97.6 °F (36.4 °C) (Temporal)   Ht 5' 9\" (1.753 m)   Wt 93.7 kg   LMP  (LMP Unknown)   SpO2 99%   BMI 30.51 kg/m²     GENERAL: appears stated age, is in no apparent distress, is well developed  and well nourished. Sitting in a wheelchair related to her significant neuropathy  EYES: conjunctivae normal and anicteric sclerae  HENT: normal dentition, tongue midline and bilateral external ears normal  PSYCH: normal mood and affect and speech and behavior appropriate  SKIN: normal color, warm and dry to touch  NECK: neck is supple, full range of motion, no masses    NEUROLOGIC: normal mental status, cranial nerves 2 through 12  grossly normal, strength intact and symmetric, no focal deficits noted and no tremor noted.   MUSCULOSKELETAL: no clubbing, no cyanosis and normal muscle tone and development bilaterally, upper and lower extremities symmetric  Patient has bilateral footdrop more so on the right lower extremity.  Patient also has some sensory changes in both lower extremities starting from mid calf downward.      ECOG Performance Status: 1: Fully active, able to carry on all pre-disease performance without restriction    Review of Imaging:  As noted in the History of Present Illness.  The recent imaging was personally reviewed.    MRI Brain 7/28/2021  IMPRESSION:      1.  New leptomeningeal disease overlying the right occipital lobe and  posterior right temporal lobe.  2.  New 1.5 x 1.0 x 1.4 cm centrally cystic/necrotic lesion, presumably  metastasis in the left inferior frontal gyrus.    CT Chest abdomen pelvis 7/7/2021  Impression:  1. No evidence of disease progression.     2. Unchanged sclerotic osseous metastases.     3. Unchanged right hilar mass with right upper lobe collapse.     4. Unchanged small bilateral pulmonary nodules.     5. Unchanged low-attenuation lesion in the right hepatic lobe consistent  with metastatic deposit.      Impression:  Kelley Hoover is a 56 year old female with a widely metastatic lung adenocarcionma, EGFR mutant, with progression on afatinib with a single right occipital brain metastasis, and multiple osseous metastases including painless lytic lesions in C2 and C4. Now s/p SRS to the right occipital metastasis, 21 Gy in 1 fraction, as well as EBRT to C1-C5 to 8 Gy in 1 fraction, both completed 11/13/2020.    She is now on carbo/taxol/atezolizumab/bevacizumab therapy with Dr. Fitzpatrick, tolerating it well aside from significant neuropathy.    MRI brain today is concerning for leptomeningeal disease in the right occipital lobe/posterior temporal lobe, as well as a new left frontal cavitary  metastasis.    I had a long conversation regarding patient and her current situation and MRI findings.  Patient had leptomeningeal disease and we discussed intrathecal chemotherapy.  We also do recommending spinal tap for CSF analysis.  We discussed several options of chemotherapy including chemotherapy through spinal tap or chemotherapy through [place] [facility].  If patient CSF studies are positive I will suggest using chemotherapy 1st to clear the CSF.  Patient case will be discussed in Neuro-Oncology multidisciplinary Clinic tomorrow morning.  We will inform the patient regarding the outcome of her review.  Our recommendations were discussed with referring team also.    Sherwin La MD  Neuro-Oncology

## 2021-11-19 DIAGNOSIS — I10 ESSENTIAL HYPERTENSION: ICD-10-CM

## 2021-11-19 RX ORDER — METOPROLOL SUCCINATE 25 MG/1
TABLET, EXTENDED RELEASE ORAL
Qty: 30 TABLET | Refills: 0 | Status: SHIPPED | OUTPATIENT
Start: 2021-11-19

## 2022-01-28 ENCOUNTER — HOSPITAL ENCOUNTER (OUTPATIENT)
Dept: GENERAL RADIOLOGY | Facility: HOSPITAL | Age: 32
Discharge: HOME | End: 2022-01-28
Admitting: UROLOGY

## 2022-01-28 ENCOUNTER — OFFICE VISIT (OUTPATIENT)
Dept: UROLOGY | Facility: CLINIC | Age: 32
End: 2022-01-28

## 2022-01-28 VITALS — HEIGHT: 60 IN | WEIGHT: 140 LBS | BODY MASS INDEX: 27.48 KG/M2

## 2022-01-28 DIAGNOSIS — N20.0 RENAL CALCULI: ICD-10-CM

## 2022-01-28 DIAGNOSIS — N20.0 KIDNEY STONES: Primary | ICD-10-CM

## 2022-01-28 DIAGNOSIS — D35.00 ADRENAL ADENOMA, UNSPECIFIED LATERALITY: ICD-10-CM

## 2022-01-28 PROCEDURE — 74018 RADEX ABDOMEN 1 VIEW: CPT | Performed by: RADIOLOGY

## 2022-01-28 PROCEDURE — 74018 RADEX ABDOMEN 1 VIEW: CPT

## 2022-01-28 PROCEDURE — 99203 OFFICE O/P NEW LOW 30 MIN: CPT | Performed by: UROLOGY

## 2022-01-28 RX ORDER — TAMSULOSIN HYDROCHLORIDE 0.4 MG/1
1 CAPSULE ORAL NIGHTLY
Qty: 30 CAPSULE | Refills: 5 | Status: SHIPPED | OUTPATIENT
Start: 2022-01-28 | End: 2022-11-30 | Stop reason: SDUPTHER

## 2022-01-31 PROBLEM — D35.00 ADRENAL ADENOMA: Status: ACTIVE | Noted: 2022-01-31

## 2022-01-31 PROBLEM — N20.0 RENAL CALCULI: Status: ACTIVE | Noted: 2022-01-31

## 2022-02-07 ENCOUNTER — OFFICE VISIT (OUTPATIENT)
Dept: ENDOCRINOLOGY | Facility: CLINIC | Age: 32
End: 2022-02-07

## 2022-02-07 ENCOUNTER — LAB (OUTPATIENT)
Dept: LAB | Facility: HOSPITAL | Age: 32
End: 2022-02-07

## 2022-02-07 VITALS
SYSTOLIC BLOOD PRESSURE: 118 MMHG | HEART RATE: 82 BPM | DIASTOLIC BLOOD PRESSURE: 74 MMHG | WEIGHT: 138 LBS | BODY MASS INDEX: 27.09 KG/M2 | OXYGEN SATURATION: 97 % | HEIGHT: 60 IN

## 2022-02-07 DIAGNOSIS — E04.1 SOLITARY THYROID NODULE: ICD-10-CM

## 2022-02-07 DIAGNOSIS — N20.0 RENAL CALCULI: ICD-10-CM

## 2022-02-07 DIAGNOSIS — D35.01 ADENOMA OF RIGHT ADRENAL GLAND: Primary | ICD-10-CM

## 2022-02-07 DIAGNOSIS — D35.01 ADENOMA OF RIGHT ADRENAL GLAND: ICD-10-CM

## 2022-02-07 LAB — PTH-INTACT SERPL-MCNC: 30.1 PG/ML (ref 15–65)

## 2022-02-07 PROCEDURE — 82024 ASSAY OF ACTH: CPT

## 2022-02-07 PROCEDURE — 82627 DEHYDROEPIANDROSTERONE: CPT

## 2022-02-07 PROCEDURE — 36415 COLL VENOUS BLD VENIPUNCTURE: CPT

## 2022-02-07 PROCEDURE — 99204 OFFICE O/P NEW MOD 45 MIN: CPT | Performed by: INTERNAL MEDICINE

## 2022-02-07 PROCEDURE — 83970 ASSAY OF PARATHORMONE: CPT

## 2022-02-07 PROCEDURE — 84244 ASSAY OF RENIN: CPT

## 2022-02-07 PROCEDURE — 84439 ASSAY OF FREE THYROXINE: CPT

## 2022-02-07 PROCEDURE — 76536 US EXAM OF HEAD AND NECK: CPT | Performed by: INTERNAL MEDICINE

## 2022-02-07 PROCEDURE — 80053 COMPREHEN METABOLIC PANEL: CPT

## 2022-02-07 PROCEDURE — 82533 TOTAL CORTISOL: CPT

## 2022-02-07 PROCEDURE — 82384 ASSAY THREE CATECHOLAMINES: CPT

## 2022-02-07 PROCEDURE — 84403 ASSAY OF TOTAL TESTOSTERONE: CPT

## 2022-02-07 PROCEDURE — 84480 ASSAY TRIIODOTHYRONINE (T3): CPT

## 2022-02-07 PROCEDURE — 84443 ASSAY THYROID STIM HORMONE: CPT

## 2022-02-07 PROCEDURE — 82088 ASSAY OF ALDOSTERONE: CPT

## 2022-02-07 RX ORDER — AMITRIPTYLINE HYDROCHLORIDE 25 MG/1
25 TABLET, FILM COATED ORAL NIGHTLY
COMMUNITY

## 2022-02-07 NOTE — ASSESSMENT & PLAN NOTE
Will need full assessment for functionality- test for pheo, cushing's syndrome, conn's syndrome,  And excessive androgen production.  The fact that she has a thyroid nodule and kidney stones raises the specter of MEN 2-   MTC and hyperparthyroidism.   So if she has hyperpara, she will need genetic testing for men2

## 2022-02-07 NOTE — PROGRESS NOTES
"     Office Note      Date: 2022  Patient Name: Elisabeth Hall  MRN: [id number]  : 1990    Chief Complaint   Patient presents with   • Adrenal Problem       History of Present Illness:   Elisabeth Hall is a 32 y.o. female who presents for Adrenal Problem  she is seen as a new patient today  ---------------  She was found to have an incidentally discovered 1.4 cm right adrenal nodule  She notes htn and acne.  She has weight loss.  Her grandmother had adrenal cancer.  --------  She was found to have a left thyroid nodule and abnormal tft's . She was put on thyroid medication and it made her worse.  She had ct of her head because her labs suggested a pituitary issue. It was normal.  ------  She has had kidney stones in past    She has family with hashimoto's     Subjective        Review of Systems:   Review of Systems   Constitutional: Positive for fatigue and unexpected weight change.   Gastrointestinal: Positive for abdominal pain.   Musculoskeletal: Positive for joint swelling.   Neurological: Positive for tremors.   Psychiatric/Behavioral: Positive for sleep disturbance.       The following portions of the patient's history were reviewed and updated as appropriate: allergies, current medications, past family history, past medical history, past social history, past surgical history and problem list.    Objective     Visit Vitals  /74   Pulse 82   Ht 152.4 cm (60\")   Wt 62.6 kg (138 lb)   LMP 2020   SpO2 97%   BMI 26.95 kg/m²       Labs:    CBC w/DIFF  Lab Results   Component Value Date    WBC 9.39 2021    RBC 4.71 2021    HGB 14.9 2021    HCT 44.5 2021    MCV 94.5 2021    MCH 31.6 2021    MCHC 33.5 2021    RDW 12.8 2021    RDWSD 44.5 2021    MPV 9.4 2021     2021    NEUTRORELPCT 59.4 2021    LYMPHORELPCT 28.1 2021    MONORELPCT 6.0 2021    EOSRELPCT 5.2 2021    BASORELPCT 1.0 2021    " AUTOIGPER 0.3 03/25/2021    NEUTROABS 5.58 03/25/2021    LYMPHSABS 2.64 03/25/2021    MONOSABS 0.56 03/25/2021    EOSABS 0.49 (H) 03/25/2021    BASOSABS 0.09 03/25/2021    AUTOIGNUM 0.03 03/25/2021    NRBC 0.0 03/25/2021       T4  No results found for: FREET4    TSH  No results found for: TSHBASE     Physical Exam:  Physical Exam  Vitals reviewed.   HENT:      Head: Normocephalic and atraumatic.   Eyes:      Extraocular Movements: Extraocular movements intact.   Neck:      Comments: Fullness in left thyroid bed   Cardiovascular:      Rate and Rhythm: Normal rate and regular rhythm.   Pulmonary:      Effort: Pulmonary effort is normal. No respiratory distress.   Lymphadenopathy:      Cervical: No cervical adenopathy.   Neurological:      Mental Status: She is alert.   Psychiatric:         Mood and Affect: Mood normal.         Thought Content: Thought content normal.         Judgment: Judgment normal.         Assessment / Plan      Assessment & Plan:  Problem List Items Addressed This Visit        Other    Renal calculi    Current Assessment & Plan      Could be due to hyperpara          Relevant Orders    Comprehensive Metabolic Panel    PTH, Intact    Adrenal adenoma - Primary    Overview     1.4 cm right. Incidentally discovered on CT for upper abdominal pain in last 2021         Current Assessment & Plan     Will need full assessment for functionality- test for pheo, cushing's syndrome, conn's syndrome,  And excessive androgen production.  The fact that she has a thyroid nodule and kidney stones raises the specter of MEN 2-   MTC and hyperparthyroidism.   So if she has hyperpara, she will need genetic testing for men2          Relevant Orders    Renin Direct Assay    Aldosterone    ACTH    Cortisol    Testosterone    DHEA-Sulfate    Catecholamines, Fractionated, Plasma    Solitary thyroid nodule    Overview     Small left thyroid nodule found incidentally during head ct         Current Assessment & Plan     For  follow up of the thyroid nodule I did a real time poc ultrasound of the thyroid today      Right lobe was normal in size and echogenicity  Left lobe was normal in size. In the left lobe is a 7 mm hypoechoic nodule     Impression- sub cm nodule left lobe      Plan- repeat ultrasound in a year  And due to prior abnormal thyorid levels will recheck them          Relevant Medications    metoprolol succinate XL (TOPROL-XL) 25 MG 24 hr tablet    Other Relevant Orders    T4, Free    TSH    T3    US Thyroid (Completed)           Sacha [place] MD   02/07/2022

## 2022-02-07 NOTE — ASSESSMENT & PLAN NOTE
For follow up of the thyroid nodule I did a real time poc ultrasound of the thyroid today      Right lobe was normal in size and echogenicity  Left lobe was normal in size. In the left lobe is a 7 mm hypoechoic nodule     Impression- sub cm nodule left lobe      Plan- repeat ultrasound in a year  And due to prior abnormal thyorid levels will recheck them

## 2022-02-08 LAB
ALBUMIN SERPL-MCNC: 5.1 G/DL (ref 3.5–5.2)
ALBUMIN/GLOB SERPL: 1.8 G/DL
ALP SERPL-CCNC: 78 U/L (ref 39–117)
ALT SERPL W P-5'-P-CCNC: 18 U/L (ref 1–33)
ANION GAP SERPL CALCULATED.3IONS-SCNC: 13 MMOL/L (ref 5–15)
AST SERPL-CCNC: 19 U/L (ref 1–32)
BILIRUB SERPL-MCNC: 0.2 MG/DL (ref 0–1.2)
BUN SERPL-MCNC: 9 MG/DL (ref 6–20)
BUN/CREAT SERPL: 14.3 (ref 7–25)
CALCIUM SPEC-SCNC: 9.5 MG/DL (ref 8.6–10.5)
CHLORIDE SERPL-SCNC: 103 MMOL/L (ref 98–107)
CO2 SERPL-SCNC: 23 MMOL/L (ref 22–29)
CORTIS SERPL-MCNC: 4.75 MCG/DL
CREAT SERPL-MCNC: 0.63 MG/DL (ref 0.57–1)
GFR SERPL CREATININE-BSD FRML MDRD: 109 ML/MIN/1.73
GLOBULIN UR ELPH-MCNC: 2.8 GM/DL
GLUCOSE SERPL-MCNC: 76 MG/DL (ref 65–99)
POTASSIUM SERPL-SCNC: 4 MMOL/L (ref 3.5–5.2)
PROT SERPL-MCNC: 7.9 G/DL (ref 6–8.5)
SODIUM SERPL-SCNC: 139 MMOL/L (ref 136–145)
T3 SERPL-MCNC: 105 NG/DL (ref 80–200)
T4 FREE SERPL-MCNC: 1.15 NG/DL (ref 0.93–1.7)
TESTOST SERPL-MCNC: 14.4 NG/DL (ref 8.4–48.1)
TSH SERPL DL<=0.05 MIU/L-ACNC: 0.68 UIU/ML (ref 0.27–4.2)

## 2022-02-09 LAB
ACTH PLAS-MCNC: 4.3 PG/ML (ref 7.2–63.3)
DHEA-S SERPL-MCNC: 210 UG/DL (ref 84.8–378)

## 2022-02-11 LAB
DOPAMINE SERPL-MCNC: 85 PG/ML (ref 0–48)
EPINEPH PLAS-MCNC: 36 PG/ML (ref 0–62)
NOREPINEPH PLAS-MCNC: 1092 PG/ML (ref 0–874)
RENIN PLAS-CCNC: 0.94 NG/ML/HR (ref 0.17–5.38)

## 2022-02-12 LAB — ALDOST SERPL-MCNC: 9.5 NG/DL (ref 0–30)

## 2022-02-14 ENCOUNTER — HOSPITAL ENCOUNTER (OUTPATIENT)
Dept: MRI IMAGING | Facility: HOSPITAL | Age: 32
Discharge: HOME | End: 2022-02-14
Admitting: UROLOGY

## 2022-02-14 DIAGNOSIS — N20.0 KIDNEY STONES: ICD-10-CM

## 2022-02-14 DIAGNOSIS — D35.01 ADENOMA OF RIGHT ADRENAL GLAND: Primary | ICD-10-CM

## 2022-02-14 PROCEDURE — 74181 MRI ABDOMEN W/O CONTRAST: CPT | Performed by: RADIOLOGY

## 2022-02-14 PROCEDURE — 74181 MRI ABDOMEN W/O CONTRAST: CPT

## 2022-02-15 ENCOUNTER — OFFICE VISIT (OUTPATIENT)
Dept: GASTROENTEROLOGY | Facility: CLINIC | Age: 32
End: 2022-02-15

## 2022-02-15 VITALS
HEIGHT: 60 IN | HEART RATE: 75 BPM | WEIGHT: 137.8 LBS | SYSTOLIC BLOOD PRESSURE: 112 MMHG | BODY MASS INDEX: 27.05 KG/M2 | DIASTOLIC BLOOD PRESSURE: 74 MMHG

## 2022-02-15 DIAGNOSIS — R11.0 NAUSEA: ICD-10-CM

## 2022-02-15 DIAGNOSIS — R14.0 BLOATING: ICD-10-CM

## 2022-02-15 DIAGNOSIS — R10.13 EPIGASTRIC PAIN: Primary | ICD-10-CM

## 2022-02-15 PROCEDURE — 99214 OFFICE O/P EST MOD 30 MIN: CPT | Performed by: PHYSICIAN ASSISTANT

## 2022-02-15 NOTE — PROGRESS NOTES
Chief Complaint   Patient presents with   • Bloated       Elisabeth Hall is a 32 y.o. female who presents to the office today for evaluation of Bloated  .    HPI  Patient presents the clinic today for follow-up of abdominal pain, bloating and nausea.  Patient states this is an ongoing issue for the last couple years and comes and goes and episodes.  Patient states her last episode lasted roughly 4 months and she was only able to consume boost.  She could eat at all without having a lot of abdominal swelling and fullness in the epigastric area.  Patient does admit to having heartburn/reflux symptoms as well.  Patient states when she does eat she feels like it sits on her stomach for quite some time.  During that time she may become nauseated-denies any vomiting episodes.  She has not had a gastric emptying scan performed.  Has had a prior EGD/colonoscopy done to evaluate symptoms that was nondiagnostic.    Review of Systems   Constitutional: Positive for appetite change. Negative for chills, fatigue, fever and unexpected weight change.   HENT: Negative for trouble swallowing.    Eyes: Negative.    Respiratory: Negative for cough, choking, chest tightness and shortness of breath.    Cardiovascular: Negative for chest pain.   Gastrointestinal: Positive for abdominal distention, abdominal pain, constipation and nausea. Negative for anal bleeding, blood in stool, diarrhea and vomiting.   Endocrine: Negative.    Genitourinary: Negative for difficulty urinating.   Musculoskeletal: Positive for back pain. Negative for neck pain.   Skin: Negative.    Allergic/Immunologic: Negative for environmental allergies and food allergies.   Neurological: Negative for light-headedness and headaches.   Hematological: Does not bruise/bleed easily.   Psychiatric/Behavioral: Negative.        ACTIVE PROBLEMS:   Specialty Problems        Gastroenterology Problems    IBS (irritable bowel syndrome)        Gastroesophageal reflux disease               PAST MEDICAL HISTORY:  Past Medical History:   Diagnosis Date   • Anemia 2017   • Arthritis 2009   • Constipation    • COPD (chronic obstructive pulmonary disease) (HCC)    • Esophagitis, reflux    • Fatty liver 2/10/2020    [facility]   • GERD (gastroesophageal reflux disease) 2009   • Hypercholesterolemia    • Hypertension    • Hypothyroidism 6/2021    Secondary   • Irritable bowel    • Kidney stone    • Lumbago    • Memory loss    • Restless legs    • Solitary thyroid nodule 2/7/2022   • Thyroid nodule 08/2021   • Vitamin D deficiency 12/2020       SURGICAL HISTORY:  Past Surgical History:   Procedure Laterality Date   • ANKLE SURGERY     • CHOLECYSTECTOMY     • COLONOSCOPY  2011   • COLONOSCOPY N/A 8/31/2020    Procedure: COLONOSCOPY CPT CODE: 88425;  Surgeon: Bhupinder Orta MD;  Location: [facility] OR;  Service: Gastroenterology;  Laterality: N/A;   • ENDOSCOPY N/A 8/31/2020    Procedure: ESOPHAGOGASTRODUODENOSCOPY WITH BIOPSY CPT CODE: 32051;  Surgeon: Bhupinder Orta MD;  Location: [facility] OR;  Service: Gastroenterology;  Laterality: N/A;   • HYSTERECTOMY     • TUBAL ABDOMINAL LIGATION     • UPPER GASTROINTESTINAL ENDOSCOPY  02/27/2020    [facility]       FAMILY HISTORY:  Family History   Problem Relation Age of Onset   • Diabetes Mother    • Hypertension Mother    • Kidney disease Mother    • Thyroid disease Mother    • Arthritis Father    • Diabetes Father    • Hypertension Father    • Birth defects Sister    • Kidney disease Sister    • Diabetes Sister    • Asthma Brother    • Diabetes Brother    • Hypertension Brother    • Thyroid disease Brother    • COPD Maternal Uncle    • Cancer Maternal Uncle         Bladder   • Colon cancer Maternal Uncle    • Arthritis Maternal Grandmother    • Cancer Maternal Grandmother         Kidney and liver   • Diabetes Maternal Grandmother    • Hypertension Maternal Grandmother    • Liver disease Maternal Grandmother    • Cirrhosis  "Maternal Grandmother    • Liver cancer Maternal Grandmother    • Breast cancer Maternal Aunt    • Cancer Maternal Uncle         Lung and bone   • Cancer Paternal Grandmother         Colon   • Colon cancer Paternal Grandmother    • Cirrhosis Maternal Grandfather        SOCIAL HISTORY:  Social History     Tobacco Use   • Smoking status: Current Every Day Smoker     Packs/day: 1.00     Years: 15.00     Pack years: 15.00     Types: Cigarettes   • Smokeless tobacco: Never Used   Substance Use Topics   • Alcohol use: No       CURRENT MEDICATION:    Current Outpatient Medications:   •  albuterol sulfate  (90 Base) MCG/ACT inhaler, Inhale 2 puffs Every 4 (Four) Hours As Needed for Shortness of Air., Disp: 18 g, Rfl: 2  •  amitriptyline (ELAVIL) 25 MG tablet, Take 25 mg by mouth Every Night., Disp: , Rfl:   •  estradiol (ESTRACE VAGINAL) 0.1 MG/GM vaginal cream, Daily x7 days then twice weekly, Disp: 1 each, Rfl: 12  •  Fluticasone Furoate-Vilanterol (Breo Ellipta) 100-25 MCG/INH inhaler, Inhale 1 puff Daily., Disp: 1 inhaler, Rfl: 5  •  linaclotide (LINZESS) 290 MCG capsule capsule, Take 1 capsule by mouth Every Morning Before Breakfast., Disp: 48 capsule, Rfl: 0  •  metoprolol succinate XL (TOPROL-XL) 25 MG 24 hr tablet, Take 1 tablet by mouth once daily, Disp: 30 tablet, Rfl: 0  •  tamsulosin (Flomax) 0.4 MG capsule 24 hr capsule, Take 1 capsule by mouth Every Night., Disp: 30 capsule, Rfl: 5    ALLERGIES:  Azithromycin    VISIT VITALS:  /74 (BP Location: Left arm, Patient Position: Sitting, Cuff Size: Adult)   Pulse 75   Ht 152.4 cm (60\")   Wt 62.5 kg (137 lb 12.8 oz)   LMP 08/25/2020   BMI 26.91 kg/m²   Physical Exam  Constitutional:       General: She is not in acute distress.     Appearance: Normal appearance. She is well-developed.   HENT:      Head: Normocephalic and atraumatic.   Eyes:      Pupils: Pupils are equal, round, and reactive to light.   Cardiovascular:      Rate and Rhythm: Normal rate " and regular rhythm.      Heart sounds: Normal heart sounds.   Pulmonary:      Effort: Pulmonary effort is normal. No respiratory distress.      Breath sounds: Normal breath sounds. No wheezing, rhonchi or rales.   Abdominal:      General: Abdomen is flat. Bowel sounds are normal. There is no distension.      Palpations: Abdomen is soft. There is no mass.      Tenderness: There is no abdominal tenderness. There is no guarding or rebound.      Hernia: No hernia is present.   Musculoskeletal:         General: No swelling. Normal range of motion.      Cervical back: Normal range of motion and neck supple.      Right lower leg: No edema.      Left lower leg: No edema.   Skin:     General: Skin is warm and dry.   Neurological:      Mental Status: She is alert and oriented to person, place, and time.   Psychiatric:         Attention and Perception: Attention normal.         Mood and Affect: Mood normal.         Speech: Speech normal.         Behavior: Behavior normal. Behavior is cooperative.         Thought Content: Thought content normal.           Assessment/Plan   Patient symptoms-we will go ahead and get her scheduled had a gastric emptying scan performed to evaluate for possible gastroparesis.  Seen back in July 2020 and prior provider suspected gastroparesis-we will look at starting Reglan regimen and gastroparesis diet if positive.   Diagnosis Plan   1. Epigastric pain  NM Gastric Emptying   2. Bloating  NM Gastric Emptying   3. Nausea  NM Gastric Emptying       Return in about 4 weeks (around 3/15/2022).                   This document has been electronically signed by Grisel See PA-C  February 15, 2022 15:41 EST    Part of this note may be an electronic transcription/translation of spoken language to printed text using the Dragon Dictation System.

## 2022-02-23 ENCOUNTER — TELEPHONE (OUTPATIENT)
Dept: FAMILY MEDICINE CLINIC | Facility: CLINIC | Age: 32
End: 2022-02-23

## 2022-02-23 NOTE — TELEPHONE ENCOUNTER
ANISH FROM [employer] CALLED AND WOULD LIKE TO KNOW IF WE HAVE RECEIVED CLAIM FORM FOR [employer] THAT WAS FAXED ON 2/17    PLEASE ADVISE.  CALL BACK: [phone]

## 2022-02-26 PROCEDURE — 81050 URINALYSIS VOLUME MEASURE: CPT | Performed by: INTERNAL MEDICINE

## 2022-02-26 PROCEDURE — 83835 ASSAY OF METANEPHRINES: CPT | Performed by: INTERNAL MEDICINE

## 2022-02-26 PROCEDURE — 82530 CORTISOL FREE: CPT | Performed by: INTERNAL MEDICINE

## 2022-02-28 ENCOUNTER — HOSPITAL ENCOUNTER (OUTPATIENT)
Dept: NUCLEAR MEDICINE | Facility: HOSPITAL | Age: 32
Discharge: HOME | End: 2022-02-28

## 2022-02-28 DIAGNOSIS — R11.0 NAUSEA: ICD-10-CM

## 2022-02-28 DIAGNOSIS — R14.0 BLOATING: ICD-10-CM

## 2022-02-28 DIAGNOSIS — R10.13 EPIGASTRIC PAIN: ICD-10-CM

## 2022-02-28 PROCEDURE — 78264 GASTRIC EMPTYING IMG STUDY: CPT

## 2022-02-28 PROCEDURE — A9541 TC99M SULFUR COLLOID: HCPCS | Performed by: PHYSICIAN ASSISTANT

## 2022-02-28 PROCEDURE — 0 TECHNETIUM SULFUR COLLOID: Performed by: PHYSICIAN ASSISTANT

## 2022-02-28 PROCEDURE — 78264 GASTRIC EMPTYING IMG STUDY: CPT | Performed by: RADIOLOGY

## 2022-02-28 RX ADMIN — TECHNETIUM TC 99M SULFUR COLLOID 1 DOSE: KIT at 12:45

## 2022-03-21 ENCOUNTER — OFFICE VISIT (OUTPATIENT)
Dept: GASTROENTEROLOGY | Facility: CLINIC | Age: 32
End: 2022-03-21

## 2022-03-21 VITALS
HEIGHT: 60 IN | OXYGEN SATURATION: 98 % | BODY MASS INDEX: 27.09 KG/M2 | DIASTOLIC BLOOD PRESSURE: 83 MMHG | WEIGHT: 138 LBS | SYSTOLIC BLOOD PRESSURE: 119 MMHG | HEART RATE: 83 BPM

## 2022-03-21 DIAGNOSIS — K59.04 CHRONIC IDIOPATHIC CONSTIPATION: ICD-10-CM

## 2022-03-21 DIAGNOSIS — R11.2 INTRACTABLE VOMITING WITH NAUSEA, UNSPECIFIED VOMITING TYPE: Primary | ICD-10-CM

## 2022-03-21 PROCEDURE — 99213 OFFICE O/P EST LOW 20 MIN: CPT | Performed by: PHYSICIAN ASSISTANT

## 2022-03-21 RX ORDER — PROMETHAZINE HYDROCHLORIDE 25 MG/1
25 TABLET ORAL EVERY 6 HOURS PRN
Qty: 30 TABLET | Refills: 0 | Status: SHIPPED | OUTPATIENT
Start: 2022-03-21

## 2022-03-21 RX ORDER — PRUCALOPRIDE 2 MG/1
2 TABLET, FILM COATED ORAL DAILY
Qty: 90 TABLET | Refills: 3 | Status: SHIPPED | OUTPATIENT
Start: 2022-03-21 | End: 2022-08-31

## 2022-03-21 NOTE — PROGRESS NOTES
Chief Complaint   Patient presents with   • Abdominal Pain   • Nausea   • Constipation       Elisabeth Hall is a 32 y.o. female who presents to the office today for evaluation of Abdominal Pain, Nausea, and Constipation  .    HPI  Patient presents to the clinic today for evaluation of abdominal pain, nausea and chronic constipation.  She was last seen in clinic roughly 1 month ago in which a gastric emptying scan was ordered.  Results of that procedure came back normal.  Patient states her symptoms have remained the same.  She is still having a lot of abdominal bloating as well as occasional abdominal pain that occurs in the epigastric area.  Patient is currently taking Linzess 290 mcg for constipation-the medication seems to work somewhat however at times she feels like she does not evacuate her colon well.  Patient has been having episodes of nausea especially after eating due to the feeling of food sitting on her stomach for long periods of time.    Review of Systems   Constitutional: Positive for appetite change. Negative for chills, fatigue, fever and unexpected weight change.   HENT: Negative for trouble swallowing.    Eyes: Negative.    Respiratory: Negative for cough, choking, chest tightness and shortness of breath.    Cardiovascular: Negative for chest pain.   Gastrointestinal: Positive for abdominal distention, abdominal pain, constipation and nausea. Negative for anal bleeding, blood in stool, diarrhea and vomiting.   Endocrine: Negative.    Genitourinary: Negative for difficulty urinating.   Musculoskeletal: Positive for back pain. Negative for neck pain.   Skin: Negative.    Allergic/Immunologic: Negative for environmental allergies and food allergies.   Neurological: Negative for light-headedness and headaches.   Hematological: Does not bruise/bleed easily.   Psychiatric/Behavioral: Negative.        ACTIVE PROBLEMS:   Specialty Problems        Gastroenterology Problems    IBS (irritable bowel  syndrome)        Gastroesophageal reflux disease              PAST MEDICAL HISTORY:  Past Medical History:   Diagnosis Date   • Anemia 2017   • Arthritis 2009   • Constipation    • COPD (chronic obstructive pulmonary disease) (HCC)    • Esophagitis, reflux    • Fatty liver 2/10/2020    [facility]   • GERD (gastroesophageal reflux disease) 2009   • Hypercholesterolemia    • Hypertension    • Hypothyroidism 6/2021    Secondary   • Irritable bowel    • Kidney stone    • Lumbago    • Memory loss    • Restless legs    • Solitary thyroid nodule 2/7/2022   • Thyroid nodule 08/2021   • Vitamin D deficiency 12/2020       SURGICAL HISTORY:  Past Surgical History:   Procedure Laterality Date   • ANKLE SURGERY     • CHOLECYSTECTOMY     • COLONOSCOPY  2011   • COLONOSCOPY N/A 8/31/2020    Procedure: COLONOSCOPY CPT CODE: 00217;  Surgeon: Bhupinder Orta MD;  Location: [facility];  Service: Gastroenterology;  Laterality: N/A;   • ENDOSCOPY N/A 8/31/2020    Procedure: ESOPHAGOGASTRODUODENOSCOPY WITH BIOPSY CPT CODE: 27485;  Surgeon: Bhupinder Orta MD;  Location: [facility];  Service: Gastroenterology;  Laterality: N/A;   • HYSTERECTOMY     • TUBAL ABDOMINAL LIGATION     • UPPER GASTROINTESTINAL ENDOSCOPY  02/27/2020    [facility]       FAMILY HISTORY:  Family History   Problem Relation Age of Onset   • Diabetes Mother    • Hypertension Mother    • Kidney disease Mother    • Thyroid disease Mother    • Arthritis Father    • Diabetes Father    • Hypertension Father    • Birth defects Sister    • Kidney disease Sister    • Diabetes Sister    • Asthma Brother    • Diabetes Brother    • Hypertension Brother    • Thyroid disease Brother    • COPD Maternal Uncle    • Cancer Maternal Uncle         Bladder   • Colon cancer Maternal Uncle    • Arthritis Maternal Grandmother    • Cancer Maternal Grandmother         Kidney and liver   • Diabetes Maternal Grandmother    • Hypertension Maternal Grandmother   "  • Liver disease Maternal Grandmother    • Cirrhosis Maternal Grandmother    • Liver cancer Maternal Grandmother    • Breast cancer Maternal Aunt    • Cancer Maternal Uncle         Lung and bone   • Cancer Paternal Grandmother         Colon   • Colon cancer Paternal Grandmother    • Cirrhosis Maternal Grandfather        SOCIAL HISTORY:  Social History     Tobacco Use   • Smoking status: Current Every Day Smoker     Packs/day: 1.00     Years: 15.00     Pack years: 15.00     Types: Cigarettes   • Smokeless tobacco: Never Used   Substance Use Topics   • Alcohol use: No       CURRENT MEDICATION:    Current Outpatient Medications:   •  albuterol sulfate  (90 Base) MCG/ACT inhaler, Inhale 2 puffs Every 4 (Four) Hours As Needed for Shortness of Air., Disp: 18 g, Rfl: 2  •  amitriptyline (ELAVIL) 25 MG tablet, Take 25 mg by mouth Every Night., Disp: , Rfl:   •  estradiol (ESTRACE VAGINAL) 0.1 MG/GM vaginal cream, Daily x7 days then twice weekly, Disp: 1 each, Rfl: 12  •  Fluticasone Furoate-Vilanterol (Breo Ellipta) 100-25 MCG/INH inhaler, Inhale 1 puff Daily., Disp: 1 inhaler, Rfl: 5  •  linaclotide (LINZESS) 290 MCG capsule capsule, Take 1 capsule by mouth Every Morning Before Breakfast., Disp: 48 capsule, Rfl: 0  •  metoprolol succinate XL (TOPROL-XL) 25 MG 24 hr tablet, Take 1 tablet by mouth once daily, Disp: 30 tablet, Rfl: 0  •  tamsulosin (Flomax) 0.4 MG capsule 24 hr capsule, Take 1 capsule by mouth Every Night., Disp: 30 capsule, Rfl: 5  •  promethazine (PHENERGAN) 25 MG tablet, Take 1 tablet by mouth Every 6 (Six) Hours As Needed for Nausea or Vomiting., Disp: 30 tablet, Rfl: 0  •  Prucalopride Succinate (Motegrity) 2 MG tablet, Take 1 tablet by mouth Daily., Disp: 90 tablet, Rfl: 3    ALLERGIES:  Azithromycin    VISIT VITALS:  /83   Pulse 83   Ht 152.4 cm (60\")   Wt 62.6 kg (138 lb)   LMP 08/25/2020   SpO2 98%   BMI 26.95 kg/m²   Physical Exam  Constitutional:       General: She is not in " acute distress.     Appearance: Normal appearance. She is well-developed.   HENT:      Head: Normocephalic and atraumatic.   Eyes:      Pupils: Pupils are equal, round, and reactive to light.   Cardiovascular:      Rate and Rhythm: Normal rate and regular rhythm.      Heart sounds: Normal heart sounds.   Pulmonary:      Effort: Pulmonary effort is normal. No respiratory distress.      Breath sounds: Normal breath sounds. No wheezing, rhonchi or rales.   Abdominal:      General: Abdomen is flat. Bowel sounds are normal. There is no distension.      Palpations: Abdomen is soft. There is no mass.      Tenderness: There is no abdominal tenderness. There is no guarding or rebound.      Hernia: No hernia is present.   Musculoskeletal:         General: No swelling. Normal range of motion.      Cervical back: Normal range of motion and neck supple.      Right lower leg: No edema.      Left lower leg: No edema.   Skin:     General: Skin is warm and dry.   Neurological:      Mental Status: She is alert and oriented to person, place, and time.   Psychiatric:         Attention and Perception: Attention normal.         Mood and Affect: Mood normal.         Speech: Speech normal.         Behavior: Behavior normal. Behavior is cooperative.         Thought Content: Thought content normal.           Assessment/Plan   Due to the patient's symptoms-we will go ahead and get her started on Motegrity 2 mg once daily in combination with Linzess 290 mcg to see if that will help with motility and function of the colon.  I will also send in patient some Phenergan 25 mg as needed for nausea/vomiting, medication does cause her drowsiness.   Diagnosis Plan   1. Intractable vomiting with nausea, unspecified vomiting type  promethazine (PHENERGAN) 25 MG tablet   2. Chronic idiopathic constipation  Prucalopride Succinate (Motegrity) 2 MG tablet       Return in about 3 months (around 6/21/2022).                   This document has been  electronically signed by Grisel See PA-C  March 25, 2022 08:25 EDT    Part of this note may be an electronic transcription/translation of spoken language to printed text using the Dragon Dictation System.

## 2022-03-23 ENCOUNTER — TELEPHONE (OUTPATIENT)
Dept: GASTROENTEROLOGY | Facility: CLINIC | Age: 32
End: 2022-03-23

## 2022-06-28 ENCOUNTER — OFFICE VISIT (OUTPATIENT)
Dept: GASTROENTEROLOGY | Facility: CLINIC | Age: 32
End: 2022-06-28

## 2022-06-28 VITALS — HEIGHT: 60 IN | WEIGHT: 139 LBS | BODY MASS INDEX: 27.29 KG/M2

## 2022-06-28 DIAGNOSIS — K52.9 ENTERITIS: ICD-10-CM

## 2022-06-28 DIAGNOSIS — R10.31 RIGHT LOWER QUADRANT ABDOMINAL PAIN: Primary | ICD-10-CM

## 2022-06-28 PROCEDURE — 99213 OFFICE O/P EST LOW 20 MIN: CPT | Performed by: PHYSICIAN ASSISTANT

## 2022-06-28 RX ORDER — AMITRIPTYLINE HYDROCHLORIDE 50 MG/1
50 TABLET, FILM COATED ORAL
COMMUNITY
Start: 2022-04-21

## 2022-06-28 RX ORDER — CIPROFLOXACIN 500 MG/1
TABLET, FILM COATED ORAL
COMMUNITY
Start: 2022-06-24

## 2022-06-28 NOTE — PROGRESS NOTES
Chief Complaint   Patient presents with   • Nausea   • Vomiting       Elisabeth Hall is a 32 y.o. female who presents to the office today for evaluation of Nausea and Vomiting  .    HPI  Patient presents to the clinic today for evaluation of nausea/vomiting and abdominal pain.  She was recently seen at [facility] in which they diagnosed her with enteritis involving small bowel.  She was placed on a regimen of Cipro however she is 4 days in to her 10-day treatment and has not noticed any improvement in the abdominal pain and nausea she is experiencing.  Patient is extremely tender to the whole abdomen however the pain is mainly localized to the right lower side.  Patient states that the pain is a constant ache followed by random sharp shooting pains.  The pain does seem to be better if she does not eat-when she does eat the pain does become more sharp.  The hospital also told her to avoid seeds nuts and berries however she was not told she had diverticulitis.  She has been doing a very bland and boring diet currently.  She is not having any diarrhea at this time- she generally suffers from chronic constipation which has been pretty well controlled up until this point.    Review of Systems   Constitutional: Positive for appetite change. Negative for chills, fatigue, fever and unexpected weight change.   HENT: Negative for trouble swallowing.    Eyes: Negative.    Respiratory: Negative for cough, choking, chest tightness and shortness of breath.    Cardiovascular: Negative for chest pain.   Gastrointestinal: Positive for abdominal distention, abdominal pain, constipation and nausea. Negative for anal bleeding, blood in stool, diarrhea and vomiting.   Endocrine: Negative.    Genitourinary: Negative for difficulty urinating.   Musculoskeletal: Positive for back pain. Negative for neck pain.   Skin: Negative.    Allergic/Immunologic: Negative for environmental allergies and food allergies.   Neurological:  Negative for light-headedness and headaches.   Hematological: Does not bruise/bleed easily.   Psychiatric/Behavioral: Negative.        ACTIVE PROBLEMS:   Specialty Problems        Gastroenterology Problems    IBS (irritable bowel syndrome)        Gastroesophageal reflux disease              PAST MEDICAL HISTORY:  Past Medical History:   Diagnosis Date   • Anemia 2017   • Arthritis 2009   • Constipation    • COPD (chronic obstructive pulmonary disease) (HCC)    • Esophagitis, reflux    • Fatty liver 2/10/2020    [facility]   • GERD (gastroesophageal reflux disease) 2009   • Hypercholesterolemia    • Hypertension    • Hypothyroidism 6/2021    Secondary   • Irritable bowel    • Kidney stone    • Lumbago    • Memory loss    • Restless legs    • Solitary thyroid nodule 2/7/2022   • Thyroid nodule 08/2021   • Vitamin D deficiency 12/2020       SURGICAL HISTORY:  Past Surgical History:   Procedure Laterality Date   • ANKLE SURGERY     • CHOLECYSTECTOMY     • COLONOSCOPY  2011   • COLONOSCOPY N/A 8/31/2020    Procedure: COLONOSCOPY CPT CODE: 14643;  Surgeon: Bhupinder Orta MD;  Location: [facility];  Service: Gastroenterology;  Laterality: N/A;   • ENDOSCOPY N/A 8/31/2020    Procedure: ESOPHAGOGASTRODUODENOSCOPY WITH BIOPSY CPT CODE: 01684;  Surgeon: Bhupinder Orta MD;  Location: [facility];  Service: Gastroenterology;  Laterality: N/A;   • HYSTERECTOMY     • TUBAL ABDOMINAL LIGATION     • UPPER GASTROINTESTINAL ENDOSCOPY  02/27/2020    [facility]       FAMILY HISTORY:  Family History   Problem Relation Age of Onset   • Diabetes Mother    • Hypertension Mother    • Kidney disease Mother    • Thyroid disease Mother    • Arthritis Father    • Diabetes Father    • Hypertension Father    • Birth defects Sister    • Kidney disease Sister    • Diabetes Sister    • Asthma Brother    • Diabetes Brother    • Hypertension Brother    • Thyroid disease Brother    • COPD Maternal Uncle    • Cancer  Maternal Uncle         Bladder   • Colon cancer Maternal Uncle    • Arthritis Maternal Grandmother    • Cancer Maternal Grandmother         Kidney and liver   • Diabetes Maternal Grandmother    • Hypertension Maternal Grandmother    • Liver disease Maternal Grandmother    • Cirrhosis Maternal Grandmother    • Liver cancer Maternal Grandmother    • Breast cancer Maternal Aunt    • Cancer Maternal Uncle         Lung and bone   • Cancer Paternal Grandmother         Colon   • Colon cancer Paternal Grandmother    • Cirrhosis Maternal Grandfather        SOCIAL HISTORY:  Social History     Tobacco Use   • Smoking status: Current Every Day Smoker     Packs/day: 1.00     Years: 15.00     Pack years: 15.00     Types: Cigarettes   • Smokeless tobacco: Never Used   Substance Use Topics   • Alcohol use: No       CURRENT MEDICATION:    Current Outpatient Medications:   •  albuterol sulfate  (90 Base) MCG/ACT inhaler, Inhale 2 puffs Every 4 (Four) Hours As Needed for Shortness of Air., Disp: 18 g, Rfl: 2  •  amitriptyline (ELAVIL) 25 MG tablet, Take 25 mg by mouth Every Night., Disp: , Rfl:   •  amitriptyline (ELAVIL) 50 MG tablet, Take 50 mg by mouth every night at bedtime., Disp: , Rfl:   •  ciprofloxacin (CIPRO) 500 MG tablet, , Disp: , Rfl:   •  estradiol (ESTRACE VAGINAL) 0.1 MG/GM vaginal cream, Daily x7 days then twice weekly, Disp: 1 each, Rfl: 12  •  Fluticasone Furoate-Vilanterol (Breo Ellipta) 100-25 MCG/INH inhaler, Inhale 1 puff Daily., Disp: 1 inhaler, Rfl: 5  •  linaclotide (LINZESS) 290 MCG capsule capsule, Take 1 capsule by mouth Every Morning Before Breakfast., Disp: 48 capsule, Rfl: 0  •  metoprolol succinate XL (TOPROL-XL) 25 MG 24 hr tablet, Take 1 tablet by mouth once daily, Disp: 30 tablet, Rfl: 0  •  promethazine (PHENERGAN) 25 MG tablet, Take 1 tablet by mouth Every 6 (Six) Hours As Needed for Nausea or Vomiting., Disp: 30 tablet, Rfl: 0  •  Prucalopride Succinate (Motegrity) 2 MG tablet, Take 1  "tablet by mouth Daily., Disp: 90 tablet, Rfl: 3  •  tamsulosin (Flomax) 0.4 MG capsule 24 hr capsule, Take 1 capsule by mouth Every Night., Disp: 30 capsule, Rfl: 5  •  riFAXIMin (XIFAXAN) 550 MG tablet, Take 1 tablet by mouth 3 (Three) Times a Day., Disp: 42 tablet, Rfl: 0    ALLERGIES:  Azithromycin    VISIT VITALS:  Ht 152.4 cm (60\")   Wt 63 kg (139 lb)   LMP 08/25/2020   BMI 27.15 kg/m²   Physical Exam  Constitutional:       General: She is not in acute distress.     Appearance: Normal appearance. She is well-developed.   HENT:      Head: Normocephalic and atraumatic.   Eyes:      Pupils: Pupils are equal, round, and reactive to light.   Cardiovascular:      Rate and Rhythm: Normal rate and regular rhythm.      Heart sounds: Normal heart sounds.   Pulmonary:      Effort: Pulmonary effort is normal. No respiratory distress.      Breath sounds: Normal breath sounds. No wheezing, rhonchi or rales.   Abdominal:      General: Abdomen is flat. Bowel sounds are normal. There is no distension.      Palpations: Abdomen is soft. There is no mass.      Tenderness: There is abdominal tenderness. There is no guarding or rebound.      Hernia: No hernia is present.   Musculoskeletal:         General: No swelling. Normal range of motion.      Cervical back: Normal range of motion and neck supple.      Right lower leg: No edema.      Left lower leg: No edema.   Skin:     General: Skin is warm and dry.   Neurological:      Mental Status: She is alert and oriented to person, place, and time.   Psychiatric:         Attention and Perception: Attention normal.         Mood and Affect: Mood normal.         Speech: Speech normal.         Behavior: Behavior normal. Behavior is cooperative.         Thought Content: Thought content normal.         Assessment    Due to the patient's symptoms-I will go ahead and send her in Xifaxan 550 mg 3 times daily for 14 days-she was provided some samples in clinic as well.  She was told to " discontinue the Cipro as it does not seem to be improving symptoms.  She was told by the end of the week if symptoms do not start improving to contact the office and we will look at additional treatment options.   Diagnosis Plan   1. Right lower quadrant abdominal pain  riFAXIMin (XIFAXAN) 550 MG tablet   2. Enteritis  riFAXIMin (XIFAXAN) 550 MG tablet       Return if symptoms worsen or fail to improve.                   This document has been electronically signed by Grisel Zuleta PA-C  June 30, 2022 10:28 EDT    Part of this note may be an electronic transcription/translation of spoken language to printed text using the Dragon Dictation System.

## 2022-08-31 ENCOUNTER — HOSPITAL ENCOUNTER (OUTPATIENT)
Dept: GENERAL RADIOLOGY | Facility: HOSPITAL | Age: 32
Discharge: HOME | End: 2022-08-31
Admitting: PHYSICIAN ASSISTANT

## 2022-08-31 ENCOUNTER — OFFICE VISIT (OUTPATIENT)
Dept: GASTROENTEROLOGY | Facility: CLINIC | Age: 32
End: 2022-08-31

## 2022-08-31 VITALS — HEIGHT: 60 IN | OXYGEN SATURATION: 98 % | BODY MASS INDEX: 27.05 KG/M2 | WEIGHT: 137.8 LBS | HEART RATE: 106 BPM

## 2022-08-31 DIAGNOSIS — R19.7 DIARRHEA, UNSPECIFIED TYPE: ICD-10-CM

## 2022-08-31 DIAGNOSIS — R19.7 DIARRHEA, UNSPECIFIED TYPE: Primary | ICD-10-CM

## 2022-08-31 PROCEDURE — 74018 RADEX ABDOMEN 1 VIEW: CPT | Performed by: RADIOLOGY

## 2022-08-31 PROCEDURE — 99213 OFFICE O/P EST LOW 20 MIN: CPT | Performed by: PHYSICIAN ASSISTANT

## 2022-08-31 PROCEDURE — 74018 RADEX ABDOMEN 1 VIEW: CPT

## 2022-11-30 ENCOUNTER — HOSPITAL ENCOUNTER (OUTPATIENT)
Dept: GENERAL RADIOLOGY | Facility: HOSPITAL | Age: 32
Discharge: HOME | End: 2022-11-30
Admitting: NURSE PRACTITIONER

## 2022-11-30 ENCOUNTER — OFFICE VISIT (OUTPATIENT)
Dept: UROLOGY | Facility: CLINIC | Age: 32
End: 2022-11-30

## 2022-11-30 ENCOUNTER — HOSPITAL ENCOUNTER (OUTPATIENT)
Facility: HOSPITAL | Age: 32
Setting detail: HOSPITAL OUTPATIENT SURGERY
End: 2022-11-30
Attending: UROLOGY | Admitting: UROLOGY
Payer: COMMERCIAL

## 2022-11-30 VITALS — HEIGHT: 60 IN | WEIGHT: 137 LBS | BODY MASS INDEX: 26.9 KG/M2

## 2022-11-30 DIAGNOSIS — N30.00 ACUTE CYSTITIS WITHOUT HEMATURIA: ICD-10-CM

## 2022-11-30 DIAGNOSIS — N20.0 KIDNEY STONES: ICD-10-CM

## 2022-11-30 DIAGNOSIS — R10.9 BILATERAL FLANK PAIN: ICD-10-CM

## 2022-11-30 DIAGNOSIS — N20.1 LEFT URETERAL STONE: Primary | ICD-10-CM

## 2022-11-30 DIAGNOSIS — N20.0 BILATERAL NEPHROLITHIASIS: ICD-10-CM

## 2022-11-30 LAB
BILIRUB BLD-MCNC: NEGATIVE MG/DL
CLARITY, POC: CLEAR
COLOR UR: YELLOW
EXPIRATION DATE: NORMAL
GLUCOSE UR STRIP-MCNC: NEGATIVE MG/DL
KETONES UR QL: NEGATIVE
LEUKOCYTE EST, POC: NEGATIVE
Lab: NORMAL
NITRITE UR-MCNC: NEGATIVE MG/ML
PH UR: 6 [PH] (ref 5–8)
PROT UR STRIP-MCNC: NEGATIVE MG/DL
RBC # UR STRIP: NEGATIVE /UL
SP GR UR: 1.01 (ref 1–1.03)
UROBILINOGEN UR QL: NORMAL

## 2022-11-30 PROCEDURE — 96372 THER/PROPH/DIAG INJ SC/IM: CPT | Performed by: NURSE PRACTITIONER

## 2022-11-30 PROCEDURE — 99214 OFFICE O/P EST MOD 30 MIN: CPT | Performed by: NURSE PRACTITIONER

## 2022-11-30 PROCEDURE — 74018 RADEX ABDOMEN 1 VIEW: CPT | Performed by: RADIOLOGY

## 2022-11-30 PROCEDURE — 87077 CULTURE AEROBIC IDENTIFY: CPT | Performed by: NURSE PRACTITIONER

## 2022-11-30 PROCEDURE — 87186 SC STD MICRODIL/AGAR DIL: CPT | Performed by: NURSE PRACTITIONER

## 2022-11-30 PROCEDURE — 51798 US URINE CAPACITY MEASURE: CPT | Performed by: NURSE PRACTITIONER

## 2022-11-30 PROCEDURE — 81003 URINALYSIS AUTO W/O SCOPE: CPT | Performed by: NURSE PRACTITIONER

## 2022-11-30 PROCEDURE — 74018 RADEX ABDOMEN 1 VIEW: CPT

## 2022-11-30 PROCEDURE — 87086 URINE CULTURE/COLONY COUNT: CPT | Performed by: NURSE PRACTITIONER

## 2022-11-30 RX ORDER — ONDANSETRON 4 MG/1
4 TABLET, FILM COATED ORAL EVERY 12 HOURS PRN
Qty: 20 TABLET | Refills: 0 | Status: SHIPPED | OUTPATIENT
Start: 2022-11-30

## 2022-11-30 RX ORDER — KETOROLAC TROMETHAMINE 30 MG/ML
60 INJECTION, SOLUTION INTRAMUSCULAR; INTRAVENOUS ONCE
Status: COMPLETED | OUTPATIENT
Start: 2022-11-30 | End: 2022-11-30

## 2022-11-30 RX ORDER — KETOROLAC TROMETHAMINE 10 MG/1
10 TABLET, FILM COATED ORAL EVERY 6 HOURS PRN
Qty: 16 TABLET | Refills: 0 | Status: SHIPPED | OUTPATIENT
Start: 2022-11-30

## 2022-11-30 RX ORDER — GENTAMICIN SULFATE 80 MG/100ML
80 INJECTION, SOLUTION INTRAVENOUS ONCE
Status: CANCELLED | OUTPATIENT
Start: 2022-11-30 | End: 2022-11-30

## 2022-11-30 RX ORDER — PHENAZOPYRIDINE HYDROCHLORIDE 200 MG/1
200 TABLET, FILM COATED ORAL 3 TIMES DAILY PRN
Qty: 20 TABLET | Refills: 0 | Status: SHIPPED | OUTPATIENT
Start: 2022-11-30

## 2022-11-30 RX ORDER — TAMSULOSIN HYDROCHLORIDE 0.4 MG/1
1 CAPSULE ORAL NIGHTLY
Qty: 30 CAPSULE | Refills: 5 | Status: SHIPPED | OUTPATIENT
Start: 2022-11-30

## 2022-11-30 RX ADMIN — KETOROLAC TROMETHAMINE 60 MG: 30 INJECTION, SOLUTION INTRAMUSCULAR; INTRAVENOUS at 12:17

## 2022-11-30 NOTE — PROGRESS NOTES
Chief Complaint  LEFT URETERAL STONE/BILATERAL NEPHROLITHIASIS/FLANK PAIN/DY (FOLLOW UP LEFT UPJ STONE/FLANK PAIN/DYSURIA/)    Subjective          Narendra Chavez presents to [facility] for LEFT UPJ STONE 5MM.     History of Present Illness    The patient has consented to the use of WEN.    MS NARENDRA CHAVEZ is a pleasant 32-year-old female with a significant history of recurrent kidney stones, who returns to the clinic today for evaluation. Patient is here for follow-up on kidney stones. She was seen in the clinic last 01/28/2022 by Dr. Garza. AT THAT TIME,  she was initially evaluated as a new patient consult for her kidney stones.     ALTHOUGH She had reported passing numerous stones during that evaluation in 01/2022, during that time, she had nausea, no vomiting and denies any other kidney stone related symptoms. Patient had reported a significant family history of kidney stones in her grandmother. She also had a recent CT scan done in [place] that had showed a 1.4cm right adrenal mass and bilateral non-obstructing kidney stones. Patient also had a fluid-filled bowel with unknown contents and was post-hysterectomy. She was referred to gastroenterology for follow-up on her abdominal issues and was recommended to get an MRI,  In reevaluation of her adrenal adenoma seen on her last CT scan.  The patient had an MRI completed in 01/2022 with findings also consistent with right adrenal adenoma that measured approximately 18mm in diameter.  On that MRI, there were no renal stones demonstrated.  She has done relatively well until recently.      Nevertheless, patient had a recent CT scan done in [place] [facility] on 11/02/2022 per her GI recommendation for left lower quadrant abdominal pain. HER CT scan was reviewed today and the imaging had showed a 5mm obstructing stone in the left mid ureter. THE  CT scan also showed a 1cm simple cyst in the right kidney. Her urinary  "bladder was otherwise unremarkable. CT also characterized her 1.8cm right adrenal adenoma that is unchanged from prior origin.     Patient presents to the clinic today 11/30/for follow-up left lower quadrant abdominal discomfort due to her persistent and intermittent lower back pain with some urinary discomfort.  Her urine dipstick is unremarkable as is completely negative for any leukocyte esterase, negative nitrites, it is negative for gross/microscopic hematuria.  Her PVR is 0 mL.      She had a repeat KUB this morning which was unremarkable but still shows bilateral renal stone unchanged from the prior imaging. FINDINGS: One view of the abdomen shows Calcifications are seen over both kidneys,  Surgical clips in the right upper quadrant    GASTROINTESTINAL TRACT: Moderate stool burden .This is characterized by extreme amounts of constipation.  We spoke about the impact of this on bladder function.  We spoke about  its relationship to recurrent urinary tract infections, vaginal pain, pelvic/bladder pressure, back pain, flank pain/LLQ ABDOMINAL PAIN she is experiencing.     The patient reports having left lower quadrant abdominal pain and bilateral hip pain, more on the left than the right. Pain is rated as a 4 out of 10. She also had nausea, low grade fever, occasional burning sensation on urination and urinary frequency. She denies any vomiting, chills, or hematuria. Patient denies passing any stones when she had the CT scan done in 11/02/2022. She continues to take Flomax daily and Phenergan as needed for nausea. Patient states that she has tried taking Pyridium in the past and tolerated it.     The patient reports having a colonoscopy done last 11/17/2022.     The rest of her PMHx as noted in chart    Objective   Vital Signs:   Ht 152.4 cm (60\")   Wt 62.1 kg (137 lb)   BMI 26.76 kg/m²       ROS:   Review of Systems   Constitutional: Positive for activity change, appetite change and fatigue. Negative for " chills, diaphoresis, fever, unexpected weight gain and unexpected weight loss.   HENT: Negative for congestion, ear discharge, ear pain, nosebleeds, rhinorrhea, sinus pressure and sore throat.    Eyes: Negative for blurred vision, double vision, photophobia, pain, redness and visual disturbance.   Respiratory: Negative for apnea, cough, chest tightness, shortness of breath, wheezing and stridor.    Cardiovascular: Negative for chest pain and palpitations.   Gastrointestinal: Positive for abdominal pain and nausea. Negative for abdominal distention, constipation, diarrhea and vomiting.   Endocrine: Negative for polydipsia, polyphagia and polyuria.   Genitourinary: Positive for decreased urine volume, flank pain, frequency, pelvic pain and pelvic pressure. Negative for difficulty urinating, dyspareunia, dysuria, genital sores, hematuria, urgency, urinary incontinence and vaginal discharge.   Musculoskeletal: Positive for back pain. Negative for arthralgias and joint swelling.   Skin: Positive for pallor. Negative for rash and wound.   Neurological: Negative for dizziness, tremors, syncope, weakness, light-headedness, headache, memory problem and confusion.   Psychiatric/Behavioral: Positive for sleep disturbance and stress. Negative for behavioral problems and decreased concentration.        Physical Exam  Constitutional:       General: She is in acute distress.      Appearance: She is well-developed. She is ill-appearing.   HENT:      Head: Normocephalic and atraumatic.   Eyes:      Pupils: Pupils are equal, round, and reactive to light.   Neck:      Thyroid: No thyromegaly.      Trachea: No tracheal deviation.   Cardiovascular:      Rate and Rhythm: Normal rate and regular rhythm.      Heart sounds: No murmur heard.  Pulmonary:      Effort: Pulmonary effort is normal. No respiratory distress.      Breath sounds: Normal breath sounds. No stridor. No wheezing.   Abdominal:      General: Bowel sounds are normal.       Palpations: Abdomen is soft.      Tenderness: There is abdominal tenderness. There is guarding (  LLQ ABDOMEN).   Genitourinary:     Labia:         Right: No tenderness.         Left: No tenderness.       Vagina: Normal. No vaginal discharge.      Comments: URINE FREQUENCY, BURNING WITH URINATION    Musculoskeletal:         General: Tenderness (  L>RIGHT FLANK/LOWER BACK PAIN) present. No deformity. Normal range of motion.      Cervical back: Normal range of motion.   Skin:     General: Skin is warm and dry.      Capillary Refill: Capillary refill takes less than 2 seconds.      Coloration: Skin is pale.      Findings: No erythema or rash.   Neurological:      Mental Status: She is alert and oriented to person, place, and time.      Cranial Nerves: No cranial nerve deficit.      Sensory: No sensory deficit.      Coordination: Coordination normal.   Psychiatric:         Behavior: Behavior normal.         Thought Content: Thought content normal.         Judgment: Judgment normal.        Result Review :     UA    Urinalysis 11/30/22   Ketones, UA Negative   Leukocytes, UA Negative                    Assessment and Plan    Problem List Items Addressed This Visit        Gastrointestinal Abdominal     Bilateral flank pain    Overview     Added automatically from request for surgery [id number]         Relevant Medications    tamsulosin (Flomax) 0.4 MG capsule 24 hr capsule    ketorolac (TORADOL) 10 MG tablet    ondansetron (Zofran) 4 MG tablet    phenazopyridine (Pyridium) 200 MG tablet       Genitourinary and Reproductive     Left ureteral stone - Primary    Overview     Added automatically from request for surgery [id number]         Relevant Medications    tamsulosin (Flomax) 0.4 MG capsule 24 hr capsule    ketorolac (TORADOL) 10 MG tablet    ondansetron (Zofran) 4 MG tablet    phenazopyridine (Pyridium) 200 MG tablet    Acute cystitis without hematuria    Overview     Added automatically from request for surgery [id number]          Relevant Medications    tamsulosin (Flomax) 0.4 MG capsule 24 hr capsule    ketorolac (TORADOL) 10 MG tablet    ondansetron (Zofran) 4 MG tablet    phenazopyridine (Pyridium) 200 MG tablet    Other Relevant Orders    Urine Culture - Urine, Urine, Clean Catch   Other Visit Diagnoses     Bilateral nephrolithiasis        Relevant Medications    tamsulosin (Flomax) 0.4 MG capsule 24 hr capsule    ketorolac (TORADOL) 10 MG tablet    ondansetron (Zofran) 4 MG tablet    phenazopyridine (Pyridium) 200 MG tablet    Kidney stones        Relevant Medications    tamsulosin (Flomax) 0.4 MG capsule 24 hr capsule    ketorolac (TORADOL) 10 MG tablet    ondansetron (Zofran) 4 MG tablet    phenazopyridine (Pyridium) 200 MG tablet                                                                               ASSESSMENT              LEFT 5MM UVJSTONE/BILATERA STONES/FLANK PAIN/ACUTE CYSTITIS      Mrs. NARENDRA CHAVEZ is a 32-year-old very pleasant patient, with a significant history of recurrent kidney stones, who presents to clinic today for evaluation, secondary to very sharp/colicky 4/10, aching and very consistent LLQ / flank pain radiating to HER  LLQ of abdomen and suprapubic region.     ALTHOUGH  patient had a recent CT scan done in [place] [facility] on 11/02/2022 per her GI recommendation THAT showed a 5mm obstructing stone in the left mid ureter.  Her repeat KUB today is unremarkable but showing bilateral nonobstructing stones, and a questionable right proximal ureteral stone.  Patient is however symptomatic with left lower quadrant abdominal discomfort due to her persistent and intermittent lower back pain with some urinary discomfort.  Her urine dipstick is unremarkable as is completely negative for any leukocyte esterase, negative nitrites, it is negative for gross/microscopic hematuria.  Her PVR is 0 mL.      We have discussed the various parameters regarding spontaneous passage including the notion that a tiny 1 to 4  mm stone, has a 90% high likelihood of spontaneous passage versus a larger stone of greater than 5mm  like She has being caught up in the upper areas of the urinary tract. We also discussed the medical management of stone disease and the use of medical expulsive therapy in the form of Flomax. This is used in an off label setting. We discussed the various therapeutic options available including percutaneous Nephrostolithotomy, lithotripsy, ESWL.  We ALSO discussed the indicators for intervention including  absolute indicators such as sepsis and uncontrollable severe pain as well as  the relative indicators of moderate pain that is well-controlled with various analgesia.  Patient is NOW desirous of surgical intervention.            PLAN  I Discussed this case with Dr AHUMADA and  HE is agreeable WITH this plan of care.      Patient has been scheduled for LEFT  EXTRACORPOREAL SHOCKWAVE  LITHOTRIPSY - ESWL on Friday, 12/09/22 with Dr. Ahumada.    We discussed he repeat an x-ray prior to procedure Friday 12/09 if her symptoms worsen she will reevaluate position stone.    TORODOL 60 MG IM X 1 DOSE GIVEN IN CLINIC TODAY.     SHe has been given nonnarcotic pain medication and advised to continue with Toradol/Tylenol/or Motrin for breakthrough pain as needed.  Also nausea medicine, Flomax for medical expulsive therapy.     We discussed treatment options for her flank pain with patient encouraged to continue conservative therapy alternating NSAID/Tylenol as tolerated, Also including hot baths, showers, warm compresses to lower back as tolerated. Also encouraged walking, physical therapy, light exercises as tolerated    Rest is the most important treatment in the case of flank pain. Rest and physical therapy are enough to improve minor pain. Discussed to monitor her daily routine with prevention of flank pain by: At least Drinking 8 glasses of water per day, Limiting your alcohol consumption.  Having a healthy diet containing  fruits, vegetables, and a lot of fluids, Practicing safe sex.  Also maintaining proper hygiene of your body as well as the environment.    We will follow-up in clinic post procedure,    SHE  has been advised to return sooner if need be.    Patient has caregiver are agreeable to plan of care    Patient reports that she is not currently experiencing any symptoms of urinary incontinence.    BMI is >= 25 and <30. (Overweight) The following options were offered after discussion;: weight loss educational material (shared in after visit summary), exercise counseling/recommendations and nutrition counseling/recommendations    RADIOLOGY (CT AND/OR KUB):    CT Abdomen and Pelvis: No results found for this or any previous visit.     CT Stone Protocol: No results found for this or any previous visit.     KUB: Results for orders placed during the hospital encounter of 08/31/22    XR Abdomen KUB    Narrative  EXAM:  XR Abdomen, 1 View    EXAM DATE:  8/31/2022 11:18 AM    CLINICAL HISTORY:  examine stool pattern; R19.7-Diarrhea, unspecified    TECHNIQUE:  Frontal supine view of the abdomen/pelvis.    COMPARISON:  01/20/2022    FINDINGS:  GASTROINTESTINAL TRACT:  Minimal scattered stool throughout the colon.  No dilation.  ORGANS:  Scattered bilateral renal calcifications noted.  Cholecystectomy clips.  BONES/JOINTS:  Unremarkable.  VASCULATURE:  Vascular phleboliths in the pelvis.    Impression  1.  Scattered bilateral renal calcifications noted.  2.  Minimal scattered stool throughout the colon.    This report was finalized on 8/31/2022 11:24 AM by Dr. Kevin Edwards MD.       LABS (3 MONTHS):    Office Visit on 11/30/2022   Component Date Value Ref Range Status   • Color 11/30/2022 Yellow  Yellow, Straw, Dark Yellow, [place] Final   • Clarity, UA 11/30/2022 Clear  Clear Final   • Specific Gravity  11/30/2022 1.015  1.005 - 1.030 Final   • pH, Urine 11/30/2022 6.0  5.0 - 8.0 Final   • Leukocytes 11/30/2022 Negative  Negative Final    • Nitrite, UA 11/30/2022 Negative  Negative Final   • Protein, POC 11/30/2022 Negative  Negative mg/dL Final   • Glucose, UA 11/30/2022 Negative  Negative mg/dL Final   • Ketones, UA 11/30/2022 Negative  Negative Final   • Urobilinogen, UA 11/30/2022 Normal  Normal, 0.2 E.U./dL Final   • Bilirubin 11/30/2022 Negative  Negative Final   • Blood, UA 11/30/2022 Negative  Negative Final   • Lot Number 11/30/2022 [id number]   Final   • Expiration Date 11/30/2022 2/8/24   Final          Smoking Cessation Counseling:  Current every day smoker. less than 3 minutes spent counseling. Has reduced tobacco use.  I advised patient to quit tobacco use and offered support.  I provided patient with tobacco cessation educational material printed in the patient's After Visit Summary.     Follow Up   Return in about 9 days (around 12/9/2022) for Next scheduled follow up, With Dr. Garza [place] FOR LEFT ESWL/5MM UPJ STONE.    Patient was given instructions and counseling regarding her condition or for health maintenance advice. Please see specific information pulled into the AVS if appropriate.          This document has been electronically signed by Griselda Cheng-Akwa, APRN   November 30, 2022 18:26 EST      Dictated Utilizing Dragon Dictation: Part of this note may be an electronic transcription/translation of spoken language to printed text using the Dragon Dictation System.     Transcribed from ambient dictation for Griselda Cheng-Akwa, APRN by Ashanti Woo.  11/30/22   13:10 EST    Patient or patient representative verbalized consent to the visit recording.  I have personally performed the services described in this document as transcribed by the above individual, and it is both accurate and complete.  Griselda Cheng-Akwa, APRN  11/30/2022  18:27 EST

## 2022-12-02 ENCOUNTER — TELEPHONE (OUTPATIENT)
Dept: UROLOGY | Facility: CLINIC | Age: 32
End: 2022-12-02

## 2022-12-02 DIAGNOSIS — A49.8 BACTERIAL INFECTION DUE TO KLEBSIELLA PNEUMONIAE: Primary | ICD-10-CM

## 2022-12-02 LAB — BACTERIA SPEC AEROBE CULT: ABNORMAL

## 2022-12-02 RX ORDER — CEFDINIR 300 MG/1
300 CAPSULE ORAL 2 TIMES DAILY
Qty: 14 CAPSULE | Refills: 0 | Status: SHIPPED | OUTPATIENT
Start: 2022-12-02 | End: 2022-12-09

## 2022-12-02 NOTE — TELEPHONE ENCOUNTER
Called patient to check on her post clinic visit and to discuss urine culture results positive.  Left message on her voicemail.  I have sent cefdinir to her [facility] pharmacy to  and take as scheduled.  She will follow-up in clinic as discussed, she may return sooner if need be.    Urine Culture 25,000 CFU/mL Klebsiella pneumoniae ssp pneumoniae Abnormal           Colonization of the urinary tract without infection is common. Treatment is discouraged unless the patient is symptomatic, pregnant, or undergoing an invasive urologic procedure.        Resulting Agency: [facility]     Susceptibility     Klebsiella pneumoniae ssp pneumoniae     KORY     Ampicillin Resistant     Ampicillin + Sulbactam Susceptible     Cefazolin Susceptible     Cefepime Susceptible     Ceftazidime Susceptible     Ceftriaxone Susceptible     Gentamicin Susceptible     Levofloxacin Susceptible     Nitrofurantoin Susceptible     Piperacillin + Tazobactam Susceptible     Trimethoprim + Sulfamethoxazole Susceptible

## 2022-12-07 ENCOUNTER — APPOINTMENT (OUTPATIENT)
Dept: PREADMISSION TESTING | Facility: HOSPITAL | Age: 32
End: 2022-12-07

## 2023-01-04 ENCOUNTER — APPOINTMENT (OUTPATIENT)
Dept: PREADMISSION TESTING | Facility: HOSPITAL | Age: 33
End: 2023-01-04
Payer: COMMERCIAL

## 2023-01-04 NOTE — DISCHARGE INSTRUCTIONS

## 2024-02-08 ENCOUNTER — OFFICE VISIT (OUTPATIENT)
Dept: UROLOGY | Facility: CLINIC | Age: 34
End: 2024-02-08
Payer: MEDICAID

## 2024-02-08 VITALS
WEIGHT: 169 LBS | BODY MASS INDEX: 33.18 KG/M2 | HEIGHT: 60 IN | DIASTOLIC BLOOD PRESSURE: 84 MMHG | SYSTOLIC BLOOD PRESSURE: 130 MMHG | HEART RATE: 94 BPM

## 2024-02-08 DIAGNOSIS — N30.00 ACUTE CYSTITIS WITHOUT HEMATURIA: Primary | ICD-10-CM

## 2024-02-08 DIAGNOSIS — K58.1 IRRITABLE BOWEL SYNDROME WITH CONSTIPATION: ICD-10-CM

## 2024-02-08 DIAGNOSIS — N20.0 RENAL STONES: ICD-10-CM

## 2024-02-08 DIAGNOSIS — N28.1 ACQUIRED RENAL CYST OF RIGHT KIDNEY: ICD-10-CM

## 2024-02-08 DIAGNOSIS — R10.9 BILATERAL FLANK PAIN: ICD-10-CM

## 2024-02-08 DIAGNOSIS — N34.3 DYSURIA-FREQUENCY SYNDROME: ICD-10-CM

## 2024-02-08 DIAGNOSIS — N20.0 BILATERAL NEPHROLITHIASIS: ICD-10-CM

## 2024-02-08 DIAGNOSIS — N20.1 LEFT URETERAL STONE: ICD-10-CM

## 2024-02-08 DIAGNOSIS — N20.0 KIDNEY STONES: ICD-10-CM

## 2024-02-08 PROCEDURE — 87086 URINE CULTURE/COLONY COUNT: CPT | Performed by: NURSE PRACTITIONER

## 2024-02-08 RX ORDER — PROMETHAZINE HYDROCHLORIDE 25 MG/1
25 TABLET ORAL EVERY 12 HOURS PRN
Qty: 20 TABLET | Refills: 0 | Status: SHIPPED | OUTPATIENT
Start: 2024-02-08

## 2024-02-08 RX ORDER — LIDOCAINE HYDROCHLORIDE 20 MG/ML
20 INJECTION, SOLUTION EPIDURAL; INFILTRATION; INTRACAUDAL; PERINEURAL ONCE
Status: SHIPPED | OUTPATIENT
Start: 2024-02-08

## 2024-02-08 RX ORDER — PHENAZOPYRIDINE HYDROCHLORIDE 200 MG/1
200 TABLET, FILM COATED ORAL 3 TIMES DAILY PRN
Qty: 20 TABLET | Refills: 0 | Status: SHIPPED | OUTPATIENT
Start: 2024-02-08

## 2024-02-08 NOTE — PROGRESS NOTES
Chief Complaint  ACUTE CYSTITIS/RENAL CYST/KIDNEY STONES (YEARLY FOLLOW UP)    Subjective          Elisabeth Hall presents to [facility] for ACUTE CYSTITIS/PELVIC PAIN  History of Present Illness    Mrs. Elisabeth Hall is a pleasant 34-year-old female who returns to clinic today for evaluation. This is a yearly follow-up for kidney stones.  She is also follow-up for acute cystitis without hematuria, chronic bladder pain, pelvic pressure and suprapubic discomfort.  She reports overactive bladder/detrusor instability with bouts of urinary frequency, urgency, and dysuria. Patient when last seen was also noted to have a right renal cyst, simple cyst.   Last evaluated in 11/2022, the patient did have bilateral non-obstructing stones, and a left distal ureteral stone which has since resolved. She has been lost to follow up until recently.     On initial evaluation in clinic today, she reports recurrent UTIs that have been ongoing for the last few months and now becoming very bothersome to her. The patient reports she is post numerous antibiotics per her PCP. We did contact her PCP office where she was evaluated back in 09/2023, 11/2023, and 12/2023 with no documented positive urine culture. She is post recent bout of Levaquin and recently Fosfomycin. In clinic today, 02/08/2024, her urine dip is completely negative for any leukocyte esterase, it is negative for nitrites, it is negative for gross/microscopic hematuria. Her PVR is 0 mL.     OVERALL, the patient reports she has been miserable.  The patient states she is tired. She is urinating hourly. This has been going on for 1 month. She was given Levaquin 750 mg for 7 days and then 1 week later Fosfomycin. She has had no improvement on either medication. It feels like a urinary tract infection. It is constantly burning even before she urinates. When she is not urinating, it is constantly burning. She has right flank pain. It has  been ongoing for 1 month. She thought it was a kidney stone passing through. When she passes them, it will burn like that for a while. She went to the doctor and they did a dip and it was positive. The second time they dipped her urine, it was negative. They cultured it and that is when they gave her Fosfomycin. She is still burning today. She tried Azo to see if it would help, but it made the pain worse. When she drinks sweet tea, it burns her. She has taken Pyridium in the past and she did fine with it. She does not take it daily.    The patient does not have insurance. Last year when she was supposed to have a kidney stone surgery, her insurance would not approve that. She had to pay 5,000 dollars. She lost her job in 05/2023, but her  still had income. She could not get any medical. Her  lost his job in 11/2023, but he is getting unemployment.    Since she lost her insurance, she does not have her Linzess anymore.  She has severe IBS with constipation.  She used to take MiraLAX, but it makes her side and bladder pain worse. She was taking 290 mcg of Linzess. Her last bowel movement was 5 days ago. She has had nausea, but no vomiting. She usually drinks water for nausea. She gets up at least 5 to 6 times a night to urinate. She stops drinking about 8:00 PM.    Active Ambulatory Problems     Diagnosis Date Noted    Essential hypertension 09/28/2016    RLS (restless legs syndrome) 09/28/2016    IBS (irritable bowel syndrome) 09/28/2016    Iron deficiency anemia 01/17/2017    Fibromyalgia 08/16/2018    COPD mixed type 08/16/2018    Smoker 10/28/2019    Gastroesophageal reflux disease 08/17/2020    Menopausal symptoms 12/03/2020    Healthcare maintenance 04/07/2021    Vitamin D deficiency 04/07/2021    Renal stones 01/31/2022    Adrenal adenoma 01/31/2022    Solitary thyroid nodule 02/07/2022    Left ureteral stone 11/30/2022    Bilateral flank pain 11/30/2022    Acute cystitis without hematuria  "11/30/2022    Acquired renal cyst of right kidney 02/08/2024     Resolved Ambulatory Problems     Diagnosis Date Noted    Muscle spasm 09/28/2016    Vertigo, peripheral 09/28/2016    Chronic midline low back pain with sciatica 09/28/2016    Esophagitis, reflux 09/28/2016    Constipation - functional 09/28/2016    Abscess of groin, left 01/17/2017    Fatigue 12/21/2017    Elevated fasting glucose 12/21/2017    Elevated fasting lipid profile 12/21/2017    Menorrhagia with regular cycle 02/05/2018    Sebaceous cyst of ear 02/05/2018    Stomach pain 02/21/2020    Generalized abdominal pain 03/17/2020    Nausea 03/17/2020    Hypercholesterolemia 04/02/2020    Obesity 08/06/2012    Other chronic disease of tonsils and adenoids 04/02/2020    LUQ abdominal pain 08/17/2020    Irritable bowel syndrome with constipation 08/17/2020    Endometriosis 08/26/2020    Pain in joint involving multiple sites 03/24/2021    Overweight (BMI 25.0-29.9) 03/24/2021     Past Medical History:   Diagnosis Date    Anemia 2017    Arthritis 2009    Constipation     COPD (chronic obstructive pulmonary disease)     Fatty liver 02/10/2020    GERD (gastroesophageal reflux disease) 2009    Hypertension     Hypothyroidism 06/2021    Irritable bowel     Kidney stone     Lumbago     Memory loss     Restless legs     Thyroid nodule 08/2021    Urinary tract infection 01/22      Objective   Vital Signs:   /84 (BP Location: Right arm, Patient Position: Sitting)   Pulse 94   Ht 152.4 cm (60\")   Wt 76.7 kg (169 lb)   BMI 33.01 kg/m²       ROS:   Review of Systems   Constitutional:  Positive for activity change, chills, fatigue, fever and unexpected weight gain. Negative for appetite change, diaphoresis and unexpected weight loss.   HENT: Negative.  Negative for congestion, ear discharge, ear pain, nosebleeds, rhinorrhea, sinus pressure and sore throat.    Eyes: Negative.  Negative for blurred vision, double vision, photophobia, pain, redness and " visual disturbance.   Respiratory:  Negative for apnea, cough, chest tightness, shortness of breath, wheezing and stridor.    Cardiovascular:  Negative for chest pain, palpitations and leg swelling.   Gastrointestinal:  Positive for abdominal distention, abdominal pain, constipation and nausea. Negative for diarrhea and vomiting.   Endocrine: Negative for polydipsia, polyphagia and polyuria.   Genitourinary:  Positive for dysuria, flank pain, frequency, pelvic pain, pelvic pressure, urgency and urinary incontinence. Negative for decreased urine volume, difficulty urinating, dyspareunia, genital sores, hematuria and vaginal discharge.   Musculoskeletal:  Positive for back pain. Negative for arthralgias and joint swelling.   Skin:  Positive for dry skin and pallor. Negative for rash and wound.   Neurological:  Negative for dizziness, tremors, syncope, weakness, light-headedness, headache, memory problem and confusion.   Hematological: Negative.    Psychiatric/Behavioral:  Positive for sleep disturbance and stress. Negative for behavioral problems and decreased concentration.         Physical Exam  Constitutional:       General: She is in acute distress.      Appearance: She is well-developed. She is obese.   HENT:      Head: Normocephalic and atraumatic.   Eyes:      Pupils: Pupils are equal, round, and reactive to light.   Neck:      Thyroid: No thyromegaly.      Trachea: No tracheal deviation.   Cardiovascular:      Rate and Rhythm: Normal rate and regular rhythm.      Heart sounds: No murmur heard.  Pulmonary:      Effort: Pulmonary effort is normal. No respiratory distress.      Breath sounds: Normal breath sounds. No stridor. No wheezing.   Abdominal:      General: Bowel sounds are normal. There is distension.      Palpations: Abdomen is soft.      Tenderness: There is abdominal tenderness.   Genitourinary:     Labia:         Right: No tenderness.         Left: No tenderness.       Vagina: Normal. No vaginal  discharge.      Comments: ACUTE CYSTITIS, FREQUENCY, DYSURIA  Musculoskeletal:         General: No deformity. Normal range of motion.      Cervical back: Normal range of motion.   Skin:     General: Skin is warm and dry.      Capillary Refill: Capillary refill takes less than 2 seconds.      Coloration: Skin is pale.      Findings: No erythema or rash.   Neurological:      Mental Status: She is alert and oriented to person, place, and time.      Cranial Nerves: No cranial nerve deficit.      Sensory: No sensory deficit.      Motor: Weakness present.      Coordination: Coordination normal.   Psychiatric:         Behavior: Behavior normal.         Thought Content: Thought content normal.         Judgment: Judgment normal.        Result Review :     UA          2/8/2024    09:48   Urinalysis   Ketones, UA Negative    Leukocytes, UA Negative                      Assessment and Plan    Problem List Items Addressed This Visit          Gastrointestinal Abdominal     IBS (irritable bowel syndrome)    Relevant Medications    linaclotide (LINZESS) 290 MCG capsule capsule    Bilateral flank pain    Overview     Added automatically from request for surgery [id number]         Relevant Medications    promethazine (PHENERGAN) 25 MG tablet       Genitourinary and Reproductive     Renal stones    Relevant Medications    lidocaine PF 2% (XYLOCAINE) injection 20 mL    phenazopyridine (Pyridium) 200 MG tablet    promethazine (PHENERGAN) 25 MG tablet    linaclotide (LINZESS) 290 MCG capsule capsule    Left ureteral stone    Overview     Added automatically from request for surgery [id number]         Acute cystitis without hematuria - Primary    Overview     Added automatically from request for surgery [id number]         Current Assessment & Plan     - We will do bladder instillation with lidocaine 20 mL. I will send her some lidocaine samples to take home.  - Also given patient samples of Myrbetriq 50 mg for overactive bladder and urinating  every hour.  - We will send a urinalysis for culture pending results for definitive plan of care.  -Samples of Linzess and nausea medication provided.  - She will follow up in 1 month. She will return sooner if needed.           Relevant Medications    lidocaine PF 2% (XYLOCAINE) injection 20 mL    phenazopyridine (Pyridium) 200 MG tablet    promethazine (PHENERGAN) 25 MG tablet    Other Relevant Orders    POC Urinalysis Dipstick, Automated (Completed)    Urine Culture - Urine, Urine, Clean Catch    Acquired renal cyst of right kidney    Relevant Medications    promethazine (PHENERGAN) 25 MG tablet     Other Visit Diagnoses       Kidney stones        Relevant Medications    promethazine (PHENERGAN) 25 MG tablet    Bilateral nephrolithiasis        Relevant Medications    promethazine (PHENERGAN) 25 MG tablet    Dysuria-frequency syndrome        Relevant Medications    lidocaine PF 2% (XYLOCAINE) injection 20 mL    phenazopyridine (Pyridium) 200 MG tablet    promethazine (PHENERGAN) 25 MG tablet                                                ASSESSMENT  Recurrent urinary tract infections/OverActiveBladder:     Mrs. Elisabeth Hall is a 34-year-old very pleasant female who presents to clinic today for evaluation.  This is a yearly follow-up with prior complaints of bilateral renal stones-resolved, acute cystitis without hematuria, right renal cyst.     Patient was last evaluated in clinic and November 2022 with a left ureteral stone which she passed.  She has been lost to follow-up until recently.  Presents to clinic today with complaints of recurrent UTIs.  Patient is post multiple antibiotics in the last few months with levofloxacin, Bactrim, Macrobid, and most recently fosfomycin.  Did contact patient's PCPs office with no documented positive urine cultures on file.  Patient reports minimal improvement in her symptoms even post antibiotics.  She does have an overactive bladder/detrusor instability with bouts of  frequency urgency and dysuria.  We discussed chronic cystitis, we discussed avoiding her bladder irritants.     Again,  We discussed the types of organisms that are found in the urinary tract indicating that the vast majority are results of the patient's own gastrointestinal modesta.  We discussed how many of the antibiotics that are utilized can actually exacerbate these infections by creating resistant organisms and there is only a very few antibiotics that are concentrated in the urine and do not affect the rectal reservoir nor cause recurrent yeast vaginitis.      We discussed the risk factors for recurrent infections being intercourse in younger patients and atrophic changes in older patients.  We discussed the symptoms that are found including pain, pressure, burning, frequency, urgency suprapubic pain and painful intercourse.  I discussed upper tract symptoms including fevers, chills, and indicated the workup would be much more aggressive if the patient were to present with recurrent infections in the face of upper tract symptomatology such as fever. I discussed the history of vesicoureteral reflux in young patients and finally chronic renal scarring as a result of such.     IBS- Patient has significant irritable bowel syndrome, characterized by extreme amounts of constipation.  We spoke about the impact of this on bladder function.  We spoke about  its relationship to recurrent urinary tract infections.  We discussed the need for increasing p.o. fluid intake to at least 2 to 3 L of water daily, and discussed the physiology of colonic motility as well as use of MiraLAX as a bulk laxative versus the newer class of serotonin uptake blockers such as Linzess.  We stressed the need for a daily bowel movement and discussed the [place] stool scale at length. We also discussed a referral to the GI nurse practitioner.    Renal cyst-I discussed the Bosniak classification of renal cysts.  I discussed the strict criteria  involved with making a decision regarding intervention.  We discussed the fact that this is likely a Bosniak type I cyst which has sharp distinct borders and a thin wall and no internal echoes versus a Bosniak 2 with a thicker wall and faint striations.      We discussed the risks of malignancy in these situations is essentially 0 and requires no further intervention however we discussed the fact that a Bosniak 3 has about a 28% chance of malignancy and finally a Bosniak for probably is representative of a renal cell carcinoma variant. We discussed the fact that these are generally asymptomatic and do not require intervention and that the appropriate surgical management is a radiographic cyst puncture when causing pain or problems particularly on the left with an early satiety.    Again, we discussed detrusor instability which is an  irritative bladder symptomatology most likely related to factors such as intake of bladder irritants, postinfectious irritation, prolapse, with a very large differential diagnosis.  The mainstay of treatment has been tight cholinergics which basically caused the bladder to have decreased contractility.  We have discussed the side effects of these treatments including dry mouth, double vision, and increasing constipation.  She reports doing well  on oxybutynin for symptomatic relief.    Anticholinergic medication:  I talked about the various therapeutic options including anticholinergics, beta 3 agonists, and alpha blockade if there is a component of obstruction. I discussed the side effects of anti-cholinergic including dry mouth, double vision. HOWEVER, we are recommending the use of an anticholinergic. We discussed the class of drugs.  We discussed the older drug such as oxybutynin with his increased spectrum of side effects such as dry mouth, dry eyes constipation versus the newer medications with lower side effects but more difficult to obtain via insurance and much more expensive  finally the newest class of drugs which is Myrbetriq which has a very favorable side effect profile but unfortunately is prohibitively expensive and oftentimes requires precertification of which may be unsuccessful in many other cases    Interstitial cystitis-we discussed the diagnosis and management of this condition.  I indicated that it was a multifactorial condition with multifactorial symptomatology, Including frequency, urgency, the sensation of urinary tract infection in the absence of a positive culture, sexual symptomatology including significant dyspareunia. We discussed treatment options including the importance of making a clinical diagnosis and the cystoscopic findings including Hunner's ulcers etc.  We also discussed medical management including pharmacologic treatment such as amitriptyline, naturopathic treatments such as pumpkin oil and which more aggressive options of Botox injections as well as the relative risks and merits of this.  We also discussed the use of dietary manipulation including the over-the-counter product relief which basically decreases the acid in the urine and avoidance of ascitic-containing foods such as citrus is etc.  Sjogren's syndrome        PLAN  BLADDER INSTILLATION:I Prepped and Draped the patient  In a sterile fashion without any complication topical anesthetic with lidocaine was instilled into the urethra, with a #14 [place] catheter inserted to drain the bladder completely.  After that 20 mL of viscous lidocaine was instilled into the bladder.  Patient was encouraged to defer urination for at least 2 to 3 hours for maximum effect. Patient tolerated her bladder instillation in clinic today well without any complications.    Performed by: CHENG-AKWA, GRISELDA, APRN  Authorized by: CHENG-AKWA, GRISELDA, APRN    Consent: Verbal consent obtained.  Risks and benefits: risks, benefits and alternatives were discussed  Consent given by: patient  Patient understanding: patient  "states understanding of the procedure being performed  Patient consent: the patient's understanding of the procedure matches consent given  Procedure consent: procedure consent matches procedure scheduled  Relevant documents: relevant documents present and verified  Test results: test results available and properly labeled  Site marked: the operative site was marked  Imaging studies: imaging studies available  Patient identity confirmed: verbally with patient  Time out: Immediately prior to procedure a \"time out\" was called to verify the correct patient, procedure, equipment, support staff and site/side marked as required.  Preparation: Patient was prepped and draped in the usual sterile fashion.  Local anesthesia used: YES -LIDOCAINE GEL    We resent her urine for culture. I will call her with results if any bacteria growth.    Will Continue Macrobid 100 mg PO Daily -Suppressive Therapy ONLY IF POSITIVE CULTURE    She has been encouraged to increase her p.o. fluid intake to at least 1 to 2 L daily and avoid bladder irritants such as caffeine products, spicy foods, and citrusy foods.     I recommend concomitant probiotics with treatment with antibiotics to protect the rectal reservoir including over-the-counter yogurt preparations to russ oral pills containing the appropriate probiotics. Patient reports the diligent use of Probiotics.    She is to also continue Pyridium 200 mg 3 times daily as needed    Start Myrbetriq 50 mg p.o. nightly    Lidocaine Uro-Jet given to patient for severe pain/dysuria at home    Also sample Linzess given to patient for IBS with severe constipation    Will see her back in 1 month for Follow up in clinic and recheck her urinalysis at that time.    She may return sooner if need be,    Go to ER if any worsening symptoms    Patient is agreeable to plan of care.    Patient reports that she is not currently experiencing any symptoms of urinary incontinence.      BMI is >= 25 and <30. " (Overweight) The following options were offered after discussion;: weight loss educational material (shared in after visit summary), exercise counseling/recommendations, and nutrition counseling/recommendations      RADIOLOGY (CT AND/OR KUB):    CT Abdomen and Pelvis: No results found for this or any previous visit.     CT Stone Protocol: No results found for this or any previous visit.     KUB: Results for orders placed during the hospital encounter of 11/30/22    XR Abdomen KUB    Narrative  EXAMINATION: XR ABDOMEN KUB-    CLINICAL INDICATION: kidney stone; N20.0-Calculus of kidney      COMPARISON: 08/31/2022    FINDINGS:  One view of the abdomen    Calcifications are seen over both kidneys    Surgical clips in the right upper quadrant    Moderate stool burden    No acute bony abnormality.    Impression  Calcifications over both kidneys.    This report was finalized on 11/30/2022 12:55 PM by Dr. Saravanan Khalil MD.       LABS (3 MONTHS):    Office Visit on 02/08/2024   Component Date Value Ref Range Status    Color 02/08/2024 Yellow  Yellow, Straw, Dark Yellow, [place] Final    Clarity, UA 02/08/2024 Clear  Clear Final    Specific Gravity  02/08/2024 1.010  1.005 - 1.030 Final    pH, Urine 02/08/2024 6.0  5.0 - 8.0 Final    Leukocytes 02/08/2024 Negative  Negative Final    Nitrite, UA 02/08/2024 Negative  Negative Final    Protein, POC 02/08/2024 Negative  Negative mg/dL Final    Glucose, UA 02/08/2024 Negative  Negative mg/dL Final    Ketones, UA 02/08/2024 Negative  Negative Final    Urobilinogen, UA 02/08/2024 Normal  Normal, 0.2 E.U./dL Final    Bilirubin 02/08/2024 Negative  Negative Final    Blood, UA 02/08/2024 Negative  Negative Final    Lot Number 02/08/2024 [id number]   Final    Expiration Date 02/08/2024 102,024   Final          Smoking Cessation Counseling:  Current every day smoker. less than 3 minutes spent counseling. Has reduced tobacco use.  I advised patient to quit tobacco use and offered  support.  I provided patient with tobacco cessation educational material printed in the patient's After Visit Summary.       Follow Up   Return in about 1 month (around 3/8/2024) for Next scheduled follow up, RECURRENT UTI/DYSURIA/DETRUSSOR INSTABILITY/OAB/BLADDER PAIN/IBS -EVALMEDS.    Patient was given instructions and counseling regarding her condition or for health maintenance advice. Please see specific information pulled into the AVS if appropriate.          This document has been electronically signed by Griselda Cheng-Akwa, APRN   February 8, 2024 16:26 EST      Dictated Utilizing Dragon Dictation: Part of this note may be an electronic transcription/translation of spoken language to printed text using the Dragon Dictation System.     Transcribed from ambient dictation for Griselda Cheng-Akwa, APRN by Yue Hassan.  02/08/24   11:02 EST    Patient or patient representative verbalized consent to the visit recording.  I have personally performed the services described in this document as transcribed by the above individual, and it is both accurate and complete.

## 2024-02-08 NOTE — ASSESSMENT & PLAN NOTE
- We will do bladder instillation with lidocaine 20 mL. I will send her some lidocaine samples to take home.  - Also given patient samples of Myrbetriq 50 mg for overactive bladder and urinating every hour.  - We will send a urinalysis for culture pending results for definitive plan of care.  -Samples of Linzess and nausea medication provided.  - She will follow up in 1 month. She will return sooner if needed.

## 2024-02-09 LAB — BACTERIA SPEC AEROBE CULT: NO GROWTH

## 2024-02-12 ENCOUNTER — TELEPHONE (OUTPATIENT)
Dept: UROLOGY | Facility: CLINIC | Age: 34
End: 2024-02-12
Payer: MEDICAID

## 2024-03-22 ENCOUNTER — OFFICE VISIT (OUTPATIENT)
Dept: UROLOGY | Facility: CLINIC | Age: 34
End: 2024-03-22
Payer: MEDICAID

## 2024-03-22 ENCOUNTER — HOSPITAL ENCOUNTER (OUTPATIENT)
Dept: GENERAL RADIOLOGY | Facility: HOSPITAL | Age: 34
Discharge: HOME | End: 2024-03-22
Payer: MEDICAID

## 2024-03-22 VITALS
HEIGHT: 60 IN | DIASTOLIC BLOOD PRESSURE: 85 MMHG | SYSTOLIC BLOOD PRESSURE: 122 MMHG | HEART RATE: 102 BPM | BODY MASS INDEX: 33.61 KG/M2 | WEIGHT: 171.2 LBS

## 2024-03-22 DIAGNOSIS — R10.9 BILATERAL FLANK PAIN: ICD-10-CM

## 2024-03-22 DIAGNOSIS — N28.1 ACQUIRED RENAL CYST OF RIGHT KIDNEY: ICD-10-CM

## 2024-03-22 DIAGNOSIS — N20.0 BILATERAL NEPHROLITHIASIS: ICD-10-CM

## 2024-03-22 DIAGNOSIS — N30.00 ACUTE CYSTITIS WITHOUT HEMATURIA: Primary | ICD-10-CM

## 2024-03-22 LAB
BILIRUB BLD-MCNC: ABNORMAL MG/DL
CLARITY, POC: CLEAR
COLOR UR: YELLOW
EXPIRATION DATE: ABNORMAL
GLUCOSE UR STRIP-MCNC: NEGATIVE MG/DL
KETONES UR QL: NEGATIVE
LEUKOCYTE EST, POC: NEGATIVE
Lab: ABNORMAL
NITRITE UR-MCNC: NEGATIVE MG/ML
PH UR: 8.5 [PH] (ref 5–8)
PROT UR STRIP-MCNC: NEGATIVE MG/DL
RBC # UR STRIP: NEGATIVE /UL
SP GR UR: 1.01 (ref 1–1.03)
UROBILINOGEN UR QL: NORMAL

## 2024-03-22 PROCEDURE — 87086 URINE CULTURE/COLONY COUNT: CPT | Performed by: NURSE PRACTITIONER

## 2024-03-22 PROCEDURE — 74018 RADEX ABDOMEN 1 VIEW: CPT

## 2024-03-22 NOTE — PROGRESS NOTES
Chief Complaint  RECURRENT UTI/DYSURIA/DETRUSSOR INSTABILITY/OAB/BLADDER BRIEN (1 month follow up)    lOiva Hall presents to [facility] for RECURRENT UTI/OAB/DI  History of Present Illness    The patient is a pleasant 34-year-old female who returns to clinic today for evaluation.This is a 1-month follow-up with prior complaints of bilateral kidney stones, acute cystitis without hematuria, chronic bladder pain, and overactive bladder/Detrusor instability complicated by bouts of pelvic pressure and suprapubic discomfort, urine frequency, urgency, dysuria, and constant burning with urination.     Patient is also follow-up for right renal cyst. Last evaluated in clinic on 02/08/2024. She was in apparent discomfort. Nevertheless, her urinalysis were completely negative for any bacterial growth. She did report recurrent UTIs that have been ongoing for the last few months prior to being evaluated in clinic. I did contact patient's PCP for urine cultures completed on 09/20/2023, 11/20/2023, and 12/20/2023 with no documented positive urine cultures.     Patient at that point was post therapy on Levaquin and then Fosfomycin and then clindamycin. We had discussed conservative therapy secondary to her imaging showing the source of her discomfort not been noted. She, however, her KUB showed  moderate to large volume stools consistent with severe constipation. For overactive bladder, patient was given samples of Myrbetriq for trial. She was also given Linzess for her bowels and she had a lidocaine instillation for cystitis.     She returns to clinic today for reevaluation in no apparent discomfort. Urinalysis again is completely negative for leukocyte esterase. It is negative for nitrite. It is negative for gross/microscopic hematuria. Her postvoid residual is 0 mL.    She has been better. She ran out of the Linzess samples the past few days, and her constipation  started back. Sweet tea triggers her cystitis. She denies eating spicy foods. She has only had 1 cup of coffee and a little bit of water today. She has used lidocaine only with water.    In the past couple of weeks, her legs have been swelling. The swelling is usually a few hours after being awake and moving around. She denies any weight problems. She eats a lot of salt. She does not want to take a diuretic. Her blood pressure has been running 120 to 130 over 78 to 85. She had blood work done in 11/2023. She has been on metoprolol for about 6 years.    She had a CT done in 2023. The last time she had a scan, she was told she had a 5 mm kidney stone and 1 in the right upper pole. She has flank pain and abdominal pain.      IMPRESSION CT 06/24/22:   Small bilateral nonobstructing renal calculi. No urinary tract obstruction. Ureters difficult to follow. Fluid within large and small bowel as described can be seen with diarrheal illness/enteritis, respectively.   Stable 1.6 cm hypodense indeterminate right adrenal nodule. Initial further evaluation with unenhanced CT of the abdomen could be considered   Active Ambulatory Problems     Diagnosis Date Noted    Essential hypertension 09/28/2016    RLS (restless legs syndrome) 09/28/2016    IBS (irritable bowel syndrome) 09/28/2016    Iron deficiency anemia 01/17/2017    Fibromyalgia 08/16/2018    COPD mixed type 08/16/2018    Smoker 10/28/2019    Gastroesophageal reflux disease 08/17/2020    Menopausal symptoms 12/03/2020    Healthcare maintenance 04/07/2021    Vitamin D deficiency 04/07/2021    Bilateral nephrolithiasis 01/31/2022    Adrenal adenoma 01/31/2022    Solitary thyroid nodule 02/07/2022    Left ureteral stone 11/30/2022    Bilateral flank pain 11/30/2022    Acute cystitis without hematuria 11/30/2022    Acquired renal cyst of right kidney 02/08/2024     Resolved Ambulatory Problems     Diagnosis Date Noted    Muscle spasm 09/28/2016    Vertigo, peripheral  "09/28/2016    Chronic midline low back pain with sciatica 09/28/2016    Esophagitis, reflux 09/28/2016    Constipation - functional 09/28/2016    Abscess of groin, left 01/17/2017    Fatigue 12/21/2017    Elevated fasting glucose 12/21/2017    Elevated fasting lipid profile 12/21/2017    Menorrhagia with regular cycle 02/05/2018    Sebaceous cyst of ear 02/05/2018    Stomach pain 02/21/2020    Generalized abdominal pain 03/17/2020    Nausea 03/17/2020    Hypercholesterolemia 04/02/2020    Obesity 08/06/2012    Other chronic disease of tonsils and adenoids 04/02/2020    LUQ abdominal pain 08/17/2020    Irritable bowel syndrome with constipation 08/17/2020    Endometriosis 08/26/2020    Pain in joint involving multiple sites 03/24/2021    Overweight (BMI 25.0-29.9) 03/24/2021     Past Medical History:   Diagnosis Date    Anemia 2017    Arthritis 2009    Constipation     COPD (chronic obstructive pulmonary disease)     Fatty liver 02/10/2020    GERD (gastroesophageal reflux disease) 2009    Hypertension     Hypothyroidism 06/2021    Irritable bowel     Kidney stone     Lumbago     Memory loss     Restless legs     Thyroid nodule 08/2021    Urinary tract infection 01/22      Objective   Vital Signs:   /85   Pulse 102   Ht 152.4 cm (60\")   Wt 77.7 kg (171 lb 3.2 oz)   BMI 33.44 kg/m²       ROS:   Review of Systems   Constitutional:  Positive for activity change, appetite change and unexpected weight gain. Negative for chills, diaphoresis, fatigue, fever and unexpected weight loss.   HENT: Negative.  Negative for congestion, ear discharge, ear pain, nosebleeds, rhinorrhea, sinus pressure and sore throat.    Eyes: Negative.  Negative for blurred vision, double vision, photophobia, pain, redness and visual disturbance.   Respiratory:  Negative for apnea, cough, chest tightness, shortness of breath, wheezing and stridor.    Cardiovascular:  Negative for chest pain, palpitations and leg swelling.   Gastrointestinal: "  Positive for abdominal distention, abdominal pain and constipation. Negative for diarrhea, nausea and vomiting.   Endocrine: Negative for polydipsia, polyphagia and polyuria.   Genitourinary:  Positive for decreased urine volume, dysuria, flank pain, frequency, pelvic pain, pelvic pressure, urgency and urinary incontinence. Negative for difficulty urinating, dyspareunia, genital sores, hematuria and vaginal discharge.   Musculoskeletal:  Positive for back pain. Negative for arthralgias and joint swelling.   Skin:  Positive for dry skin and pallor. Negative for rash and wound.   Neurological:  Negative for dizziness, tremors, syncope, weakness, light-headedness, headache, memory problem and confusion.   Hematological: Negative.    Psychiatric/Behavioral:  Positive for sleep disturbance and stress. Negative for behavioral problems and decreased concentration.         Physical Exam  Constitutional:       General: She is not in acute distress.     Appearance: She is well-developed. She is obese. She is ill-appearing.   HENT:      Head: Normocephalic and atraumatic.   Eyes:      Pupils: Pupils are equal, round, and reactive to light.   Neck:      Thyroid: No thyromegaly.      Trachea: No tracheal deviation.   Cardiovascular:      Rate and Rhythm: Normal rate and regular rhythm.      Heart sounds: No murmur heard.  Pulmonary:      Effort: Pulmonary effort is normal. No respiratory distress.      Breath sounds: Normal breath sounds. No stridor. No wheezing.   Abdominal:      General: Bowel sounds are normal. There is distension.      Palpations: Abdomen is soft.      Tenderness: There is abdominal tenderness.   Genitourinary:     Labia:         Right: No tenderness.         Left: No tenderness.       Vagina: Normal. No vaginal discharge.   Musculoskeletal:         General: Tenderness present. No deformity. Normal range of motion.      Cervical back: Normal range of motion.   Skin:     General: Skin is warm and dry.       Capillary Refill: Capillary refill takes less than 2 seconds.      Coloration: Skin is not pale.      Findings: No erythema or rash.   Neurological:      Mental Status: She is alert and oriented to person, place, and time.      Cranial Nerves: No cranial nerve deficit.      Sensory: No sensory deficit.      Motor: Weakness present.      Coordination: Coordination normal.   Psychiatric:         Behavior: Behavior normal.         Thought Content: Thought content normal.         Judgment: Judgment normal.        Result Review :     UA          2/8/2024    09:48 3/22/2024    13:22   Urinalysis   Ketones, UA Negative  Negative    Leukocytes, UA Negative  Negative      Urine Culture          2/8/2024    09:51 3/22/2024    13:22   Urine Culture   Urine Culture No growth  No growth             Assessment and Plan    Problem List Items Addressed This Visit          Gastrointestinal Abdominal     Bilateral flank pain    Overview     Added automatically from request for surgery [id number]         Relevant Orders    XR abdomen kub (Completed)       Genitourinary and Reproductive     Bilateral nephrolithiasis    Relevant Orders    XR abdomen kub (Completed)    Acute cystitis without hematuria - Primary    Overview     Added automatically from request for surgery [id number]         Relevant Orders    POC Urinalysis Dipstick, Automated (Completed)    XR abdomen kub (Completed)    Acquired renal cyst of right kidney                                           ASSESSMENT             Recurrent urinary tract infections/OverActiveBladder:      Mrs. Elisabeth Hall is a 34-year-old very pleasant female who presents to clinic today for evaluation.  This is a ONE MONTH follow-up with prior complaints of bilateral renal stones-resolved, acute cystitis without hematuria, right renal cyst.      Patient was last evaluated in clinic ON 02/08/24 with a left ureteral stone which she passed.  She has been lost to follow-up until recently.  Presents to  clinic today with complaints of recurrent UTIs.  Patient is post multiple antibiotics in the last few months with levofloxacin, Bactrim, Macrobid, and most recently fosfomycin.  Did contact patient's PCPs office with no documented positive urine cultures on file.  Patient reports minimal improvement in her symptoms even post antibiotics.  She does have an overactive bladder/detrusor instability with bouts of frequency urgency and dysuria.  We discussed chronic cystitis, we discussed avoiding her bladder irritants.      Again,  We discussed the types of organisms that are found in the urinary tract indicating that the vast majority are results of the patient's own gastrointestinal modesta.  We discussed how many of the antibiotics that are utilized can actually exacerbate these infections by creating resistant organisms and there is only a very few antibiotics that are concentrated in the urine and do not affect the rectal reservoir nor cause recurrent yeast vaginitis.       We discussed the risk factors for recurrent infections being intercourse in younger patients and atrophic changes in older patients.  We discussed the symptoms that are found including pain, pressure, burning, frequency, urgency suprapubic pain and painful intercourse.  I discussed upper tract symptoms including fevers, chills, and indicated the workup would be much more aggressive if the patient were to present with recurrent infections in the face of upper tract symptomatology such as fever. I discussed the history of vesicoureteral reflux in young patients and finally chronic renal scarring as a result of such.      IBS- Patient has significant irritable bowel syndrome, characterized by extreme amounts of constipation.  We spoke about the impact of this on bladder function.  We spoke about  its relationship to recurrent urinary tract infections.  We discussed the need for increasing p.o. fluid intake to at least 2 to 3 L of water daily, and  discussed the physiology of colonic motility as well as use of MiraLAX as a bulk laxative versus the newer class of serotonin uptake blockers such as Linzess.  We stressed the need for a daily bowel movement and discussed the Early stool scale at length. We also discussed a referral to the GI nurse practitioner.     Renal cyst-I discussed the Bosniak classification of renal cysts.  I discussed the strict criteria involved with making a decision regarding intervention.  We discussed the fact that this is likely a Bosniak type I cyst which has sharp distinct borders and a thin wall and no internal echoes versus a Bosniak 2 with a thicker wall and faint striations.       We discussed the risks of malignancy in these situations is essentially 0 and requires no further intervention however we discussed the fact that a Bosniak 3 has about a 28% chance of malignancy and finally a Bosniak for probably is representative of a renal cell carcinoma variant. We discussed the fact that these are generally asymptomatic and do not require intervention and that the appropriate surgical management is a radiographic cyst puncture when causing pain or problems particularly on the left with an early satiety.     Again, we discussed detrusor instability which is an  irritative bladder symptomatology most likely related to factors such as intake of bladder irritants, postinfectious irritation, prolapse, with a very large differential diagnosis.  The mainstay of treatment has been tight cholinergics which basically caused the bladder to have decreased contractility.  We have discussed the side effects of these treatments including dry mouth, double vision, and increasing constipation.  She reports doing well  on oxybutynin for symptomatic relief.     Anticholinergic medication:  I talked about the various therapeutic options including anticholinergics, beta 3 agonists, and alpha blockade if there is a component of obstruction. I  discussed the side effects of anti-cholinergic including dry mouth, double vision. HOWEVER, we are recommending the use of an anticholinergic. We discussed the class of drugs.  We discussed the older drug such as oxybutynin with his increased spectrum of side effects such as dry mouth, dry eyes constipation versus the newer medications with lower side effects but more difficult to obtain via insurance and much more expensive finally the newest class of drugs which is Myrbetriq which has a very favorable side effect profile but unfortunately is prohibitively expensive and oftentimes requires precertification of which may be unsuccessful in many other cases     Interstitial cystitis-we discussed the diagnosis and management of this condition.  I indicated that it was a multifactorial condition with multifactorial symptomatology, Including frequency, urgency, the sensation of urinary tract infection in the absence of a positive culture, sexual symptomatology including significant dyspareunia. We discussed treatment options including the importance of making a clinical diagnosis and the cystoscopic findings including Hunner's ulcers etc.  We also discussed medical management including pharmacologic treatment such as amitriptyline, naturopathic treatments such as pumpkin oil and which more aggressive options of Botox injections as well as the relative risks and merits of this.  We also discussed the use of dietary manipulation including the over-the-counter product relief which basically decreases the acid in the urine and avoidance of ascitic-containing foods such as citrus is etc.  Sjogren's syndrome                                                     PLAN     We resent her urine for culture. I will call her with results if any bacteria growth.     Will Continue Macrobid 100 mg PO Daily -Suppressive Therapy ONLY IF POSITIVE CULTURE     She has been encouraged to increase her p.o. fluid intake to at least 1 to 2 L daily  and avoid bladder irritants such as caffeine products, spicy foods, and citrusy foods.      I recommend concomitant probiotics with treatment with antibiotics to protect the rectal reservoir including over-the-counter yogurt preparations to russ oral pills containing the appropriate probiotics. Patient reports the diligent use of Probiotics.     She is to also continue Pyridium 200 mg 3 times daily as needed     Start Myrbetriq 50 mg p.o. nightly     Lidocaine Uro-Jet given to patient for severe pain/dysuria at home     Also sample Linzess given to patient for IBS with severe constipation     Will see her back in 3 month for Follow up in clinic and recheck her urinalysis at that time.     She may return sooner if need be,     Go to ER if any worsening symptoms     Patient is agreeable to plan of care.       Patient reports that she is not currently experiencing any symptoms of urinary incontinence.    RADIOLOGY (CT AND/OR KUB):    CT Abdomen and Pelvis: No results found for this or any previous visit.     CT Stone Protocol: No results found for this or any previous visit.     KUB: Results for orders placed during the hospital encounter of 11/30/22    XR Abdomen KUB    Narrative  EXAMINATION: XR ABDOMEN KUB-    CLINICAL INDICATION: kidney stone; N20.0-Calculus of kidney      COMPARISON: 08/31/2022    FINDINGS:  One view of the abdomen    Calcifications are seen over both kidneys    Surgical clips in the right upper quadrant    Moderate stool burden    No acute bony abnormality.    Impression  Calcifications over both kidneys.    This report was finalized on 11/30/2022 12:55 PM by Dr. Saravanan Khalil MD.       LABS (3 MONTHS):    Hospital Outpatient Visit on 03/22/2024   Component Date Value Ref Range Status    Urine Culture 03/22/2024 No growth   Final   Office Visit on 03/22/2024   Component Date Value Ref Range Status    Color 03/22/2024 Yellow  Yellow, Straw, Dark Yellow, Francisca Final    Clarity, UA 03/22/2024  Clear  Clear Final    Specific Gravity  03/22/2024 1.010  1.005 - 1.030 Final    pH, Urine 03/22/2024 8.5 (A)  5.0 - 8.0 Final    Leukocytes 03/22/2024 Negative  Negative Final    Nitrite, UA 03/22/2024 Negative  Negative Final    Protein, POC 03/22/2024 Negative  Negative mg/dL Final    Glucose, UA 03/22/2024 Negative  Negative mg/dL Final    Ketones, UA 03/22/2024 Negative  Negative Final    Urobilinogen, UA 03/22/2024 Normal  Normal, 0.2 E.U./dL Final    Bilirubin 03/22/2024 Small (1+) (A)  Negative Final    Blood, UA 03/22/2024 Negative  Negative Final    Lot Number 03/22/2024 [id number]   Final    Expiration Date 03/22/2024 10/25/2024   Final   Office Visit on 02/08/2024   Component Date Value Ref Range Status    Color 02/08/2024 Yellow  Yellow, Straw, Dark Yellow, Francisca Final    Clarity, UA 02/08/2024 Clear  Clear Final    Specific Gravity  02/08/2024 1.010  1.005 - 1.030 Final    pH, Urine 02/08/2024 6.0  5.0 - 8.0 Final    Leukocytes 02/08/2024 Negative  Negative Final    Nitrite, UA 02/08/2024 Negative  Negative Final    Protein, POC 02/08/2024 Negative  Negative mg/dL Final    Glucose, UA 02/08/2024 Negative  Negative mg/dL Final    Ketones, UA 02/08/2024 Negative  Negative Final    Urobilinogen, UA 02/08/2024 Normal  Normal, 0.2 E.U./dL Final    Bilirubin 02/08/2024 Negative  Negative Final    Blood, UA 02/08/2024 Negative  Negative Final    Lot Number 02/08/2024 [id number]   Final    Expiration Date 02/08/2024 102,024   Final    Urine Culture 02/08/2024 No growth   Final      1. Bilateral kidney stones.  Overall, she is doing significantly better. I will complete a KUB to determine location and size of her stone secondary to her intermittent bilateral flank pain. Plan is to get her follow-up in 3 months with a renal ultrasound to evaluate her renal cyst and she will return sooner if need be.    Follow-up  The patient will follow up in 3 months with a renal ultrasound.      Smoking Cessation  Counseling:  Current every day smoker. less than 3 minutes spent counseling. Will try to cut down.  I advised patient to quit tobacco use and offered support.  I provided patient with tobacco cessation educational material printed in the patient's After Visit Summary.     Follow Up   Return in about 3 months (around 6/24/2024) for Next scheduled follow up, RECURRENT UTI/DYSURIA/DETRUSSOR INSTABILITY.    Patient was given instructions and counseling regarding her condition or for health maintenance advice. Please see specific information pulled into the AVS if appropriate.          This document has been electronically signed by Griselda Cheng-Akwa, APRN   March 24, 2024 22:40 EDT    Transcribed from ambient dictation for Griselda Cheng-Akwa, APRN by Kelly Garcia.  03/22/24   13:57 EDT    Patient or patient representative verbalized consent to the visit recording.  I have personally performed the services described in this document as transcribed by the above individual, and it is both accurate and complete.        Dictated Utilizing Dragon Dictation: Part of this note may be an electronic transcription/translation of spoken language to printed text using the Dragon Dictation System.

## 2024-03-24 LAB — BACTERIA SPEC AEROBE CULT: NO GROWTH

## 2024-03-25 ENCOUNTER — TELEPHONE (OUTPATIENT)
Dept: UROLOGY | Facility: CLINIC | Age: 34
End: 2024-03-25
Payer: MEDICAID

## 2024-08-08 ENCOUNTER — TRANSCRIBE ORDERS (OUTPATIENT)
Dept: ADMINISTRATIVE | Facility: HOSPITAL | Age: 34
End: 2024-08-08
Payer: MEDICAID

## 2024-08-08 DIAGNOSIS — E27.8 ADRENAL MASS: Primary | ICD-10-CM

## 2024-08-22 ENCOUNTER — HOSPITAL ENCOUNTER (OUTPATIENT)
Dept: CT IMAGING | Facility: HOSPITAL | Age: 34
Discharge: HOME | End: 2024-08-22
Admitting: FAMILY MEDICINE
Payer: MEDICAID

## 2024-08-22 DIAGNOSIS — E27.8 ADRENAL MASS: ICD-10-CM

## 2024-08-22 PROCEDURE — 25510000001 IOPAMIDOL 61 % SOLUTION: Performed by: FAMILY MEDICINE

## 2024-08-22 PROCEDURE — 74178 CT ABD&PLV WO CNTR FLWD CNTR: CPT

## 2024-08-22 RX ADMIN — IOPAMIDOL 100 ML: 612 INJECTION, SOLUTION INTRAVENOUS at 09:33

## (undated) DEVICE — SUCTION CANISTER, 1500CC, RIGID: Brand: DEROYAL

## (undated) DEVICE — FRCP BX RADJAW4 NDL 2.8 240CM LG OG BX40

## (undated) DEVICE — THE BITE BLOCK MAXI, LATEX FREE STRAP IS USED TO PROTECT THE ENDOSCOPE INSERTION TUBE FROM BEING BITTEN BY THE PATIENT.

## (undated) DEVICE — GOWN,REINF,POLY,ECL,PP SLV,XL: Brand: MEDLINE

## (undated) DEVICE — SYR LUERLOK 30CC

## (undated) DEVICE — ENDOGATOR AUXILIARY WATER JET CONNECTOR: Brand: ENDOGATOR

## (undated) DEVICE — Device

## (undated) DEVICE — CONN Y IRR DISP 1P/U